# Patient Record
Sex: MALE | Race: WHITE | NOT HISPANIC OR LATINO | Employment: FULL TIME | URBAN - METROPOLITAN AREA
[De-identification: names, ages, dates, MRNs, and addresses within clinical notes are randomized per-mention and may not be internally consistent; named-entity substitution may affect disease eponyms.]

---

## 2018-11-09 ENCOUNTER — OFFICE VISIT (OUTPATIENT)
Dept: ORTHOPEDIC SURGERY | Facility: CLINIC | Age: 53
End: 2018-11-09

## 2018-11-09 VITALS — HEIGHT: 72 IN | BODY MASS INDEX: 39.82 KG/M2 | TEMPERATURE: 98.4 F | WEIGHT: 294 LBS

## 2018-11-09 DIAGNOSIS — S89.91XA INJURY OF RIGHT KNEE, INITIAL ENCOUNTER: Primary | ICD-10-CM

## 2018-11-09 PROCEDURE — 99203 OFFICE O/P NEW LOW 30 MIN: CPT | Performed by: ORTHOPAEDIC SURGERY

## 2018-11-09 PROCEDURE — 73562 X-RAY EXAM OF KNEE 3: CPT | Performed by: ORTHOPAEDIC SURGERY

## 2018-11-09 PROCEDURE — 20610 DRAIN/INJ JOINT/BURSA W/O US: CPT | Performed by: ORTHOPAEDIC SURGERY

## 2018-11-09 RX ORDER — IBUPROFEN 800 MG/1
800 TABLET ORAL EVERY 6 HOURS PRN
COMMUNITY
Start: 2018-10-25 | End: 2019-01-14

## 2018-11-09 RX ORDER — OMEPRAZOLE 40 MG/1
40 CAPSULE, DELAYED RELEASE ORAL 2 TIMES DAILY
COMMUNITY
Start: 2018-06-11 | End: 2023-01-12 | Stop reason: SDUPTHER

## 2018-11-09 RX ORDER — LORATADINE 10 MG/1
10 TABLET ORAL DAILY PRN
COMMUNITY

## 2018-11-09 RX ORDER — LISINOPRIL AND HYDROCHLOROTHIAZIDE 12.5; 1 MG/1; MG/1
1 TABLET ORAL EVERY MORNING
COMMUNITY
Start: 2018-06-11 | End: 2021-04-26

## 2018-11-09 RX ADMIN — LIDOCAINE HYDROCHLORIDE 2 ML: 20 INJECTION, SOLUTION EPIDURAL; INFILTRATION; INTRACAUDAL; PERINEURAL at 16:35

## 2018-11-09 RX ADMIN — METHYLPREDNISOLONE ACETATE 80 MG: 80 INJECTION, SUSPENSION INTRA-ARTICULAR; INTRALESIONAL; INTRAMUSCULAR; SOFT TISSUE at 16:35

## 2018-11-09 NOTE — PROGRESS NOTES
"Patient: Maurizio Mcnair    YOB: 1965    Medical Record Number: [id number]    Chief Complaints:  Right knee pain    History of Present Illness:     53 y.o. male patient who presents with a recent history of pain and dysfunction affecting the right knee.  Reports right knee injury occurred at work approximately 2 weeks ago.  He is a  for [employer].  At the time of the injury, he was using a \"palet juan c \" to unload a truck trailer.  Reports there was some wood debris in the trailer, which required him to reposition the palet juan c.  As he stepped to the right, he felt a \"pop\".  Initially the pain was moderate and he was unable to complete his shift due to increased pain. The next day he was evaluated at [facility].  He describes the pain as moderate, aching, and intermittent.  He has difficulty navigating stairs and climbing into a truck trailer.  He has tried ibuprofen 800 mg, ice, brace, and elevation all of which provided mild temporary relief.  He has associated swelling and popping.  He denies catching, locking, or instability.  He has a history of a symptomatic left knee meniscal tear, which was treated conservatively.        Allergies   Allergen Reactions   • Kiwi Extract Itching and Swelling   • Latex Itching     Itch       Medications:   Home Medications:    Current Outpatient Prescriptions:   •   MG tablet, , Disp: , Rfl:   •  lisinopril-hydrochlorothiazide (PRINZIDE,ZESTORETIC) 10-12.5 MG per tablet, Take 1 tablet by mouth., Disp: , Rfl:   •  omeprazole (priLOSEC) 40 MG capsule, Take 40 mg by mouth., Disp: , Rfl:   •  loratadine (CLARITIN) 10 MG tablet, Take 10 mg by mouth., Disp: , Rfl:     Past Medical History:   Diagnosis Date   • GERD (gastroesophageal reflux disease)    • Hypertension        Past Surgical History:   Procedure Laterality Date   • CATARACT EXTRACTION  04/2017   • HERNIA REPAIR  06/1988   • NASAL POLYP EXCISION  10/2016       Social History     Occupational " "History   • Not on file.     Social History Main Topics   • Smoking status: Never Smoker   • Smokeless tobacco: Never Used   • Alcohol use No   • Drug use: No   • Sexual activity: Not on file      Social History     Social History Narrative   • No narrative on file       Family History   Problem Relation Age of Onset   • Emphysema Father        Review of Systems:      Constitutional: Denies fever, shaking or chills   Eyes: Denies change in visual acuity   HEENT: Denies nasal congestion or sore throat   Respiratory: Denies cough or shortness of breath   Cardiovascular: Denies chest pain or edema  Endocrine: Denies tremors, palpitations, intolerance of heat or cold, polyuria, polydipsia.  GI: Denies abdominal pain, nausea, vomiting, bloody stools or diarrhea  : Denies frequency, urgency, incontinence, retention, or nocturia.  Musculoskeletal: Denies numbness tingling or loss of motor function except as above  Integument: Denies rash, lesion or ulceration   Neurologic: Denies headache or focal weakness, deficits  Heme: Denies epistaxis, spontaneous or excessive bleeding, epistaxis, hematuria, melena, fatigue, enlarged or tender lymph nodes.      All other pertinent positives and negatives as noted above in HPI.    Physical Exam: 53 y.o. male  Vitals:    11/09/18 1516   Temp: 98.4 °F (36.9 °C)   TempSrc: Temporal Artery    Weight: 133 kg (294 lb)   Height: 182.9 cm (72\")       General:  Patient is awake and alert.  Appears in no acute distress or discomfort.    Psych:  Affect and demeanor are appropriate.    Eyes:  Conjunctiva and sclera appear grossly normal.  Eyes track well and EOM seem to be intact.    Ears:  No gross abnormalities.  Hearing adequate for the exam.    Cardiovascular:  Regular rate and rhythm.    Lungs:  Good chest expansion.  Breathing unlabored.    Spine:  Back appears grossly normal.  No palpable masses or adenopathy.  Good motion.  Straight leg raise and crossed straight leg raise manuever are " both negative for lower leg and/or knee pain.    Right Lower Extremity:  Skin is benign with the exception of a subtle rash which he says is from the neoprene sleeve that he has been wearing for comfort.  No obvious gross abnormalities about the knee.  No palpable masses or adenopathy.  Moderate to severe tenderness noted over medial joint line.  Motion is to 120° of flexion, full extension.  Markedly positive medial Julianna's for pain.  No instability.  Strength is well preserved including hip flexion, knee extension, ankle and toe plantarflexion, ankle inversion and eversion.  Good sensory function throughout the leg and foot.  Palpable pulses.  Gait is mildly antalgic.         Radiology: Bilateral standing AP views, bilateral merchants views, and a lateral view of the right knee are ordered by myself and reviewed to evaluate the patient's complaint.  No comparison films are immediately available.  The x-rays show no obvious acute abnormalities, lesions, masses, significant degenerative changes, or other concerning findings.    Assessment/Plan:  Right knee pain, possible meniscal tear    We discussed treatment options in detail including the risks, benefits, and alternatives of conservative treatment versus further workup and potential surgical options.  Regarding conservative treatment, we discussed appropriate activity modifications, anti-inflammatories, injections, and physical therapy.  The patient has stated that he would like to start with a conservative approach.  I have recommended a corticosteroid injection. The risks, benefits, and alternatives to an injection were thoroughly discussed and the patient consented to proceed.  I am going to refer him to physical therapy.  I will see him back in 2 weeks to reevaluate.  I will keep him on work restrictions for now.    Rodney Vega MD    11/09/2018    Large Joint Arthrocentesis  Date/Time: 11/9/2018 4:35 PM  Consent given by: patient  Site marked: site  marked  Timeout: Immediately prior to procedure a time out was called to verify the correct patient, procedure, equipment, support staff and site/side marked as required   Procedure Details  Location: knee - R knee  Preparation: Patient was prepped and draped in the usual sterile fashion  Needle gauge: 21 G.  Approach: anterolateral  Medications administered: 2 mL lidocaine PF 2% 2 %; 80 mg methylPREDNISolone acetate 80 MG/ML  Patient tolerance: patient tolerated the procedure well with no immediate complications        CC to Stevie Herndon MD

## 2018-11-11 RX ORDER — METHYLPREDNISOLONE ACETATE 80 MG/ML
80 INJECTION, SUSPENSION INTRA-ARTICULAR; INTRALESIONAL; INTRAMUSCULAR; SOFT TISSUE
Status: COMPLETED | OUTPATIENT
Start: 2018-11-09 | End: 2018-11-09

## 2018-11-11 RX ORDER — LIDOCAINE HYDROCHLORIDE 20 MG/ML
2 INJECTION, SOLUTION EPIDURAL; INFILTRATION; INTRACAUDAL; PERINEURAL
Status: COMPLETED | OUTPATIENT
Start: 2018-11-09 | End: 2018-11-09

## 2018-11-26 ENCOUNTER — OFFICE VISIT (OUTPATIENT)
Dept: ORTHOPEDIC SURGERY | Facility: CLINIC | Age: 53
End: 2018-11-26

## 2018-11-26 VITALS — HEIGHT: 72 IN | WEIGHT: 294 LBS | BODY MASS INDEX: 39.82 KG/M2

## 2018-11-26 DIAGNOSIS — M25.561 RIGHT KNEE PAIN, UNSPECIFIED CHRONICITY: Primary | ICD-10-CM

## 2018-11-26 PROCEDURE — 99213 OFFICE O/P EST LOW 20 MIN: CPT | Performed by: ORTHOPAEDIC SURGERY

## 2018-11-26 NOTE — PROGRESS NOTES
Patient:Maurizio Mcnair    YOB: 1965    Medical Record Number:[id number]    Chief Complaints:  Right knee pain    History of Present Illness:     53 y.o. male patient who presents for follow-up of his right knee.  The last injection helped for 2 days by his estimate.  He continues to have moderate intermittent sharp pains.  The symptoms are worse with bending and squatting.  He tried some limited physical therapy which did not help.  Pain is anteromedial and posterior.  Denies any catching or locking.  He does get some clicking and popping.    Allergies:  Allergies   Allergen Reactions   • Kiwi Extract Itching and Swelling   • Latex Itching     Itch       Home Medications:    Current Outpatient Medications:   •   MG tablet, , Disp: , Rfl:   •  lisinopril-hydrochlorothiazide (PRINZIDE,ZESTORETIC) 10-12.5 MG per tablet, Take 1 tablet by mouth., Disp: , Rfl:   •  loratadine (CLARITIN) 10 MG tablet, Take 10 mg by mouth., Disp: , Rfl:   •  omeprazole (priLOSEC) 40 MG capsule, Take 40 mg by mouth., Disp: , Rfl:     Past Medical History:   Diagnosis Date   • GERD (gastroesophageal reflux disease)    • Hypertension        Past Surgical History:   Procedure Laterality Date   • CATARACT EXTRACTION  04/2017   • HERNIA REPAIR  06/1988   • NASAL POLYP EXCISION  10/2016       Social History     Occupational History   • Not on file   Tobacco Use   • Smoking status: Never Smoker   • Smokeless tobacco: Never Used   Substance and Sexual Activity   • Alcohol use: No   • Drug use: No   • Sexual activity: Not on file      Social History     Social History Narrative   • Not on file       Family History   Problem Relation Age of Onset   • Emphysema Father        Review of Systems:      Constitutional: Denies fever, shaking or chills   Eyes: Denies change in visual acuity   HEENT: Denies nasal congestion or sore throat   Respiratory: Denies cough or shortness of breath   Cardiovascular: Denies chest pain or  "edema  Endocrine: Denies tremors, palpitations, intolerance of heat or cold, polyuria, polydipsia.  GI: Denies abdominal pain, nausea, vomiting, bloody stools or diarrhea  : Denies frequency, urgency, incontinence, retention, or nocturia.  Musculoskeletal: Denies numbness, tingling or loss of motor function except as above  Integument: Denies rash, lesion or ulceration   Neurologic: Denies headache or focal weakness, deficits  Heme: Denies spontaneous or excessive bleeding, epistaxis, hematuria, melena, fatigue, enlarged or tender lymph nodes.      All other pertinent positives and negatives as noted above in HPI.    Physical Exam:53 y.o. male  Vitals:    11/26/18 0758   Weight: 133 kg (294 lb)   Height: 182.9 cm (72\")       General:  Patient is awake and alert.  Appears in no acute distress or discomfort.    Psych:  Affect and demeanor are appropriate.    Extremities:  The right knee is examined.  Skin is benign.  Trace effusion.  Moderate medial joint line tenderness.  Hyperflexion is uncomfortable.  The knee is stable.  Medial and lateral Julianna's are both negative.  Good motor and sensory function throughout the leg.  Brisk capillary refill.    Imaging:  None taken    Assessment/Plan:  Right knee pain possible meniscal tear    At this point, I'm going to refer him for an MRI.  I told him I will call him with the results.  We will come up with a plan pending review of that study.    Rodney Vega MD    11/26/2018    "

## 2018-11-27 ENCOUNTER — TELEPHONE (OUTPATIENT)
Dept: ORTHOPEDIC SURGERY | Facility: CLINIC | Age: 53
End: 2018-11-27

## 2018-11-28 ENCOUNTER — TELEPHONE (OUTPATIENT)
Dept: ORTHOPEDIC SURGERY | Facility: CLINIC | Age: 53
End: 2018-11-28

## 2018-12-10 ENCOUNTER — TELEPHONE (OUTPATIENT)
Dept: ORTHOPEDIC SURGERY | Facility: CLINIC | Age: 53
End: 2018-12-10

## 2018-12-11 DIAGNOSIS — M25.561 RIGHT KNEE PAIN, UNSPECIFIED CHRONICITY: ICD-10-CM

## 2018-12-14 ENCOUNTER — OFFICE VISIT (OUTPATIENT)
Dept: ORTHOPEDIC SURGERY | Facility: CLINIC | Age: 53
End: 2018-12-14

## 2018-12-14 VITALS — WEIGHT: 294 LBS | TEMPERATURE: 98.1 F | HEIGHT: 72 IN | BODY MASS INDEX: 39.82 KG/M2

## 2018-12-14 DIAGNOSIS — S83.209D: Primary | ICD-10-CM

## 2018-12-14 PROCEDURE — 99214 OFFICE O/P EST MOD 30 MIN: CPT | Performed by: ORTHOPAEDIC SURGERY

## 2018-12-16 RX ORDER — CEFAZOLIN SODIUM 2 G/100ML
2 INJECTION, SOLUTION INTRAVENOUS ONCE
Status: CANCELLED | OUTPATIENT
Start: 2019-01-15 | End: 2018-12-16

## 2018-12-16 NOTE — PROGRESS NOTES
Patient: Maurizio Mcnair    YOB: 1965    Medical Record Number: [id number]    Chief Complaints:  Right knee pain    History of Present Illness:     53 y.o. male patient who presents for follow-up of his right knee.  He has moderate intermittent sharp pain along with clicking and popping.  He did get his MRI.  He presents to review that study.  He reports no significant improvement in his symptoms since I last saw him.  He had one injection which helped transiently.  He tells me he has been wearing a brace and that seems to help somewhat.    Allergies:   Allergies   Allergen Reactions   • Kiwi Extract Itching and Swelling   • Latex Itching     Itch       Home Medications:    Current Outpatient Medications:   •   MG tablet, , Disp: , Rfl:   •  lisinopril-hydrochlorothiazide (PRINZIDE,ZESTORETIC) 10-12.5 MG per tablet, Take 1 tablet by mouth., Disp: , Rfl:   •  loratadine (CLARITIN) 10 MG tablet, Take 10 mg by mouth., Disp: , Rfl:   •  omeprazole (priLOSEC) 40 MG capsule, Take 40 mg by mouth., Disp: , Rfl:     Past Medical History:   Diagnosis Date   • GERD (gastroesophageal reflux disease)    • Hypertension        Past Surgical History:   Procedure Laterality Date   • CATARACT EXTRACTION  04/2017   • HERNIA REPAIR  06/1988   • NASAL POLYP EXCISION  10/2016       Social History     Occupational History   • Not on file   Tobacco Use   • Smoking status: Never Smoker   • Smokeless tobacco: Never Used   Substance and Sexual Activity   • Alcohol use: No   • Drug use: No   • Sexual activity: Not on file      Social History     Social History Narrative   • Not on file       Family History   Problem Relation Age of Onset   • Emphysema Father        Review of Systems:      Constitutional: Denies fever, shaking or chills   Eyes: Denies change in visual acuity   HEENT: Denies nasal congestion or sore throat   Respiratory: Denies cough or shortness of breath   Cardiovascular: Denies chest pain or  "edema  Endocrine: Denies tremors, palpitations, intolerance of heat or cold, polyuria, polydipsia.  GI: Denies abdominal pain, nausea, vomiting, bloody stools or diarrhea  : Denies frequency, urgency, incontinence, retention, or nocturia.  Musculoskeletal: Denies numbness, tingling or loss of motor function except as above  Integument: Denies rash, lesion or ulceration   Neurologic: Denies headache or focal weakness, deficits  Heme: Denies spontaneous or excessive bleeding, epistaxis, hematuria, melena, fatigue, enlarged or tender lymph nodes.      All other pertinent positives and negatives as noted above in HPI.    Physical Exam: 53 y.o. male  Vitals:    12/14/18 0853   Temp: 98.1 °F (36.7 °C)   Weight: 133 kg (294 lb)   Height: 182.9 cm (72\")       General:  Patient is awake and alert.  Appears in no acute distress or discomfort.    Psych:  Affect and demeanor are appropriate.    Eyes:  Conjunctiva and sclera appear grossly normal.  Eyes track well and EOM seem to be intact.    Ears:  No gross abnormalities.  Hearing adequate for the exam.    Cardiovascular:  Regular rate and rhythm.    Lungs:  Good chest expansion.  Breathing unlabored.    Extremities: Right knee is examined.  Skin is benign.  Moderate medial tenderness.  Full motion.  No instability.  Neither medial nor lateral Julianna's really seems to reproduce much discomfort for him.  His good strength throughout the leg.  Normal sensation.  Brisk capillary refill.    Imaging:  MRI of the right knee is reviewed along with the associated report.  There is a complex medial meniscal tear involving the posterior horn and mid body.  There is partial meniscal extrusion and a small parameniscal cyst.  No other significant findings noted.    Assessment/Plan:  Right knee medial meniscal tear    We will plan on proceeding with a right knee arthroscopy and partial meniscectomy including possible chondroplasty and addressing any unforseen pathology as indicated.  I " reviewed details of procedure with patient today and discussed all the risks, benefits, alternatives, and limitations of the procedure in laymen's terms with the risks including but not limited to:  neurovascular damage, bleeding, infection, hematoma, chronic pain, worsening of pain, chondrolysis, swelling, loss of motion, weakness, stiffness, instability, DVT, pulmonary embolus, death, stroke, complex regional pain syndrome, and need for additional procedures.  The patient verbalized understanding, and was given the opportunity to ask and have all questions answered today.  No guarantees were given regarding results of surgery.        Rodney Vega MD    12/14/2018

## 2019-01-08 PROBLEM — S83.209D: Status: ACTIVE | Noted: 2019-01-08

## 2019-01-14 ENCOUNTER — APPOINTMENT (OUTPATIENT)
Dept: PREADMISSION TESTING | Facility: HOSPITAL | Age: 54
End: 2019-01-14

## 2019-01-14 VITALS
BODY MASS INDEX: 42.12 KG/M2 | HEIGHT: 72 IN | TEMPERATURE: 97.8 F | RESPIRATION RATE: 20 BRPM | OXYGEN SATURATION: 95 % | HEART RATE: 74 BPM | SYSTOLIC BLOOD PRESSURE: 115 MMHG | WEIGHT: 311 LBS | DIASTOLIC BLOOD PRESSURE: 84 MMHG

## 2019-01-14 DIAGNOSIS — S83.209D: ICD-10-CM

## 2019-01-14 LAB
ANION GAP SERPL CALCULATED.3IONS-SCNC: 10.6 MMOL/L
BILIRUB UR QL STRIP: NEGATIVE
BUN BLD-MCNC: 21 MG/DL (ref 6–20)
BUN/CREAT SERPL: 17.9 (ref 7–25)
CALCIUM SPEC-SCNC: 9.8 MG/DL (ref 8.6–10.5)
CHLORIDE SERPL-SCNC: 102 MMOL/L (ref 98–107)
CLARITY UR: CLEAR
CO2 SERPL-SCNC: 26.4 MMOL/L (ref 22–29)
COLOR UR: YELLOW
CREAT BLD-MCNC: 1.17 MG/DL (ref 0.76–1.27)
DEPRECATED RDW RBC AUTO: 44.7 FL (ref 37–54)
ERYTHROCYTE [DISTWIDTH] IN BLOOD BY AUTOMATED COUNT: 13.5 % (ref 11.5–14.5)
GFR SERPL CREATININE-BSD FRML MDRD: 66 ML/MIN/1.73
GLUCOSE BLD-MCNC: 100 MG/DL (ref 65–99)
GLUCOSE UR STRIP-MCNC: NEGATIVE MG/DL
HCT VFR BLD AUTO: 47.7 % (ref 40.4–52.2)
HGB BLD-MCNC: 15.4 G/DL (ref 13.7–17.6)
HGB UR QL STRIP.AUTO: NEGATIVE
KETONES UR QL STRIP: NEGATIVE
LEUKOCYTE ESTERASE UR QL STRIP.AUTO: NEGATIVE
MCH RBC QN AUTO: 29.3 PG (ref 27–32.7)
MCHC RBC AUTO-ENTMCNC: 32.3 G/DL (ref 32.6–36.4)
MCV RBC AUTO: 90.7 FL (ref 79.8–96.2)
NITRITE UR QL STRIP: NEGATIVE
PH UR STRIP.AUTO: 5.5 [PH] (ref 5–8)
PLATELET # BLD AUTO: 226 10*3/MM3 (ref 140–500)
PMV BLD AUTO: 9.7 FL (ref 6–12)
POTASSIUM BLD-SCNC: 4.5 MMOL/L (ref 3.5–5.2)
PROT UR QL STRIP: NEGATIVE
RBC # BLD AUTO: 5.26 10*6/MM3 (ref 4.6–6)
SODIUM BLD-SCNC: 139 MMOL/L (ref 136–145)
SP GR UR STRIP: 1.02 (ref 1–1.03)
UROBILINOGEN UR QL STRIP: NORMAL
WBC NRBC COR # BLD: 9.26 10*3/MM3 (ref 4.5–10.7)

## 2019-01-14 PROCEDURE — 85027 COMPLETE CBC AUTOMATED: CPT | Performed by: ORTHOPAEDIC SURGERY

## 2019-01-14 PROCEDURE — 93010 ELECTROCARDIOGRAM REPORT: CPT | Performed by: INTERNAL MEDICINE

## 2019-01-14 PROCEDURE — 80048 BASIC METABOLIC PNL TOTAL CA: CPT | Performed by: ORTHOPAEDIC SURGERY

## 2019-01-14 PROCEDURE — 93005 ELECTROCARDIOGRAM TRACING: CPT

## 2019-01-14 PROCEDURE — 36415 COLL VENOUS BLD VENIPUNCTURE: CPT

## 2019-01-14 PROCEDURE — 81003 URINALYSIS AUTO W/O SCOPE: CPT | Performed by: ORTHOPAEDIC SURGERY

## 2019-01-14 RX ORDER — ACETAMINOPHEN 500 MG
500 TABLET ORAL EVERY 6 HOURS PRN
COMMUNITY
End: 2019-01-15 | Stop reason: HOSPADM

## 2019-01-14 RX ORDER — AMOXICILLIN AND CLAVULANATE POTASSIUM 875; 125 MG/1; MG/1
1 TABLET, FILM COATED ORAL 2 TIMES DAILY
COMMUNITY
Start: 2019-01-04 | End: 2019-01-15 | Stop reason: HOSPADM

## 2019-01-14 NOTE — DISCHARGE INSTRUCTIONS
Take the following medications the morning of surgery with a small sip of water:    TAKE OMEPRAZOLE,    BRING IN LISINOPRIL BUT DO NOT TAKE    General Instructions:  • Do not eat solid food after midnight the night before surgery.  • You may drink clear liquids day of surgery but must stop at least one hour before your hospital arrival time.  • It is beneficial for you to have a clear drink that contains carbohydrates the day of surgery.  We suggest a 12 to 20 ounce bottle of Gatorade or Powerade for non-diabetic patients     Clear liquids are liquids you can see through.  Nothing red in color.     Plain water                               Sports drinks  Sodas                                   Gelatin (Jell-O)  Fruit juices without pulp such as white grape juice and apple juice  Popsicles that contain no fruit or yogurt  Tea or coffee (no cream or milk added)  Gatorade / Powerade  G2 / Powerade Zero    • If applicable bring your C-PAP/ BI-PAP machine.  • Bring any papers given to you in the doctor’s office.  • Wear clean comfortable clothes and socks.  • Do not wear contact lenses or make-up.  Bring a case for your glasses.   • Bring crutches or walker if applicable.  • Remove all piercings.  Leave jewelry and any other valuables at home.  • The Pre-Admission Testing nurse will instruct you to bring medications if unable to obtain an accurate list in Pre-Admission Testing.  • REPORT TO OUTPATIENT SURGERY ON 1-  1130 AM PER MD            Preventing a Surgical Site Infection:  • For 2 to 3 days before surgery, avoid shaving with a razor because the razor can irritate skin and make it easier to develop an infection.    • Any areas of open skin can increase the risk of a post-operative wound infection by allowing bacteria to enter and travel throughout the body.  Notify your surgeon if you have any skin wounds / rashes even if it is not near the expected surgical site.  The area will need assessed to determine if  surgery should be delayed until it is healed.  • The night prior to surgery sleep in a clean bed with clean clothing.  Do not allow pets to sleep with you.  • Shower on the morning of surgery using a fresh bar of anti-bacterial soap (such as Dial) and clean washcloth.  Dry with a clean towel and dress in clean clothing.  • Ask your surgeon if you will be receiving antibiotics prior to surgery.  • Make sure you, your family, and all healthcare providers clean their hands with soap and water or an alcohol based hand  before caring for you or your wound.    Day of surgery:  Upon arrival, a Pre-op nurse and Anesthesiologist will review your health history, obtain vital signs, and answer questions you may have.  The only belongings needed at this time will be your home medications and if applicable your C-PAP/BI-PAP machine.  If you are staying overnight your family can leave the rest of your belongings in the car and bring them to your room later.  A Pre-op nurse will start an IV and you may receive medication in preparation for surgery, including something to help you relax.  Your family will be able to see you in the Pre-op area.  While you are in surgery your family should notify the waiting room  if they leave the waiting room area and provide a contact phone number.    Please be aware that surgery does come with discomfort.  We want to make every effort to control your discomfort so please discuss any uncontrolled symptoms with your nurse.   Your doctor will most likely have prescribed pain medications.      If you are going home after surgery you will receive individualized written care instructions before being discharged.  A responsible adult must drive you to and from the hospital on the day of your surgery and stay with you for 24 hours.        You have received a list of surgical assistants for your reference.  If you have any questions please call Pre-Admission Testing at  [phone].  Deductibles and co-payments are collected on the day of service. Please be prepared to pay the required co-pay, deductible or deposit on the day of service as defined by your plan.

## 2019-01-15 ENCOUNTER — HOSPITAL ENCOUNTER (OUTPATIENT)
Facility: HOSPITAL | Age: 54
Setting detail: HOSPITAL OUTPATIENT SURGERY
Discharge: HOME | End: 2019-01-15
Attending: ORTHOPAEDIC SURGERY | Admitting: ORTHOPAEDIC SURGERY

## 2019-01-15 ENCOUNTER — ANESTHESIA EVENT (OUTPATIENT)
Dept: PERIOP | Facility: HOSPITAL | Age: 54
End: 2019-01-15

## 2019-01-15 ENCOUNTER — ANESTHESIA (OUTPATIENT)
Dept: PERIOP | Facility: HOSPITAL | Age: 54
End: 2019-01-15

## 2019-01-15 VITALS
OXYGEN SATURATION: 98 % | RESPIRATION RATE: 18 BRPM | WEIGHT: 315 LBS | TEMPERATURE: 97.4 F | HEART RATE: 66 BPM | BODY MASS INDEX: 44.25 KG/M2 | DIASTOLIC BLOOD PRESSURE: 75 MMHG | SYSTOLIC BLOOD PRESSURE: 130 MMHG

## 2019-01-15 DIAGNOSIS — S83.209D: ICD-10-CM

## 2019-01-15 PROCEDURE — 25010000002 PROMETHAZINE PER 50 MG: Performed by: NURSE ANESTHETIST, CERTIFIED REGISTERED

## 2019-01-15 PROCEDURE — 25010000002 MIDAZOLAM PER 1 MG: Performed by: ANESTHESIOLOGY

## 2019-01-15 PROCEDURE — 25010000003 CEFAZOLIN IN DEXTROSE 2-4 GM/100ML-% SOLUTION: Performed by: ORTHOPAEDIC SURGERY

## 2019-01-15 PROCEDURE — 25010000002 FENTANYL CITRATE (PF) 100 MCG/2ML SOLUTION: Performed by: ANESTHESIOLOGY

## 2019-01-15 PROCEDURE — 25010000002 DEXAMETHASONE PER 1 MG: Performed by: NURSE ANESTHETIST, CERTIFIED REGISTERED

## 2019-01-15 PROCEDURE — 25010000002 FENTANYL CITRATE (PF) 100 MCG/2ML SOLUTION: Performed by: NURSE ANESTHETIST, CERTIFIED REGISTERED

## 2019-01-15 PROCEDURE — 25010000002 HYDROMORPHONE PER 4 MG: Performed by: NURSE ANESTHETIST, CERTIFIED REGISTERED

## 2019-01-15 PROCEDURE — 25010000002 EPINEPHRINE PER 0.1 MG: Performed by: ORTHOPAEDIC SURGERY

## 2019-01-15 PROCEDURE — 29880 ARTHRS KNE SRG MNISECTMY M&L: CPT | Performed by: ORTHOPAEDIC SURGERY

## 2019-01-15 PROCEDURE — 25010000002 SUCCINYLCHOLINE PER 20 MG: Performed by: NURSE ANESTHETIST, CERTIFIED REGISTERED

## 2019-01-15 PROCEDURE — 25010000002 PROPOFOL 10 MG/ML EMULSION: Performed by: NURSE ANESTHETIST, CERTIFIED REGISTERED

## 2019-01-15 RX ORDER — FLUMAZENIL 0.1 MG/ML
0.2 INJECTION INTRAVENOUS AS NEEDED
Status: DISCONTINUED | OUTPATIENT
Start: 2019-01-15 | End: 2019-01-15 | Stop reason: HOSPADM

## 2019-01-15 RX ORDER — PROMETHAZINE HYDROCHLORIDE 25 MG/1
25 SUPPOSITORY RECTAL ONCE AS NEEDED
Status: COMPLETED | OUTPATIENT
Start: 2019-01-15 | End: 2019-01-15

## 2019-01-15 RX ORDER — ONDANSETRON 4 MG/1
4 TABLET, FILM COATED ORAL EVERY 8 HOURS PRN
Qty: 30 TABLET | Refills: 0 | Status: SHIPPED | OUTPATIENT
Start: 2019-01-15 | End: 2019-11-22

## 2019-01-15 RX ORDER — FENTANYL CITRATE 50 UG/ML
50 INJECTION, SOLUTION INTRAMUSCULAR; INTRAVENOUS
Status: DISCONTINUED | OUTPATIENT
Start: 2019-01-15 | End: 2019-01-15 | Stop reason: HOSPADM

## 2019-01-15 RX ORDER — LIDOCAINE HYDROCHLORIDE 10 MG/ML
0.5 INJECTION, SOLUTION EPIDURAL; INFILTRATION; INTRACAUDAL; PERINEURAL ONCE AS NEEDED
Status: DISCONTINUED | OUTPATIENT
Start: 2019-01-15 | End: 2019-01-15 | Stop reason: HOSPADM

## 2019-01-15 RX ORDER — DIPHENHYDRAMINE HCL 25 MG
25 CAPSULE ORAL
Status: DISCONTINUED | OUTPATIENT
Start: 2019-01-15 | End: 2019-01-15 | Stop reason: HOSPADM

## 2019-01-15 RX ORDER — MIDAZOLAM HYDROCHLORIDE 1 MG/ML
1 INJECTION INTRAMUSCULAR; INTRAVENOUS
Status: DISCONTINUED | OUTPATIENT
Start: 2019-01-15 | End: 2019-01-15 | Stop reason: HOSPADM

## 2019-01-15 RX ORDER — BUPIVACAINE HYDROCHLORIDE AND EPINEPHRINE 5; 5 MG/ML; UG/ML
INJECTION, SOLUTION PERINEURAL AS NEEDED
Status: DISCONTINUED | OUTPATIENT
Start: 2019-01-15 | End: 2019-01-15 | Stop reason: HOSPADM

## 2019-01-15 RX ORDER — MIDAZOLAM HYDROCHLORIDE 1 MG/ML
2 INJECTION INTRAMUSCULAR; INTRAVENOUS
Status: DISCONTINUED | OUTPATIENT
Start: 2019-01-15 | End: 2019-01-15 | Stop reason: HOSPADM

## 2019-01-15 RX ORDER — ROCURONIUM BROMIDE 10 MG/ML
INJECTION, SOLUTION INTRAVENOUS AS NEEDED
Status: DISCONTINUED | OUTPATIENT
Start: 2019-01-15 | End: 2019-01-15 | Stop reason: SURG

## 2019-01-15 RX ORDER — PROMETHAZINE HYDROCHLORIDE 25 MG/ML
12.5 INJECTION, SOLUTION INTRAMUSCULAR; INTRAVENOUS ONCE AS NEEDED
Status: COMPLETED | OUTPATIENT
Start: 2019-01-15 | End: 2019-01-15

## 2019-01-15 RX ORDER — EPHEDRINE SULFATE 50 MG/ML
5 INJECTION, SOLUTION INTRAVENOUS ONCE AS NEEDED
Status: DISCONTINUED | OUTPATIENT
Start: 2019-01-15 | End: 2019-01-15 | Stop reason: HOSPADM

## 2019-01-15 RX ORDER — PROPOFOL 10 MG/ML
VIAL (ML) INTRAVENOUS AS NEEDED
Status: DISCONTINUED | OUTPATIENT
Start: 2019-01-15 | End: 2019-01-15 | Stop reason: SURG

## 2019-01-15 RX ORDER — ACETAMINOPHEN 650 MG/1
650 SUPPOSITORY RECTAL ONCE AS NEEDED
Status: DISCONTINUED | OUTPATIENT
Start: 2019-01-15 | End: 2019-01-15 | Stop reason: HOSPADM

## 2019-01-15 RX ORDER — DOCUSATE SODIUM 100 MG/1
100 CAPSULE, LIQUID FILLED ORAL 2 TIMES DAILY
Qty: 60 CAPSULE | Refills: 0 | Status: SHIPPED | OUTPATIENT
Start: 2019-01-15 | End: 2019-11-22

## 2019-01-15 RX ORDER — LIDOCAINE HYDROCHLORIDE 20 MG/ML
INJECTION, SOLUTION INFILTRATION; PERINEURAL AS NEEDED
Status: DISCONTINUED | OUTPATIENT
Start: 2019-01-15 | End: 2019-01-15 | Stop reason: SURG

## 2019-01-15 RX ORDER — DEXAMETHASONE SODIUM PHOSPHATE 10 MG/ML
INJECTION INTRAMUSCULAR; INTRAVENOUS AS NEEDED
Status: DISCONTINUED | OUTPATIENT
Start: 2019-01-15 | End: 2019-01-15 | Stop reason: SURG

## 2019-01-15 RX ORDER — ONDANSETRON 2 MG/ML
4 INJECTION INTRAMUSCULAR; INTRAVENOUS ONCE AS NEEDED
Status: DISCONTINUED | OUTPATIENT
Start: 2019-01-15 | End: 2019-01-15 | Stop reason: HOSPADM

## 2019-01-15 RX ORDER — PROMETHAZINE HYDROCHLORIDE 25 MG/1
25 TABLET ORAL ONCE AS NEEDED
Status: COMPLETED | OUTPATIENT
Start: 2019-01-15 | End: 2019-01-15

## 2019-01-15 RX ORDER — NALOXONE HCL 0.4 MG/ML
0.2 VIAL (ML) INJECTION AS NEEDED
Status: DISCONTINUED | OUTPATIENT
Start: 2019-01-15 | End: 2019-01-15 | Stop reason: HOSPADM

## 2019-01-15 RX ORDER — FAMOTIDINE 10 MG/ML
20 INJECTION, SOLUTION INTRAVENOUS ONCE
Status: COMPLETED | OUTPATIENT
Start: 2019-01-15 | End: 2019-01-15

## 2019-01-15 RX ORDER — HYDROMORPHONE HYDROCHLORIDE 1 MG/ML
0.5 INJECTION, SOLUTION INTRAMUSCULAR; INTRAVENOUS; SUBCUTANEOUS
Status: DISCONTINUED | OUTPATIENT
Start: 2019-01-15 | End: 2019-01-15 | Stop reason: HOSPADM

## 2019-01-15 RX ORDER — ACETAMINOPHEN 325 MG/1
650 TABLET ORAL ONCE AS NEEDED
Status: DISCONTINUED | OUTPATIENT
Start: 2019-01-15 | End: 2019-01-15 | Stop reason: HOSPADM

## 2019-01-15 RX ORDER — LABETALOL HYDROCHLORIDE 5 MG/ML
5 INJECTION, SOLUTION INTRAVENOUS
Status: DISCONTINUED | OUTPATIENT
Start: 2019-01-15 | End: 2019-01-15 | Stop reason: HOSPADM

## 2019-01-15 RX ORDER — DIPHENHYDRAMINE HYDROCHLORIDE 50 MG/ML
12.5 INJECTION INTRAMUSCULAR; INTRAVENOUS
Status: DISCONTINUED | OUTPATIENT
Start: 2019-01-15 | End: 2019-01-15 | Stop reason: HOSPADM

## 2019-01-15 RX ORDER — OXYCODONE AND ACETAMINOPHEN 7.5; 325 MG/1; MG/1
1 TABLET ORAL EVERY 4 HOURS PRN
Qty: 42 TABLET | Refills: 0 | Status: SHIPPED | OUTPATIENT
Start: 2019-01-15 | End: 2019-01-23

## 2019-01-15 RX ORDER — SODIUM CHLORIDE 0.9 % (FLUSH) 0.9 %
1-10 SYRINGE (ML) INJECTION AS NEEDED
Status: DISCONTINUED | OUTPATIENT
Start: 2019-01-15 | End: 2019-01-15 | Stop reason: HOSPADM

## 2019-01-15 RX ORDER — OXYCODONE AND ACETAMINOPHEN 7.5; 325 MG/1; MG/1
1 TABLET ORAL ONCE AS NEEDED
Status: DISCONTINUED | OUTPATIENT
Start: 2019-01-15 | End: 2019-01-15 | Stop reason: HOSPADM

## 2019-01-15 RX ORDER — SODIUM CHLORIDE, SODIUM LACTATE, POTASSIUM CHLORIDE, CALCIUM CHLORIDE 600; 310; 30; 20 MG/100ML; MG/100ML; MG/100ML; MG/100ML
9 INJECTION, SOLUTION INTRAVENOUS CONTINUOUS
Status: DISCONTINUED | OUTPATIENT
Start: 2019-01-15 | End: 2019-01-15 | Stop reason: HOSPADM

## 2019-01-15 RX ORDER — SUCCINYLCHOLINE CHLORIDE 20 MG/ML
INJECTION INTRAMUSCULAR; INTRAVENOUS AS NEEDED
Status: DISCONTINUED | OUTPATIENT
Start: 2019-01-15 | End: 2019-01-15 | Stop reason: SURG

## 2019-01-15 RX ORDER — HYDRALAZINE HYDROCHLORIDE 20 MG/ML
5 INJECTION INTRAMUSCULAR; INTRAVENOUS
Status: DISCONTINUED | OUTPATIENT
Start: 2019-01-15 | End: 2019-01-15 | Stop reason: HOSPADM

## 2019-01-15 RX ORDER — CEFAZOLIN SODIUM 2 G/100ML
2 INJECTION, SOLUTION INTRAVENOUS ONCE
Status: COMPLETED | OUTPATIENT
Start: 2019-01-15 | End: 2019-01-15

## 2019-01-15 RX ORDER — HYDROCODONE BITARTRATE AND ACETAMINOPHEN 7.5; 325 MG/1; MG/1
1 TABLET ORAL ONCE AS NEEDED
Status: COMPLETED | OUTPATIENT
Start: 2019-01-15 | End: 2019-01-15

## 2019-01-15 RX ADMIN — HYDROMORPHONE HYDROCHLORIDE 0.5 MG: 1 INJECTION, SOLUTION INTRAMUSCULAR; INTRAVENOUS; SUBCUTANEOUS at 14:37

## 2019-01-15 RX ADMIN — SODIUM CHLORIDE, POTASSIUM CHLORIDE, SODIUM LACTATE AND CALCIUM CHLORIDE: 600; 310; 30; 20 INJECTION, SOLUTION INTRAVENOUS at 13:55

## 2019-01-15 RX ADMIN — LIDOCAINE HYDROCHLORIDE 100 MG: 20 INJECTION, SOLUTION INFILTRATION; PERINEURAL at 13:03

## 2019-01-15 RX ADMIN — MIDAZOLAM HYDROCHLORIDE 1 MG: 2 INJECTION, SOLUTION INTRAMUSCULAR; INTRAVENOUS at 12:46

## 2019-01-15 RX ADMIN — CEFAZOLIN SODIUM 2 G: 2 INJECTION, SOLUTION INTRAVENOUS at 13:09

## 2019-01-15 RX ADMIN — HYDROCODONE BITARTRATE AND ACETAMINOPHEN 1 TABLET: 7.5; 325 TABLET ORAL at 14:12

## 2019-01-15 RX ADMIN — SUCCINYLCHOLINE CHLORIDE 200 MG: 20 INJECTION, SOLUTION INTRAMUSCULAR; INTRAVENOUS at 13:03

## 2019-01-15 RX ADMIN — FENTANYL CITRATE 100 MCG: 50 INJECTION INTRAMUSCULAR; INTRAVENOUS at 13:03

## 2019-01-15 RX ADMIN — MIDAZOLAM HYDROCHLORIDE 1 MG: 2 INJECTION, SOLUTION INTRAMUSCULAR; INTRAVENOUS at 12:57

## 2019-01-15 RX ADMIN — DEXAMETHASONE SODIUM PHOSPHATE 8 MG: 10 INJECTION INTRAMUSCULAR; INTRAVENOUS at 13:13

## 2019-01-15 RX ADMIN — PROPOFOL 300 MG: 10 INJECTION, EMULSION INTRAVENOUS at 13:03

## 2019-01-15 RX ADMIN — ROCURONIUM BROMIDE 10 MG: 10 INJECTION, SOLUTION INTRAVENOUS at 13:03

## 2019-01-15 RX ADMIN — HYDROMORPHONE HYDROCHLORIDE 0.5 MG: 1 INJECTION, SOLUTION INTRAMUSCULAR; INTRAVENOUS; SUBCUTANEOUS at 14:07

## 2019-01-15 RX ADMIN — FENTANYL CITRATE 50 MCG: 50 INJECTION, SOLUTION INTRAMUSCULAR; INTRAVENOUS at 14:15

## 2019-01-15 RX ADMIN — FENTANYL CITRATE 50 MCG: 50 INJECTION, SOLUTION INTRAMUSCULAR; INTRAVENOUS at 14:10

## 2019-01-15 RX ADMIN — FAMOTIDINE 20 MG: 10 INJECTION, SOLUTION INTRAVENOUS at 12:43

## 2019-01-15 RX ADMIN — SODIUM CHLORIDE, POTASSIUM CHLORIDE, SODIUM LACTATE AND CALCIUM CHLORIDE 9 ML/HR: 600; 310; 30; 20 INJECTION, SOLUTION INTRAVENOUS at 12:43

## 2019-01-15 RX ADMIN — PROMETHAZINE HYDROCHLORIDE 12.5 MG: 25 INJECTION INTRAMUSCULAR; INTRAVENOUS at 15:30

## 2019-01-15 NOTE — H&P
Patient: Maurizio Mcnair     YOB: 1965     Medical Record Number: [id number]     Chief Complaints:  Right knee pain     History of Present Illness:      53 y.o. male patient who presents for follow-up of his right knee.  He has moderate intermittent sharp pain along with clicking and popping.  He did get his MRI.  He presents to review that study.  He reports no significant improvement in his symptoms since I last saw him.  He had one injection which helped transiently.  He tells me he has been wearing a brace and that seems to help somewhat.     Allergies:         Allergies   Allergen Reactions   • Kiwi Extract Itching and Swelling   • Latex Itching       Itch         Home Medications:     Current Outpatient Medications:   •   MG tablet, , Disp: , Rfl:   •  lisinopril-hydrochlorothiazide (PRINZIDE,ZESTORETIC) 10-12.5 MG per tablet, Take 1 tablet by mouth., Disp: , Rfl:   •  loratadine (CLARITIN) 10 MG tablet, Take 10 mg by mouth., Disp: , Rfl:   •  omeprazole (priLOSEC) 40 MG capsule, Take 40 mg by mouth., Disp: , Rfl:      Medical History        Past Medical History:   Diagnosis Date   • GERD (gastroesophageal reflux disease)     • Hypertension              Surgical History         Past Surgical History:   Procedure Laterality Date   • CATARACT EXTRACTION   04/2017   • HERNIA REPAIR   06/1988   • NASAL POLYP EXCISION   10/2016            Social History           Occupational History   • Not on file   Tobacco Use   • Smoking status: Never Smoker   • Smokeless tobacco: Never Used   Substance and Sexual Activity   • Alcohol use: No   • Drug use: No   • Sexual activity: Not on file      Social History          Social History Narrative   • Not on file               Family History   Problem Relation Age of Onset   • Emphysema Father           Review of Systems:      Constitutional: Denies fever, shaking or chills   Eyes: Denies change in visual acuity   HEENT: Denies nasal congestion or sore throat  "  Respiratory: Denies cough or shortness of breath   Cardiovascular: Denies chest pain or edema  Endocrine: Denies tremors, palpitations, intolerance of heat or cold, polyuria, polydipsia.  GI: Denies abdominal pain, nausea, vomiting, bloody stools or diarrhea  : Denies frequency, urgency, incontinence, retention, or nocturia.  Musculoskeletal: Denies numbness, tingling or loss of motor function except as above  Integument: Denies rash, lesion or ulceration   Neurologic: Denies headache or focal weakness, deficits  Heme: Denies spontaneous or excessive bleeding, epistaxis, hematuria, melena, fatigue, enlarged or tender lymph nodes.      All other pertinent positives and negatives as noted above in HPI.     Physical Exam: 53 y.o. male  Vitals       Vitals:     12/14/18 0853   Temp: 98.1 °F (36.7 °C)   Weight: 133 kg (294 lb)   Height: 182.9 cm (72\")            General:  Patient is awake and alert.  Appears in no acute distress or discomfort.     Psych:  Affect and demeanor are appropriate.     Eyes:  Conjunctiva and sclera appear grossly normal.  Eyes track well and EOM seem to be intact.     Ears:  No gross abnormalities.  Hearing adequate for the exam.     Cardiovascular:  Regular rate and rhythm.     Lungs:  Good chest expansion.  Breathing unlabored.     Extremities: Right knee is examined.  Skin is benign.  Moderate medial tenderness.  Full motion.  No instability.  Neither medial nor lateral Julianna's really seems to reproduce much discomfort for him.  His good strength throughout the leg.  Normal sensation.  Brisk capillary refill.     Imaging:  MRI of the right knee is reviewed along with the associated report.  There is a complex medial meniscal tear involving the posterior horn and mid body.  There is partial meniscal extrusion and a small parameniscal cyst.  No other significant findings noted.     Assessment/Plan:  Right knee medial meniscal tear     We will plan on proceeding with a right knee " arthroscopy and partial meniscectomy including possible chondroplasty and addressing any unforseen pathology as indicated.  I reviewed details of procedure with patient today and discussed all the risks, benefits, alternatives, and limitations of the procedure in laymen's terms with the risks including but not limited to:  neurovascular damage, bleeding, infection, hematoma, chronic pain, worsening of pain, chondrolysis, swelling, loss of motion, weakness, stiffness, instability, DVT, pulmonary embolus, death, stroke, complex regional pain syndrome, and need for additional procedures.  The patient verbalized understanding, and was given the opportunity to ask and have all questions answered today.  No guarantees were given regarding results of surgery.          Rodney Vega MD

## 2019-01-15 NOTE — BRIEF OP NOTE
KNEE ARTHROSCOPY  Progress Note    Maurizio Mcnair  1/15/2019    Pre-op Diagnosis:   Acute meniscal tear of knee, subsequent encounter [S83.209D]       Post-Op Diagnosis Codes:     * Acute meniscal tear of knee, subsequent encounter [S83.209D]    Procedure/CPT® Codes:      Procedure(s):  Knee Arthroscopy with partial medial lateral Menisectomy    Surgeon(s):  Rodney Vega MD    Anesthesia: General    Staff:   Circulator: Annmarie Richard RN; Kady Rosales RN  Scrub Person: Josee Llanes  Assistant: Bhumi Thorpe APRN    Estimated Blood Loss: minimal    Urine Voided: * No values recorded between 1/15/2019 12:59 PM and 1/15/2019  1:58 PM *    Specimens:                None      Drains:      Findings: see dictation    Complications: none      Rodney Vega MD     Date: 1/15/2019  Time: 2:02 PM

## 2019-01-15 NOTE — ANESTHESIA PROCEDURE NOTES
ANESTHESIA INTUBATION  Urgency: elective    Airway not difficult    General Information and Staff    Patient location during procedure: OR  CRNA: Kota Zavala CRNA    Indications and Patient Condition  Indications for airway management: airway protection    Preoxygenated: yes  MILS maintained throughout  Mask difficulty assessment: 1 - vent by mask    Final Airway Details  Final airway type: endotracheal airway      Successful airway: ETT    Successful intubation technique: direct laryngoscopy  Blade: Rigoberto  Blade size: 3  ETT size (mm): 7.0  Cormack-Lehane Classification: grade I - full view of glottis  Placement verified by: chest auscultation   Measured from: teeth  ETT to teeth (cm): 22  Number of attempts at approach: 1

## 2019-01-15 NOTE — ANESTHESIA POSTPROCEDURE EVALUATION
Patient: Maurizio Mcnair    Procedure Summary     Date:  01/15/19 Room / [facility]:   [facility]    Anesthesia Start:  1301 Anesthesia Stop:  1405    Procedure:  Knee Arthroscopy with partial medial lateral Menisectomy (Right Knee) Diagnosis:       Acute meniscal tear of knee, subsequent encounter      (Acute meniscal tear of knee, subsequent encounter [S81.159A])    Surgeon:  Rodney Vega MD Provider:  Bin Henderson MD    Anesthesia Type:  general ASA Status:  3          Anesthesia Type: general  Last vitals  BP   126/80 (01/15/19 1515)   Temp   36.3 °C (97.4 °F) (01/15/19 1515)   Pulse   61 (01/15/19 1515)   Resp   16 (01/15/19 1515)     SpO2   97 % (01/15/19 1515)     Post Anesthesia Care and Evaluation    Patient location during evaluation: bedside  Patient participation: complete - patient participated  Level of consciousness: awake  Pain score: 2  Pain management: adequate  Airway patency: patent  Anesthetic complications: No anesthetic complications  PONV Status: none  Cardiovascular status: acceptable  Respiratory status: acceptable  Hydration status: acceptable    Comments: /80 (BP Location: Right arm, Patient Position: Lying)   Pulse 61   Temp 36.3 °C (97.4 °F)   Resp 16   Wt (!) 148 kg (326 lb 4.5 oz)   SpO2 97%   BMI 44.25 kg/m²

## 2019-01-15 NOTE — ANESTHESIA PREPROCEDURE EVALUATION
Anesthesia Evaluation     Patient summary reviewed and Nursing notes reviewed   NPO Solid Status: > 8 hours  NPO Liquid Status: > 2 hours           Airway   Mallampati: III  no difficulty expected and Possible difficult intubation  Dental - normal exam     Pulmonary     breath sounds clear to auscultation  (+) asthma, sleep apnea,   Cardiovascular     ECG reviewed  Rhythm: regular  Rate: normal    (+) hypertension,       Neuro/Psych  GI/Hepatic/Renal/Endo    (+) morbid obesity, GERD,      Musculoskeletal     Abdominal    Substance History      OB/GYN          Other                        Anesthesia Plan    ASA 3     general     intravenous induction   Anesthetic plan, all risks, benefits, and alternatives have been provided, discussed and informed consent has been obtained with: patient.

## 2019-01-15 NOTE — OP NOTE
Orthopaedic Operative Note    Facility: [facility]    Patient: Maurizio Mcnair    Medical Record Number: [id number]    YOB: 1965    Dictating Surgeon: Rodney Vega M.D.*    Primary Care Physician: Stevie Herndon MD    Date of Operation: 1/15/2019    Pre-Operative Diagnosis:  Right knee medial meniscal tear    Post-Operative Diagnosis:  Right knee medial and lateral meniscal tears     Procedure Performed:  Right knee arthroscopy with partial medial and lateral meniscectomies    Surgeon: Rodney Vega MD     Assistant: RAY Sahu    Anesthesia: Gen. followed by local anesthesia    Complications: None.     Estimated Blood Loss: Less than 50 mL.     Specimens: * No orders in the log *    Implants: None    Brief Operative Indication:  The patient had a history of right knee pain and intermittent mechanical symptoms consistent with a meniscal tear.  The pre-operative MRI also showed a meniscal tear consistent with the clinical history.  The risks, benefits, and alternatives to a partial meniscectomy were discussed in detail.  He acknowledged understanding of the information and consented to proceed.    Description of the procedure in detail:  The patient and operative site were identified in the preoperative holding area.  The surgical site was marked.  The patient was then taken to the operating room and placed in the supine position.  Adequate general anesthesia was administered.  The patient was repositioned on the operating table.    A timeout was taken and preoperative antibiotics administered.  All bony prominences were carefully padded and protected.  The right lower extremity was prepped and draped in the standard sterile fashion.  I cleaned the extremity with an alcohol solution.  A Hibiclens scrub was performed and then the extremity was prepped with 2 ChloraPrep preps.  I allowed those to dry for 3 minutes before the draping procedure was carried out.    The  extremity was exsanguinated with an Esmarch bandage and the tourniquet insufflated to 250 mmHg.  I began the procedure itself by fashioning a standard anterolateral portal.  The scope was inserted into the joint.  At this point, a diagnostic arthroscopy was performed.    The patellofemoral compartment demonstrated low-grade chondromalacia but no full-thickness chondral defects or high-grade chondromalacia.  There were no loose bodies or other pathology noted in the suprapatellar pouch.  The medial and lateral gutters were inspected and confirmed to be free of loose bodies and/or displaced meniscal fragments.    I then entered the medial compartment.  A standard anteromedial portal was established using needle localization technique.  A probe was inserted.  He did have mild to moderate arthritis in this compartment including grade 3 chondromalacia of the medial femoral condyle involving about 20-25% of the overall articular surface as well as grade 2/3 chondromalacia of the medial tibial plateau involving about 10% of the overall articular surface.    There was a complex degenerative tear of the medial meniscus involving the posterior horn.  There was a flap tear along with a horizontal cleavage tear involving essentially the entirety of the posterior horn.  A combination of a shaver and upbiter were inserted and used to remove the unstable portions of meniscus and contour that back to a stable rim.  Once I completed the partial meniscectomy, the remainder of the meniscus was confirmed to be intact and stable.    I then directed my attention to the notch.  The ACL and PCL were intact.  The ACL was stable when probed.    The leg was placed in a figure 4 position and the lateral compartment entered.  The articular cartilage of the compartment was fairly well-preserved.  There was grade 2/3 chondral malacia of the lateral tibial plateau involving about 10-15% of the overall articular surface.  The articular cartilage of  the remainder of the compartment looked quite good.  There was a partial flap tear of the posterior horn of the lateral meniscus adjacent to the root.  Again, a combination of a shaver and upbiter were used to remove the unstable portions of that meniscus and contour that back to a stable rim.  The remainder of the meniscus was intact and stable when probed.    At this point, final images were taken and saved.  Then directed my attention to closure.  I made one final pass through all 3 compartments as well as the medial and lateral gutters to make sure that there weren't any more loose meniscal fragments.  None were identified.  The instruments were withdrawn.  The wounds were copiously irrigated out with sterile saline and then closed in a layered fashion.  Both wounds were infiltrated with local anesthetic.  Sterile dressings were applied.  The tourniquet was deflated.  The patient was awakened and taken to the recovery room in good condition.    Rodney Vega MD  01/15/19

## 2019-01-16 ENCOUNTER — TELEPHONE (OUTPATIENT)
Dept: ORTHOPEDIC SURGERY | Facility: CLINIC | Age: 54
End: 2019-01-16

## 2019-01-16 NOTE — TELEPHONE ENCOUNTER
Called patient post op and he is doing fine. He is scheduled for a f/u appt on 1/23 and advised to call with any questions or problems.

## 2019-01-16 NOTE — TELEPHONE ENCOUNTER
F/U post-op:  I spoke to Mr. Mcnair.  He is doing well.  I answered his clinical questions to his satisfaction.  I encouraged him to contact us with any questions or concerns prior to his follow up appointment. He acknowledged understanding and appreciated the call.

## 2019-01-23 ENCOUNTER — OFFICE VISIT (OUTPATIENT)
Dept: ORTHOPEDIC SURGERY | Facility: CLINIC | Age: 54
End: 2019-01-23

## 2019-01-23 VITALS — TEMPERATURE: 98.4 F | BODY MASS INDEX: 42.53 KG/M2 | WEIGHT: 314 LBS | HEIGHT: 72 IN

## 2019-01-23 DIAGNOSIS — Z98.890 S/P ARTHROSCOPIC SURGERY OF RIGHT KNEE: Primary | ICD-10-CM

## 2019-01-23 PROCEDURE — 99024 POSTOP FOLLOW-UP VISIT: CPT | Performed by: ORTHOPAEDIC SURGERY

## 2019-01-23 RX ORDER — TRAMADOL HYDROCHLORIDE 50 MG/1
50 TABLET ORAL EVERY 4 HOURS PRN
Qty: 18 TABLET | Refills: 0 | Status: SHIPPED | OUTPATIENT
Start: 2019-01-23 | End: 2019-11-22

## 2019-01-23 NOTE — PROGRESS NOTES
Maurizio Mcnair : 1965 MRN: [id number] DATE: 2019    CC: 1 week s/p right  knee scope with partial medical and lateral meniscectomies    Vitals:    19 1453   Temp: 98.4 °F (36.9 °C)     HPI:  Pt. returns to clinic today for follow up.  Denies any wound problems including redness, drainage.  No fevers, chills, sweats.  Mobilizing well.  No complaints.    Exam:  Wounds appear well-approximated without any erythema or drainage.  Calf soft.  Negative Janet's sign.  Good motor and sensory function in foot.  Good pedal pulses.  Gait mildly antalgic but otherwise reciprocal heel-toe.    Imaging   none    Impression:  1 week s/p right knee scope with partial medial and lateral meniscectomies    Plan:    1.  Sutures removed and replaced with steri-strips.  2.  Continue to progress activity gradually as tolerated.  3.  Continue to ice knee as needed, especially after activity.  4.  Follow up in 5 weeks.  5.  Ordered entered for PT.  6.  Tramadol ordered.  The risks, benefits, and alternatives to this medication were discussed.     Bhumi Thorpe, APRN     2019

## 2019-02-27 ENCOUNTER — OFFICE VISIT (OUTPATIENT)
Dept: ORTHOPEDIC SURGERY | Facility: CLINIC | Age: 54
End: 2019-02-27

## 2019-02-27 VITALS — TEMPERATURE: 99.2 F | HEIGHT: 72 IN | BODY MASS INDEX: 42.5 KG/M2 | WEIGHT: 313.8 LBS

## 2019-02-27 DIAGNOSIS — Z09 SURGERY FOLLOW-UP: Primary | ICD-10-CM

## 2019-02-27 PROCEDURE — 99024 POSTOP FOLLOW-UP VISIT: CPT | Performed by: ORTHOPAEDIC SURGERY

## 2019-02-27 NOTE — PROGRESS NOTES
Maurizio Mcnair : 1965 MRN: [id number] DATE: 2019    CC: 6 weeks s/p right  knee scope with partial medial and lateral meniscectomies    Vitals:    19 1512   Temp: 99.2 °F (37.3 °C)       HPI: Pt. returns to clinic today for follow up.  Reports some soreness but knee is progressing well overall.  Denies any concerns or problems.    Exam:  Wounds appear well healed.  Calf soft.  Negative Janet's sign.  Full knee motion.  Good motor and sensory function in foot.  Good pedal pulses.  Gait appears normal.    Imaging   none    Impression:  6 weeks s/p right  knee scope with partial medial and lateral meniscectomies    Plan:    1.  Doing well overall.  He does not quite full relative to go back to work but says that he thinks she will be ready in another couple of weeks.  2.  Have encouraged patient that residual soreness, swelling may persist for several months.  3.  Recommended icing, anti-inflammatories as needed for occasional soreness.  4.  Will release to follow up as needed going forward--encourage the patient to call in lingering soreness persists or develops any new symptoms.  5.  I will release him to go back to work as of  without restriction.  I consider him to be at [facility] as of that date.    Rodney Vega MD    2019

## 2019-08-23 ENCOUNTER — OFFICE VISIT (OUTPATIENT)
Dept: ORTHOPEDIC SURGERY | Facility: CLINIC | Age: 54
End: 2019-08-23

## 2019-08-23 VITALS — BODY MASS INDEX: 42.39 KG/M2 | HEIGHT: 72 IN | WEIGHT: 313 LBS | TEMPERATURE: 98.4 F

## 2019-08-23 DIAGNOSIS — M22.41 PATELLA, CHONDROMALACIA, RIGHT: Primary | ICD-10-CM

## 2019-08-23 DIAGNOSIS — M25.561 ACUTE PAIN OF RIGHT KNEE: ICD-10-CM

## 2019-08-23 PROCEDURE — 20610 DRAIN/INJ JOINT/BURSA W/O US: CPT | Performed by: NURSE PRACTITIONER

## 2019-08-23 PROCEDURE — 99213 OFFICE O/P EST LOW 20 MIN: CPT | Performed by: NURSE PRACTITIONER

## 2019-08-23 PROCEDURE — 73562 X-RAY EXAM OF KNEE 3: CPT | Performed by: NURSE PRACTITIONER

## 2019-08-23 RX ADMIN — METHYLPREDNISOLONE ACETATE 80 MG: 80 INJECTION, SUSPENSION INTRA-ARTICULAR; INTRALESIONAL; INTRAMUSCULAR; SOFT TISSUE at 15:40

## 2019-08-23 NOTE — PROGRESS NOTES
Chief Complaint:  Right knee pain    HPI:  Mr. Mcnair comes to clinic today with complaint of right knee pain.  In January, he had arthroscopic partial medial and lateral meniscectomies by Dr. Vega. Reports he was doing well until 1 month ago.  Denies recent injuries or precipitating event.  Describes his pain as moderate to severe, intermittent, and aching with stabbing at times.  Reports he is experiencing pain in two areas.  Sometimes he has an aching pain which begins in his right buttock and radiates down his posterior thigh around to the lateral region of his knee.  He cannot recall any precipitating factor that generates this type of pain.  At other times, he has a stabbing sensation in the anterior region of his right knee.  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  He tells me after taking 4-5 steps the pain seems to resolve.  He has tried Aleve and Tylenol without relief.  Denies numbness, tingling, or instability.      Exam:   Right knee:  Skin is benign.   No obvious gross abnormalities on inspection including any swelling, masses, atrophy or obvious adenopathy.  No palpable masses or adenopathy.  Moderate tenderness noted over the patellofemoral joint line and lateral aspect of the knee.  Motion is full and symmetric.  Some anterior crepitus noted with range of motion.  Negative medial and lateral Julianna's.  Positive patellar grind test.  No instability or patellar apprehension.  Negative straight leg raise.  Strength is well preserved including hip flexion, knee extension, ankle and toe plantarflexion, ankle inversion and eversion.  Good sensory function throughout the leg and foot.  Palpable pulses.  Gait is non-antalgic.      Imaging:  Bilateral standing AP views, bilateral merchants views and a lateral view of the right knee are ordered by myself and reviewed to evaluate the patient's complaint.  These were compared to previous x-rays.  The x-rays show no  obvious acute abnormalities, lesions, masses, significant degenerative changes, or other concerning findings.    Assessment:  1.  Right knee pain  2.  Right patellar chondromalacia     Plan:  It is difficult to pinpoint the source of his buttock and upper leg pain.  It is possible his pain may be radicular in nature, however, based on his exam this does not appear to be the case.  For the knee pain, I feel confident he is experiencing pain due to patellar chondromalacia.  Recommend we begin with conservative treatment including rest, ice, anti-inflammatories, injections, physical therapy or home exercises previously learned in physical therapy.  The patient stated their understanding and wanted to try a corticosteroid injection as well as home exercises.    The risks, benefits, and alternatives to an injection were thoroughly discussed and the patient consented to proceed.  Going forward, I will release the patient to follow-up as needed.  I informed the patient that if the pain persist and/or recurs,  I would be happy to see him back.      RAY Ross     08/23/2019    Large Joint Arthrocentesis: R knee  Date/Time: 8/23/2019 3:40 PM  Consent given by: patient  Site marked: site marked  Timeout: Immediately prior to procedure a time out was called to verify the correct patient, procedure, equipment, support staff and site/side marked as required   Supporting Documentation  Indications: pain and joint swelling   Procedure Details  Location: knee - R knee  Preparation: Patient was prepped and draped in the usual sterile fashion  Needle gauge: 21 G.  Approach: anterolateral  Medications administered: 2 mL lidocaine (cardiac); 80 mg methylPREDNISolone acetate 80 MG/ML  Patient tolerance: patient tolerated the procedure well with no immediate complications

## 2019-08-26 RX ORDER — METHYLPREDNISOLONE ACETATE 80 MG/ML
80 INJECTION, SUSPENSION INTRA-ARTICULAR; INTRALESIONAL; INTRAMUSCULAR; SOFT TISSUE
Status: COMPLETED | OUTPATIENT
Start: 2019-08-23 | End: 2019-08-23

## 2019-09-17 ENCOUNTER — TELEPHONE (OUTPATIENT)
Dept: ORTHOPEDIC SURGERY | Facility: CLINIC | Age: 54
End: 2019-09-17

## 2019-09-17 NOTE — TELEPHONE ENCOUNTER
Patient called stating the injection to his right knee on 8/23 did not help. He has scheduled a f/u appt with [facility] on 9/23 (wants to see [facility]). Said [facility] mentioned getting an MRI if injection did not help. Please advise.

## 2019-09-23 ENCOUNTER — OFFICE VISIT (OUTPATIENT)
Dept: ORTHOPEDIC SURGERY | Facility: CLINIC | Age: 54
End: 2019-09-23

## 2019-09-23 VITALS — WEIGHT: 296.6 LBS | HEIGHT: 72 IN | BODY MASS INDEX: 40.17 KG/M2 | TEMPERATURE: 97.9 F

## 2019-09-23 DIAGNOSIS — M25.561 CHRONIC PAIN OF RIGHT KNEE: ICD-10-CM

## 2019-09-23 DIAGNOSIS — Z98.890 S/P ARTHROSCOPIC SURGERY OF RIGHT KNEE: Primary | ICD-10-CM

## 2019-09-23 DIAGNOSIS — G89.29 CHRONIC PAIN OF RIGHT KNEE: ICD-10-CM

## 2019-09-23 PROCEDURE — 99214 OFFICE O/P EST MOD 30 MIN: CPT | Performed by: ORTHOPAEDIC SURGERY

## 2019-09-23 NOTE — PROGRESS NOTES
"Patient:Maurizio Mcnair    YOB: 1965    Medical Record Number:[id number]    Chief Complaints: New complaint of right knee pain    History of Present Illness:     54 y.o. male patient who presents for his right knee he tells me he feels like he went back to work too soon after surgery.  He pulled a heavy load and he felt \"something weird\" in his right knee.  He thinks that may have caused his current symptoms but he is not sure.  This pain is new over the past few months.  He did see Bhumi for this issue about a month ago and had an injection.  It helped for a period of about 3 days.  He reports moderate intermittent stabbing pains associated with a constant dull ache.  He tells me it feels exactly like it did when his meniscus was torn prior to surgery.  He reports popping, clicking and snapping in the knee associated with the pain.    Allergies:  Allergies   Allergen Reactions   • Kiwi Extract Itching and Swelling   • Latex Itching     RASH       Home Medications:    Current Outpatient Medications:   •  docusate sodium (COLACE) 100 MG capsule, Take 1 capsule by mouth 2 (Two) Times a Day., Disp: 60 capsule, Rfl: 0  •  lisinopril-hydrochlorothiazide (PRINZIDE,ZESTORETIC) 10-12.5 MG per tablet, Take 1 tablet by mouth Every Morning., Disp: , Rfl:   •  loratadine (CLARITIN) 10 MG tablet, Take 10 mg by mouth As Needed., Disp: , Rfl:   •  omeprazole (priLOSEC) 40 MG capsule, Take 40 mg by mouth Every Morning., Disp: , Rfl:   •  ondansetron (ZOFRAN) 4 MG tablet, Take 1 tablet by mouth Every 8 (Eight) Hours As Needed for Nausea or Vomiting., Disp: 30 tablet, Rfl: 0  •  traMADol (ULTRAM) 50 MG tablet, Take 1 tablet by mouth Every 4 (Four) Hours As Needed for Moderate Pain ., Disp: 18 tablet, Rfl: 0    Past Medical History:   Diagnosis Date   • Acute torn meniscus     RT KNEE   • Asthma    • GERD (gastroesophageal reflux disease)    • Hypertension    • Seasonal allergies    • Sleep apnea     C-PAP       Past " "Surgical History:   Procedure Laterality Date   • CATARACT EXTRACTION Bilateral 04/2017   • HERNIA REPAIR  06/1988   • KNEE ARTHROSCOPY Right 1/15/2019    Procedure: Knee Arthroscopy with partial medial lateral Menisectomy;  Surgeon: Rodney Vega MD;  Location: [facility] [place];  Service: Orthopedics   • NASAL POLYP EXCISION  10/2016       Social History     Occupational History   • Not on file   Tobacco Use   • Smoking status: Never Smoker   • Smokeless tobacco: Never Used   Substance and Sexual Activity   • Alcohol use: No   • Drug use: No   • Sexual activity: Defer      Social History     Social History Narrative   • Not on file       Family History   Problem Relation Age of Onset   • Emphysema Father    • Malig Hyperthermia Neg Hx        Review of Systems:      Constitutional: Denies fever, shaking or chills   Eyes: Denies change in visual acuity   HEENT: Denies nasal congestion or sore throat   Respiratory: Denies cough or shortness of breath   Cardiovascular: Denies chest pain or edema  Endocrine: Denies tremors, palpitations, intolerance of heat or cold, polyuria, polydipsia.  GI: Denies abdominal pain, nausea, vomiting, bloody stools or diarrhea  : Denies frequency, urgency, incontinence, retention, or nocturia.  Musculoskeletal: Denies numbness, tingling or loss of motor function except as above  Integument: Denies rash, lesion or ulceration   Neurologic: Denies headache or focal weakness, deficits  Heme: Denies spontaneous or excessive bleeding, epistaxis, hematuria, melena, fatigue, enlarged or tender lymph nodes.      All other pertinent positives and negatives as noted above in HPI.    Physical Exam:54 y.o. male  Vitals:    09/23/19 0840   Temp: 97.9 °F (36.6 °C)   TempSrc: Temporal   Weight: 135 kg (296 lb 9.6 oz)   Height: 182.9 cm (72\")       General:  Patient is awake and alert.  Appears in no acute distress or discomfort.    Psych:  Affect and demeanor are appropriate.    Extremities: Right " knee is examined.  Skin is benign.  Previous portals are healed.  No significant effusion.  Mild to moderate medial tenderness.  Positive medial Julianna's for pain but no mechanical phenomenon.  He has excellent motion.  Mild discomfort with hyperflexion.  No instability.  Good strength with hip flexion, knee extension, ankle and toe plantar flexion dorsal flexion.  Sensation is intact.  Brisk capillary refill.    Imaging: Previous x-rays right knee are reviewed.  He has medial compartment joint space narrowing consistent with early arthritis.  No other significant findings noted.    Assessment/Plan: Right knee pain status post partial meniscectomy, suspected arthritis, possible recurrent tear    Given the persistent nature of his complaint in combination with his mechanical symptoms, I think it prudent to refer him for an MRI at this point.  I am going to set that up for him.  I will see him back to discuss the results.  He understands and agrees.   I am going to let him go back to work on light duty restrictions.    Rodney Vega MD    09/23/2019

## 2019-10-01 ENCOUNTER — TELEPHONE (OUTPATIENT)
Dept: ORTHOPEDIC SURGERY | Facility: CLINIC | Age: 54
End: 2019-10-01

## 2019-10-01 DIAGNOSIS — G89.29 CHRONIC PAIN OF RIGHT KNEE: Primary | ICD-10-CM

## 2019-10-01 DIAGNOSIS — M25.561 CHRONIC PAIN OF RIGHT KNEE: Primary | ICD-10-CM

## 2019-10-14 ENCOUNTER — TELEPHONE (OUTPATIENT)
Dept: ORTHOPEDIC SURGERY | Facility: CLINIC | Age: 54
End: 2019-10-14

## 2019-10-14 NOTE — TELEPHONE ENCOUNTER
I spoke with him.  The MRI report was available but I do not have the images.  The MRI report describes a new tear that looks almost identical to his old tear.  I went back and looked at his arthroscopy photos.  He had an extensive tear that we cleaned out rather aggressively.  I am concerned that there may have been some kind of mixup with the radiologist reading the new MRI and instead reviewed the old MRI.  I am not sure how that could have happened but I want to look at the new MRI myself before making any decisions about whether or not to pursue surgery.  He is fully in agreement with that.  I am also going to set him up an appointment to come see me again.

## 2019-10-16 ENCOUNTER — OFFICE VISIT (OUTPATIENT)
Dept: ORTHOPEDIC SURGERY | Facility: CLINIC | Age: 54
End: 2019-10-16

## 2019-10-16 VITALS — TEMPERATURE: 97.8 F | WEIGHT: 301 LBS | BODY MASS INDEX: 40.77 KG/M2 | HEIGHT: 72 IN

## 2019-10-16 DIAGNOSIS — G89.29 CHRONIC PAIN OF RIGHT KNEE: Primary | ICD-10-CM

## 2019-10-16 DIAGNOSIS — M25.561 CHRONIC PAIN OF RIGHT KNEE: Primary | ICD-10-CM

## 2019-10-16 PROCEDURE — 99213 OFFICE O/P EST LOW 20 MIN: CPT | Performed by: ORTHOPAEDIC SURGERY

## 2019-10-17 RX ORDER — CEFAZOLIN SODIUM 2 G/100ML
2 INJECTION, SOLUTION INTRAVENOUS ONCE
Status: CANCELLED | OUTPATIENT
Start: 2019-12-03 | End: 2019-10-17

## 2019-10-17 NOTE — PROGRESS NOTES
"  Patient: Maurizio Mcnair    YOB: 1965    Medical Record Number: [id number]    Chief Complaints: Follow-up regarding right knee pain    History of Present Illness:     54 y.o. male patient who presents for aloe up of his right knee.  He continues to have pain and intermittent catching.  He did get his MRI and he presents today to review that.  He had one injection that did help transiently.  I went back and reviewed his history.  He tells me he was doing great after the knee scope.  He did fine up until he went back to work.  He does not recall a specific injury but he does recall an incident where he was pulling a heavy load and he felt something \"squish like a ketchup packet\".    Allergies:   Allergies   Allergen Reactions   • Kiwi Extract Itching and Swelling   • Latex Itching     RASH       Home Medications:    Current Outpatient Medications:   •  lisinopril-hydrochlorothiazide (PRINZIDE,ZESTORETIC) 10-12.5 MG per tablet, Take 1 tablet by mouth Every Morning., Disp: , Rfl:   •  loratadine (CLARITIN) 10 MG tablet, Take 10 mg by mouth As Needed., Disp: , Rfl:   •  omeprazole (priLOSEC) 40 MG capsule, Take 40 mg by mouth Every Morning., Disp: , Rfl:   •  docusate sodium (COLACE) 100 MG capsule, Take 1 capsule by mouth 2 (Two) Times a Day., Disp: 60 capsule, Rfl: 0  •  ondansetron (ZOFRAN) 4 MG tablet, Take 1 tablet by mouth Every 8 (Eight) Hours As Needed for Nausea or Vomiting., Disp: 30 tablet, Rfl: 0  •  traMADol (ULTRAM) 50 MG tablet, Take 1 tablet by mouth Every 4 (Four) Hours As Needed for Moderate Pain ., Disp: 18 tablet, Rfl: 0    Past Medical History:   Diagnosis Date   • Acute torn meniscus     RT KNEE   • Asthma    • GERD (gastroesophageal reflux disease)    • Hypertension    • Seasonal allergies    • Sleep apnea     C-PAP       Past Surgical History:   Procedure Laterality Date   • CATARACT EXTRACTION Bilateral 04/2017   • HERNIA REPAIR  06/1988   • KNEE ARTHROSCOPY Right 1/15/2019    " "Procedure: Knee Arthroscopy with partial medial lateral Menisectomy;  Surgeon: Rodney Vega MD;  Location: [facility] OR [facility] [place];  Service: Orthopedics   • NASAL POLYP EXCISION  10/2016       Social History     Occupational History   • Not on file   Tobacco Use   • Smoking status: Never Smoker   • Smokeless tobacco: Never Used   Substance and Sexual Activity   • Alcohol use: No   • Drug use: No   • Sexual activity: Defer      Social History     Social History Narrative   • Not on file       Family History   Problem Relation Age of Onset   • Emphysema Father    • Malig Hyperthermia Neg Hx        Review of Systems:      Constitutional: Denies fever, shaking or chills   Eyes: Denies change in visual acuity   HEENT: Denies nasal congestion or sore throat   Respiratory: Denies cough or shortness of breath   Cardiovascular: Denies chest pain or edema  Endocrine: Denies tremors, palpitations, intolerance of heat or cold, polyuria, polydipsia.  GI: Denies abdominal pain, nausea, vomiting, bloody stools or diarrhea  : Denies frequency, urgency, incontinence, retention, or nocturia.  Musculoskeletal: Denies numbness, tingling or loss of motor function except as above  Integument: Denies rash, lesion or ulceration   Neurologic: Denies headache or focal weakness, deficits  Heme: Denies spontaneous or excessive bleeding, epistaxis, hematuria, melena, fatigue, enlarged or tender lymph nodes.      All other pertinent positives and negatives as noted above in HPI.    Physical Exam: 54 y.o. male  Vitals:    10/16/19 1437   Temp: 97.8 °F (36.6 °C)   Weight: (!) 137 kg (301 lb)   Height: 182.9 cm (72\")       General:  Patient is awake and alert.  Appears in no acute distress or discomfort.    Psych:  Affect and demeanor are appropriate.    Eyes:  Conjunctiva and sclera appear grossly normal.  Eyes track well and EOM seem to be intact.    Ears:  No gross abnormalities.  Hearing adequate for the exam.    Cardiovascular:  Regular rate and " rhythm.    Lungs:  Good chest expansion.  Breathing unlabored.    Extremities: Right knee is examined.  Skin is benign.  No significant effusion.  Mild medial joint line tenderness.  Good motion.  He has discomfort with hyperflexion.  Positive medial Julianna's.  Good motor and sensory function throughout the leg and foot.  Brisk capillary refill.    Imaging: MRI right knee is reviewed along with the associated report.  He has what appears to be a complex recurrent medial meniscal tear.      Assessment/Plan: Recurrent right knee meniscal tear    I went back and reviewed his previous arthroscopy photos with him.  He had a complex tear that was debrided rather extensively.  I am surprised that he has such an extensive recurrent tear.  Unfortunately, addressing this arthroscopically is going to require the removal of most if not all of the posterior horn and a section of the mid body as well.  He is not getting better.  The injection only provided transient relief.  He wants to pursue surgical options at this point.    We will plan on proceeding with a right knee arthroscopy and partial meniscectomy including possible chondroplasty and addressing any unforseen pathology as indicated.  I reviewed details of procedure with patient today and discussed all the risks, benefits, alternatives, and limitations of the procedure in laymen's terms with the risks including but not limited to:  neurovascular damage, bleeding, infection, hematoma, chronic pain, worsening of pain, chondrolysis, swelling, loss of motion, weakness, stiffness, instability, DVT, pulmonary embolus, death, stroke, complex regional pain syndrome, and need for additional procedures.  The patient verbalized understanding, and was given the opportunity to ask and have all questions answered today.  No guarantees were given regarding results of surgery.      Rodney Vega MD    10/16/2019

## 2019-10-25 ENCOUNTER — TELEPHONE (OUTPATIENT)
Dept: ORTHOPEDIC SURGERY | Facility: CLINIC | Age: 54
End: 2019-10-25

## 2019-10-28 ENCOUNTER — TELEPHONE (OUTPATIENT)
Dept: ORTHOPEDIC SURGERY | Facility: CLINIC | Age: 54
End: 2019-10-28

## 2019-11-07 PROBLEM — M25.561 CHRONIC PAIN OF RIGHT KNEE: Status: ACTIVE | Noted: 2019-11-07

## 2019-11-07 PROBLEM — G89.29 CHRONIC PAIN OF RIGHT KNEE: Status: ACTIVE | Noted: 2019-11-07

## 2019-11-22 ENCOUNTER — APPOINTMENT (OUTPATIENT)
Dept: PREADMISSION TESTING | Facility: HOSPITAL | Age: 54
End: 2019-11-22

## 2019-11-22 VITALS
WEIGHT: 307.8 LBS | RESPIRATION RATE: 18 BRPM | HEIGHT: 72 IN | HEART RATE: 81 BPM | TEMPERATURE: 97.8 F | BODY MASS INDEX: 41.69 KG/M2 | SYSTOLIC BLOOD PRESSURE: 133 MMHG | OXYGEN SATURATION: 95 % | DIASTOLIC BLOOD PRESSURE: 81 MMHG

## 2019-11-22 DIAGNOSIS — M25.561 CHRONIC PAIN OF RIGHT KNEE: ICD-10-CM

## 2019-11-22 DIAGNOSIS — G89.29 CHRONIC PAIN OF RIGHT KNEE: ICD-10-CM

## 2019-11-22 LAB
ANION GAP SERPL CALCULATED.3IONS-SCNC: 11.5 MMOL/L (ref 5–15)
BILIRUB UR QL STRIP: NEGATIVE
BUN BLD-MCNC: 21 MG/DL (ref 6–20)
BUN/CREAT SERPL: 19.6 (ref 7–25)
CALCIUM SPEC-SCNC: 9.6 MG/DL (ref 8.6–10.5)
CHLORIDE SERPL-SCNC: 102 MMOL/L (ref 98–107)
CLARITY UR: CLEAR
CO2 SERPL-SCNC: 22.5 MMOL/L (ref 22–29)
COLOR UR: YELLOW
CREAT BLD-MCNC: 1.07 MG/DL (ref 0.76–1.27)
DEPRECATED RDW RBC AUTO: 39.7 FL (ref 37–54)
ERYTHROCYTE [DISTWIDTH] IN BLOOD BY AUTOMATED COUNT: 12.8 % (ref 12.3–15.4)
GFR SERPL CREATININE-BSD FRML MDRD: 73 ML/MIN/1.73
GLUCOSE BLD-MCNC: 107 MG/DL (ref 65–99)
GLUCOSE UR STRIP-MCNC: NEGATIVE MG/DL
HCT VFR BLD AUTO: 45.1 % (ref 37.5–51)
HGB BLD-MCNC: 15.5 G/DL (ref 13–17.7)
HGB UR QL STRIP.AUTO: NEGATIVE
KETONES UR QL STRIP: NEGATIVE
LEUKOCYTE ESTERASE UR QL STRIP.AUTO: NEGATIVE
MCH RBC QN AUTO: 29.5 PG (ref 26.6–33)
MCHC RBC AUTO-ENTMCNC: 34.4 G/DL (ref 31.5–35.7)
MCV RBC AUTO: 85.9 FL (ref 79–97)
NITRITE UR QL STRIP: NEGATIVE
PH UR STRIP.AUTO: 5.5 [PH] (ref 5–8)
PLATELET # BLD AUTO: 227 10*3/MM3 (ref 140–450)
PMV BLD AUTO: 10 FL (ref 6–12)
POTASSIUM BLD-SCNC: 4.1 MMOL/L (ref 3.5–5.2)
PROT UR QL STRIP: NEGATIVE
RBC # BLD AUTO: 5.25 10*6/MM3 (ref 4.14–5.8)
SODIUM BLD-SCNC: 136 MMOL/L (ref 136–145)
SP GR UR STRIP: 1.02 (ref 1–1.03)
UROBILINOGEN UR QL STRIP: NORMAL
WBC NRBC COR # BLD: 7.44 10*3/MM3 (ref 3.4–10.8)

## 2019-11-22 PROCEDURE — 81003 URINALYSIS AUTO W/O SCOPE: CPT | Performed by: ORTHOPAEDIC SURGERY

## 2019-11-22 PROCEDURE — 36415 COLL VENOUS BLD VENIPUNCTURE: CPT

## 2019-11-22 PROCEDURE — 80048 BASIC METABOLIC PNL TOTAL CA: CPT | Performed by: ORTHOPAEDIC SURGERY

## 2019-11-22 PROCEDURE — 93010 ELECTROCARDIOGRAM REPORT: CPT | Performed by: INTERNAL MEDICINE

## 2019-11-22 PROCEDURE — 85027 COMPLETE CBC AUTOMATED: CPT | Performed by: ORTHOPAEDIC SURGERY

## 2019-11-22 PROCEDURE — 93005 ELECTROCARDIOGRAM TRACING: CPT

## 2019-11-22 NOTE — DISCHARGE INSTRUCTIONS
Take the following medications the morning of surgery with a small sip of water:  OMEPRAZOLE  BRING LISINOPRIL      General Instructions:  • Do not eat solid food after midnight the night before surgery.  • You may drink clear liquids day of surgery but must stop at least one hour before your hospital arrival time.  • It is beneficial for you to have a clear drink that contains carbohydrates the day of surgery.  We suggest a 12 to 20 ounce bottle of Gatorade or Powerade for non-diabetic patients or a 12 to 20 ounce bottle of G2 or Powerade Zero for diabetic patients. (Pediatric patients, are not advised to drink a 12 to 20 ounce carbohydrate drink)    Clear liquids are liquids you can see through.  Nothing red in color.     Plain water                               Sports drinks  Sodas                                   Gelatin (Jell-O)  Fruit juices without pulp such as white grape juice and apple juice  Popsicles that contain no fruit or yogurt  Tea or coffee (no cream or milk added)  Gatorade / Powerade  G2 / Powerade Zero    • Infants may have breast milk up to four hours before surgery.  • Infants drinking formula may drink formula up to six hours before surgery.   • Patients who avoid smoking, chewing tobacco and alcohol for 4 weeks prior to surgery have a reduced risk of post-operative complications.  Quit smoking as many days before surgery as you can.  • Do not smoke, use chewing tobacco or drink alcohol the day of surgery.   • If applicable bring your C-PAP/ BI-PAP machine.  • Bring any papers given to you in the doctor’s office.  • Wear clean comfortable clothes.  • Do not wear contact lenses, false eyelashes or make-up.  Bring a case for your glasses.   • Bring crutches or walker if applicable.  • Remove all piercings.  Leave jewelry and any other valuables at home.  • Hair extensions with metal clips must be removed prior to surgery.  • The Pre-Admission Testing nurse will instruct you to bring  medications if unable to obtain an accurate list in Pre-Admission Testing.        If you were given a blood bank ID arm band remember to bring it with you the day of surgery.    Preventing a Surgical Site Infection:  • For 2 to 3 days before surgery, avoid shaving with a razor because the razor can irritate skin and make it easier to develop an infection.    • Any areas of open skin can increase the risk of a post-operative wound infection by allowing bacteria to enter and travel throughout the body.  Notify your surgeon if you have any skin wounds / rashes even if it is not near the expected surgical site.  The area will need assessed to determine if surgery should be delayed until it is healed.  • The night prior to surgery sleep in a clean bed with clean clothing.  Do not allow pets to sleep with you.  • Shower on the morning of surgery using a fresh bar of anti-bacterial soap (such as Dial) and clean washcloth.  Dry with a clean towel and dress in clean clothing.  • Ask your surgeon if you will be receiving antibiotics prior to surgery.  • Make sure you, your family, and all healthcare providers clean their hands with soap and water or an alcohol based hand  before caring for you or your wound.    Day of surgery: 12/3/2019 PT TO BE NOTIFIED OF ARRIVAL TIME  Your arrival time is approximately two hours before your scheduled surgery time.  Upon arrival, a Pre-op nurse and Anesthesiologist will review your health history, obtain vital signs, and answer questions you may have.  The only belongings needed at this time will be a list of your home medications and if applicable your C-PAP/BI-PAP machine.  If you are staying overnight your family can leave the rest of your belongings in the car and bring them to your room later.  A Pre-op nurse will start an IV and you may receive medication in preparation for surgery, including something to help you relax.  Your family will be able to see you in the Pre-op  area.  Two visitors at a time will be allowed in the Pre-op room.  While you are in surgery your family should notify the waiting room  if they leave the waiting room area and provide a contact phone number.    Please be aware that surgery does come with discomfort.  We want to make every effort to control your discomfort so please discuss any uncontrolled symptoms with your nurse.   Your doctor will most likely have prescribed pain medications.      If you are going home after surgery you will receive individualized written care instructions before being discharged.  A responsible adult must drive you to and from the hospital on the day of your surgery and stay with you for 24 hours.    If you are staying overnight following surgery, you will be transported to your hospital room following the recovery period.  [facility] has all private rooms.    You have received a list of surgical assistants for your reference.  If you have any questions please call Pre-Admission Testing at [phone].  Deductibles and co-payments are collected on the day of service. Please be prepared to pay the required co-pay, deductible or deposit on the day of service as defined by your plan.

## 2019-12-02 ENCOUNTER — TELEPHONE (OUTPATIENT)
Dept: ORTHOPEDIC SURGERY | Facility: CLINIC | Age: 54
End: 2019-12-02

## 2019-12-02 NOTE — TELEPHONE ENCOUNTER
Patient having surgery tomorrow and says he has not gotten an ice machine yet. Thought he got one BEFORE surgery last time. Please advise.

## 2019-12-03 ENCOUNTER — ANESTHESIA (OUTPATIENT)
Dept: PERIOP | Facility: HOSPITAL | Age: 54
End: 2019-12-03

## 2019-12-03 ENCOUNTER — ANESTHESIA EVENT (OUTPATIENT)
Dept: PERIOP | Facility: HOSPITAL | Age: 54
End: 2019-12-03

## 2019-12-03 ENCOUNTER — HOSPITAL ENCOUNTER (OUTPATIENT)
Facility: HOSPITAL | Age: 54
Setting detail: HOSPITAL OUTPATIENT SURGERY
Discharge: HOME | End: 2019-12-03
Attending: ORTHOPAEDIC SURGERY | Admitting: ORTHOPAEDIC SURGERY

## 2019-12-03 VITALS
TEMPERATURE: 97.9 F | RESPIRATION RATE: 16 BRPM | DIASTOLIC BLOOD PRESSURE: 82 MMHG | SYSTOLIC BLOOD PRESSURE: 140 MMHG | HEART RATE: 65 BPM | OXYGEN SATURATION: 93 %

## 2019-12-03 DIAGNOSIS — M25.561 CHRONIC PAIN OF RIGHT KNEE: ICD-10-CM

## 2019-12-03 DIAGNOSIS — G89.29 CHRONIC PAIN OF RIGHT KNEE: ICD-10-CM

## 2019-12-03 DIAGNOSIS — S83.209D: Primary | ICD-10-CM

## 2019-12-03 PROCEDURE — 25010000002 PROPOFOL 10 MG/ML EMULSION: Performed by: NURSE ANESTHETIST, CERTIFIED REGISTERED

## 2019-12-03 PROCEDURE — 25010000002 FENTANYL CITRATE (PF) 100 MCG/2ML SOLUTION: Performed by: NURSE ANESTHETIST, CERTIFIED REGISTERED

## 2019-12-03 PROCEDURE — 25010000002 SUCCINYLCHOLINE PER 20 MG: Performed by: NURSE ANESTHETIST, CERTIFIED REGISTERED

## 2019-12-03 PROCEDURE — 25010000002 NEOSTIGMINE PER 0.5 MG: Performed by: NURSE ANESTHETIST, CERTIFIED REGISTERED

## 2019-12-03 PROCEDURE — 25010000002 HYDROMORPHONE PER 4 MG: Performed by: NURSE ANESTHETIST, CERTIFIED REGISTERED

## 2019-12-03 PROCEDURE — 25010000002 DEXAMETHASONE PER 1 MG: Performed by: NURSE ANESTHETIST, CERTIFIED REGISTERED

## 2019-12-03 PROCEDURE — 25010000003 CEFAZOLIN IN DEXTROSE 2-4 GM/100ML-% SOLUTION: Performed by: ORTHOPAEDIC SURGERY

## 2019-12-03 PROCEDURE — 25010000002 ONDANSETRON PER 1 MG: Performed by: NURSE ANESTHETIST, CERTIFIED REGISTERED

## 2019-12-03 PROCEDURE — 29880 ARTHRS KNE SRG MNISECTMY M&L: CPT | Performed by: ORTHOPAEDIC SURGERY

## 2019-12-03 PROCEDURE — 25010000002 MIDAZOLAM PER 1 MG: Performed by: ANESTHESIOLOGY

## 2019-12-03 RX ORDER — FENTANYL CITRATE 50 UG/ML
50 INJECTION, SOLUTION INTRAMUSCULAR; INTRAVENOUS
Status: DISCONTINUED | OUTPATIENT
Start: 2019-12-03 | End: 2019-12-03 | Stop reason: HOSPADM

## 2019-12-03 RX ORDER — DIPHENHYDRAMINE HCL 25 MG
25 CAPSULE ORAL
Status: DISCONTINUED | OUTPATIENT
Start: 2019-12-03 | End: 2019-12-03 | Stop reason: HOSPADM

## 2019-12-03 RX ORDER — ONDANSETRON 2 MG/ML
INJECTION INTRAMUSCULAR; INTRAVENOUS AS NEEDED
Status: DISCONTINUED | OUTPATIENT
Start: 2019-12-03 | End: 2019-12-03 | Stop reason: SURG

## 2019-12-03 RX ORDER — FENTANYL CITRATE 50 UG/ML
INJECTION, SOLUTION INTRAMUSCULAR; INTRAVENOUS AS NEEDED
Status: DISCONTINUED | OUTPATIENT
Start: 2019-12-03 | End: 2019-12-03 | Stop reason: SURG

## 2019-12-03 RX ORDER — SODIUM CHLORIDE, SODIUM LACTATE, POTASSIUM CHLORIDE, AND CALCIUM CHLORIDE .6; .31; .03; .02 G/100ML; G/100ML; G/100ML; G/100ML
IRRIGANT IRRIGATION AS NEEDED
Status: DISCONTINUED | OUTPATIENT
Start: 2019-12-03 | End: 2019-12-03 | Stop reason: HOSPADM

## 2019-12-03 RX ORDER — ROCURONIUM BROMIDE 10 MG/ML
INJECTION, SOLUTION INTRAVENOUS AS NEEDED
Status: DISCONTINUED | OUTPATIENT
Start: 2019-12-03 | End: 2019-12-03 | Stop reason: SURG

## 2019-12-03 RX ORDER — DIPHENHYDRAMINE HYDROCHLORIDE 50 MG/ML
12.5 INJECTION INTRAMUSCULAR; INTRAVENOUS
Status: DISCONTINUED | OUTPATIENT
Start: 2019-12-03 | End: 2019-12-03 | Stop reason: HOSPADM

## 2019-12-03 RX ORDER — BUPIVACAINE HYDROCHLORIDE AND EPINEPHRINE 5; 5 MG/ML; UG/ML
INJECTION, SOLUTION PERINEURAL AS NEEDED
Status: DISCONTINUED | OUTPATIENT
Start: 2019-12-03 | End: 2019-12-03 | Stop reason: HOSPADM

## 2019-12-03 RX ORDER — LABETALOL HYDROCHLORIDE 5 MG/ML
5 INJECTION, SOLUTION INTRAVENOUS
Status: DISCONTINUED | OUTPATIENT
Start: 2019-12-03 | End: 2019-12-03 | Stop reason: HOSPADM

## 2019-12-03 RX ORDER — EPHEDRINE SULFATE 50 MG/ML
5 INJECTION, SOLUTION INTRAVENOUS ONCE AS NEEDED
Status: DISCONTINUED | OUTPATIENT
Start: 2019-12-03 | End: 2019-12-03 | Stop reason: HOSPADM

## 2019-12-03 RX ORDER — FAMOTIDINE 10 MG/ML
20 INJECTION, SOLUTION INTRAVENOUS ONCE
Status: COMPLETED | OUTPATIENT
Start: 2019-12-03 | End: 2019-12-03

## 2019-12-03 RX ORDER — MIDAZOLAM HYDROCHLORIDE 1 MG/ML
1 INJECTION INTRAMUSCULAR; INTRAVENOUS
Status: DISCONTINUED | OUTPATIENT
Start: 2019-12-03 | End: 2019-12-03 | Stop reason: HOSPADM

## 2019-12-03 RX ORDER — FLUMAZENIL 0.1 MG/ML
0.2 INJECTION INTRAVENOUS AS NEEDED
Status: DISCONTINUED | OUTPATIENT
Start: 2019-12-03 | End: 2019-12-03 | Stop reason: HOSPADM

## 2019-12-03 RX ORDER — PROMETHAZINE HYDROCHLORIDE 25 MG/ML
6.25 INJECTION, SOLUTION INTRAMUSCULAR; INTRAVENOUS ONCE AS NEEDED
Status: DISCONTINUED | OUTPATIENT
Start: 2019-12-03 | End: 2019-12-03 | Stop reason: HOSPADM

## 2019-12-03 RX ORDER — ACETAMINOPHEN 650 MG/1
650 SUPPOSITORY RECTAL ONCE AS NEEDED
Status: DISCONTINUED | OUTPATIENT
Start: 2019-12-03 | End: 2019-12-03 | Stop reason: HOSPADM

## 2019-12-03 RX ORDER — ONDANSETRON 4 MG/1
4 TABLET, FILM COATED ORAL EVERY 8 HOURS PRN
Qty: 30 TABLET | Refills: 0 | Status: SHIPPED | OUTPATIENT
Start: 2019-12-03 | End: 2020-06-08

## 2019-12-03 RX ORDER — NALOXONE HCL 0.4 MG/ML
0.2 VIAL (ML) INJECTION AS NEEDED
Status: DISCONTINUED | OUTPATIENT
Start: 2019-12-03 | End: 2019-12-03 | Stop reason: HOSPADM

## 2019-12-03 RX ORDER — HYDROMORPHONE HYDROCHLORIDE 1 MG/ML
0.5 INJECTION, SOLUTION INTRAMUSCULAR; INTRAVENOUS; SUBCUTANEOUS
Status: DISCONTINUED | OUTPATIENT
Start: 2019-12-03 | End: 2019-12-03 | Stop reason: HOSPADM

## 2019-12-03 RX ORDER — OXYCODONE AND ACETAMINOPHEN 7.5; 325 MG/1; MG/1
1 TABLET ORAL EVERY 4 HOURS PRN
Status: DISCONTINUED | OUTPATIENT
Start: 2019-12-03 | End: 2019-12-03 | Stop reason: HOSPADM

## 2019-12-03 RX ORDER — DEXAMETHASONE SODIUM PHOSPHATE 10 MG/ML
INJECTION INTRAMUSCULAR; INTRAVENOUS AS NEEDED
Status: DISCONTINUED | OUTPATIENT
Start: 2019-12-03 | End: 2019-12-03 | Stop reason: SURG

## 2019-12-03 RX ORDER — SUCCINYLCHOLINE CHLORIDE 20 MG/ML
INJECTION INTRAMUSCULAR; INTRAVENOUS AS NEEDED
Status: DISCONTINUED | OUTPATIENT
Start: 2019-12-03 | End: 2019-12-03 | Stop reason: SURG

## 2019-12-03 RX ORDER — SODIUM CHLORIDE, SODIUM LACTATE, POTASSIUM CHLORIDE, CALCIUM CHLORIDE 600; 310; 30; 20 MG/100ML; MG/100ML; MG/100ML; MG/100ML
9 INJECTION, SOLUTION INTRAVENOUS CONTINUOUS
Status: DISCONTINUED | OUTPATIENT
Start: 2019-12-03 | End: 2019-12-03 | Stop reason: HOSPADM

## 2019-12-03 RX ORDER — OXYCODONE AND ACETAMINOPHEN 7.5; 325 MG/1; MG/1
1 TABLET ORAL ONCE AS NEEDED
Status: DISCONTINUED | OUTPATIENT
Start: 2019-12-03 | End: 2019-12-03 | Stop reason: HOSPADM

## 2019-12-03 RX ORDER — HYDROCODONE BITARTRATE AND ACETAMINOPHEN 7.5; 325 MG/1; MG/1
1 TABLET ORAL ONCE AS NEEDED
Status: DISCONTINUED | OUTPATIENT
Start: 2019-12-03 | End: 2019-12-03 | Stop reason: HOSPADM

## 2019-12-03 RX ORDER — MIDAZOLAM HYDROCHLORIDE 1 MG/ML
2 INJECTION INTRAMUSCULAR; INTRAVENOUS
Status: DISCONTINUED | OUTPATIENT
Start: 2019-12-03 | End: 2019-12-03 | Stop reason: HOSPADM

## 2019-12-03 RX ORDER — ONDANSETRON 2 MG/ML
4 INJECTION INTRAMUSCULAR; INTRAVENOUS ONCE AS NEEDED
Status: COMPLETED | OUTPATIENT
Start: 2019-12-03 | End: 2019-12-03

## 2019-12-03 RX ORDER — ACETAMINOPHEN 325 MG/1
650 TABLET ORAL ONCE AS NEEDED
Status: DISCONTINUED | OUTPATIENT
Start: 2019-12-03 | End: 2019-12-03 | Stop reason: HOSPADM

## 2019-12-03 RX ORDER — HYDRALAZINE HYDROCHLORIDE 20 MG/ML
5 INJECTION INTRAMUSCULAR; INTRAVENOUS
Status: DISCONTINUED | OUTPATIENT
Start: 2019-12-03 | End: 2019-12-03 | Stop reason: HOSPADM

## 2019-12-03 RX ORDER — FENTANYL CITRATE 50 UG/ML
100 INJECTION, SOLUTION INTRAMUSCULAR; INTRAVENOUS
Status: DISCONTINUED | OUTPATIENT
Start: 2019-12-03 | End: 2019-12-03 | Stop reason: HOSPADM

## 2019-12-03 RX ORDER — DOCUSATE SODIUM 100 MG/1
100 CAPSULE, LIQUID FILLED ORAL 2 TIMES DAILY
Qty: 60 CAPSULE | Refills: 0 | Status: SHIPPED | OUTPATIENT
Start: 2019-12-03 | End: 2020-06-08

## 2019-12-03 RX ORDER — HYDROCODONE BITARTRATE AND ACETAMINOPHEN 7.5; 325 MG/1; MG/1
1 TABLET ORAL ONCE AS NEEDED
Status: COMPLETED | OUTPATIENT
Start: 2019-12-03 | End: 2019-12-03

## 2019-12-03 RX ORDER — PROPOFOL 10 MG/ML
VIAL (ML) INTRAVENOUS AS NEEDED
Status: DISCONTINUED | OUTPATIENT
Start: 2019-12-03 | End: 2019-12-03 | Stop reason: SURG

## 2019-12-03 RX ORDER — HYDROMORPHONE HCL 110MG/55ML
PATIENT CONTROLLED ANALGESIA SYRINGE INTRAVENOUS AS NEEDED
Status: DISCONTINUED | OUTPATIENT
Start: 2019-12-03 | End: 2019-12-03 | Stop reason: SURG

## 2019-12-03 RX ORDER — HYDROCODONE BITARTRATE AND ACETAMINOPHEN 7.5; 325 MG/1; MG/1
1-2 TABLET ORAL EVERY 4 HOURS PRN
Qty: 42 TABLET | Refills: 0 | Status: SHIPPED | OUTPATIENT
Start: 2019-12-03 | End: 2020-06-08

## 2019-12-03 RX ORDER — PROMETHAZINE HYDROCHLORIDE 25 MG/1
25 TABLET ORAL ONCE AS NEEDED
Status: DISCONTINUED | OUTPATIENT
Start: 2019-12-03 | End: 2019-12-03 | Stop reason: HOSPADM

## 2019-12-03 RX ORDER — PROMETHAZINE HYDROCHLORIDE 25 MG/1
25 SUPPOSITORY RECTAL ONCE AS NEEDED
Status: DISCONTINUED | OUTPATIENT
Start: 2019-12-03 | End: 2019-12-03 | Stop reason: HOSPADM

## 2019-12-03 RX ORDER — GLYCOPYRROLATE 0.2 MG/ML
INJECTION INTRAMUSCULAR; INTRAVENOUS AS NEEDED
Status: DISCONTINUED | OUTPATIENT
Start: 2019-12-03 | End: 2019-12-03 | Stop reason: SURG

## 2019-12-03 RX ORDER — PROMETHAZINE HYDROCHLORIDE 25 MG/ML
12.5 INJECTION, SOLUTION INTRAMUSCULAR; INTRAVENOUS ONCE AS NEEDED
Status: DISCONTINUED | OUTPATIENT
Start: 2019-12-03 | End: 2019-12-03 | Stop reason: HOSPADM

## 2019-12-03 RX ORDER — SODIUM CHLORIDE 0.9 % (FLUSH) 0.9 %
3-10 SYRINGE (ML) INJECTION AS NEEDED
Status: DISCONTINUED | OUTPATIENT
Start: 2019-12-03 | End: 2019-12-03 | Stop reason: HOSPADM

## 2019-12-03 RX ORDER — CEFAZOLIN SODIUM 2 G/100ML
2 INJECTION, SOLUTION INTRAVENOUS ONCE
Status: COMPLETED | OUTPATIENT
Start: 2019-12-03 | End: 2019-12-03

## 2019-12-03 RX ORDER — LIDOCAINE HYDROCHLORIDE 10 MG/ML
0.5 INJECTION, SOLUTION EPIDURAL; INFILTRATION; INTRACAUDAL; PERINEURAL ONCE AS NEEDED
Status: DISCONTINUED | OUTPATIENT
Start: 2019-12-03 | End: 2019-12-03 | Stop reason: HOSPADM

## 2019-12-03 RX ORDER — SODIUM CHLORIDE 0.9 % (FLUSH) 0.9 %
3 SYRINGE (ML) INJECTION EVERY 12 HOURS SCHEDULED
Status: DISCONTINUED | OUTPATIENT
Start: 2019-12-03 | End: 2019-12-03 | Stop reason: HOSPADM

## 2019-12-03 RX ORDER — LIDOCAINE HYDROCHLORIDE 20 MG/ML
INJECTION, SOLUTION INFILTRATION; PERINEURAL AS NEEDED
Status: DISCONTINUED | OUTPATIENT
Start: 2019-12-03 | End: 2019-12-03 | Stop reason: SURG

## 2019-12-03 RX ORDER — ONDANSETRON 2 MG/ML
4 INJECTION INTRAMUSCULAR; INTRAVENOUS ONCE AS NEEDED
Status: DISCONTINUED | OUTPATIENT
Start: 2019-12-03 | End: 2019-12-03 | Stop reason: HOSPADM

## 2019-12-03 RX ORDER — PROMETHAZINE HYDROCHLORIDE 25 MG/ML
6.25 INJECTION, SOLUTION INTRAMUSCULAR; INTRAVENOUS
Status: DISCONTINUED | OUTPATIENT
Start: 2019-12-03 | End: 2019-12-03 | Stop reason: HOSPADM

## 2019-12-03 RX ADMIN — GLYCOPYRROLATE 0.4 MG: 0.2 INJECTION INTRAMUSCULAR; INTRAVENOUS at 14:34

## 2019-12-03 RX ADMIN — SUCCINYLCHOLINE CHLORIDE 200 MG: 20 INJECTION, SOLUTION INTRAMUSCULAR; INTRAVENOUS at 13:45

## 2019-12-03 RX ADMIN — FENTANYL CITRATE 50 MCG: 50 INJECTION, SOLUTION INTRAMUSCULAR; INTRAVENOUS at 15:03

## 2019-12-03 RX ADMIN — FENTANYL CITRATE 50 MCG: 50 INJECTION INTRAMUSCULAR; INTRAVENOUS at 14:10

## 2019-12-03 RX ADMIN — HYDROMORPHONE HYDROCHLORIDE 0.5 MG: 1 INJECTION, SOLUTION INTRAMUSCULAR; INTRAVENOUS; SUBCUTANEOUS at 15:06

## 2019-12-03 RX ADMIN — SODIUM CHLORIDE, POTASSIUM CHLORIDE, SODIUM LACTATE AND CALCIUM CHLORIDE 9 ML/HR: 600; 310; 30; 20 INJECTION, SOLUTION INTRAVENOUS at 12:33

## 2019-12-03 RX ADMIN — ROCURONIUM BROMIDE 20 MG: 10 INJECTION, SOLUTION INTRAVENOUS at 14:10

## 2019-12-03 RX ADMIN — FENTANYL CITRATE 50 MCG: 50 INJECTION INTRAMUSCULAR; INTRAVENOUS at 14:14

## 2019-12-03 RX ADMIN — PROPOFOL 200 MG: 10 INJECTION, EMULSION INTRAVENOUS at 13:45

## 2019-12-03 RX ADMIN — ONDANSETRON 4 MG: 2 INJECTION INTRAMUSCULAR; INTRAVENOUS at 14:55

## 2019-12-03 RX ADMIN — ONDANSETRON HYDROCHLORIDE 4 MG: 2 SOLUTION INTRAMUSCULAR; INTRAVENOUS at 13:49

## 2019-12-03 RX ADMIN — Medication 3 MG: at 14:34

## 2019-12-03 RX ADMIN — CEFAZOLIN SODIUM 2 G: 2 INJECTION, SOLUTION INTRAVENOUS at 13:49

## 2019-12-03 RX ADMIN — FENTANYL CITRATE 50 MCG: 50 INJECTION, SOLUTION INTRAMUSCULAR; INTRAVENOUS at 14:56

## 2019-12-03 RX ADMIN — HYDROMORPHONE HYDROCHLORIDE 1 MG: 2 INJECTION, SOLUTION INTRAMUSCULAR; INTRAVENOUS; SUBCUTANEOUS at 14:47

## 2019-12-03 RX ADMIN — LIDOCAINE HYDROCHLORIDE 100 MG: 20 INJECTION, SOLUTION INFILTRATION; PERINEURAL at 13:45

## 2019-12-03 RX ADMIN — FAMOTIDINE 20 MG: 10 INJECTION INTRAVENOUS at 12:33

## 2019-12-03 RX ADMIN — FENTANYL CITRATE 100 MCG: 50 INJECTION INTRAMUSCULAR; INTRAVENOUS at 13:45

## 2019-12-03 RX ADMIN — HYDROCODONE BITARTRATE AND ACETAMINOPHEN 1 TABLET: 7.5; 325 TABLET ORAL at 14:56

## 2019-12-03 RX ADMIN — DEXAMETHASONE SODIUM PHOSPHATE 4 MG: 10 INJECTION INTRAMUSCULAR; INTRAVENOUS at 13:49

## 2019-12-03 RX ADMIN — MIDAZOLAM 2 MG: 1 INJECTION INTRAMUSCULAR; INTRAVENOUS at 12:33

## 2019-12-03 NOTE — BRIEF OP NOTE
KNEE ARTHROSCOPY  Progress Note    Maurizio Mcnair  12/3/2019    Pre-op Diagnosis:   Chronic pain of right knee [M25.561, G89.29]       Post-Op Diagnosis Codes:     * Chronic pain of right knee [M25.561, G89.29]    Procedure/CPT® Codes:      Procedure(s):  Knee Arthroscopy with Menisectomy    Surgeon(s):  Rodney Vega MD    Anesthesia: General    Staff:   Circulator: Annmarie Richard RN  Scrub Person: Meliza Segundo  Assistant: Bhumi Thorpe APRN    Estimated Blood Loss: minimal    Urine Voided: * No values recorded between 12/3/2019  1:37 PM and 12/3/2019  2:40 PM *    Specimens:                None          Drains:      Findings: See dictation    Complications: none      Rodney Vega MD     Date: 12/3/2019  Time: 2:40 PM

## 2019-12-03 NOTE — ANESTHESIA POSTPROCEDURE EVALUATION
Patient: Maurizio Mcnair    Procedure Summary     Date:  12/03/19 Room / Location:   [facility]    Anesthesia Start:  1338 Anesthesia Stop:  1449    Procedure:  Knee Arthroscopy with Menisectomy (Right Knee) Diagnosis:       Chronic pain of right knee      (Chronic pain of right knee [M25.561, G89.29])    Surgeon:  Rodney Vega MD Provider:  Lex Alcantar MD    Anesthesia Type:  general ASA Status:  3          Anesthesia Type: general  Last vitals  BP   140/82 (12/03/19 1500)   Temp   36.6 °C (97.9 °F) (12/03/19 1514)   Pulse   59 (12/03/19 1514)   Resp   15 (12/03/19 1500)     SpO2   97 % (12/03/19 1514)     Post Anesthesia Care and Evaluation    Patient location during evaluation: bedside  Patient participation: complete - patient participated  Level of consciousness: awake  Pain score: 1  Pain management: adequate  Airway patency: patent  Anesthetic complications: No anesthetic complications  PONV Status: controlled  Cardiovascular status: acceptable  Respiratory status: acceptable  Hydration status: acceptable    Comments: ---------------------------               12/03/19                      1514         ---------------------------   BP:                         Pulse:         59           Resp:                       Temp:   36.6 °C (97.9 °F)   SpO2:          97%         ---------------------------

## 2019-12-03 NOTE — ANESTHESIA PROCEDURE NOTES
Airway  Urgency: elective    Date/Time: 12/3/2019 1:46 PM  Airway not difficult    General Information and Staff    Patient location during procedure: OR  CRNA: Jailene Lantigua CRNA    Indications and Patient Condition  Indications for airway management: airway protection    Preoxygenated: yes  Mask difficulty assessment: 2 - vent by mask + OA or adjuvant +/- NMBA    Final Airway Details  Final airway type: endotracheal airway      Successful airway: ETT  Cuffed: yes   Successful intubation technique: direct laryngoscopy  Endotracheal tube insertion site: oral  Blade: Williamson  Blade size: 2  ETT size (mm): 7.5  Cormack-Lehane Classification: grade I - full view of glottis  Placement verified by: chest auscultation and capnometry   Cuff volume (mL): 10  Measured from: lips  ETT/EBT  to lips (cm): 21  Number of attempts at approach: 1    Additional Comments  Atraumatic placement of ETT, dentition unchanged from preop.

## 2019-12-03 NOTE — ANESTHESIA PREPROCEDURE EVALUATION
Anesthesia Evaluation     Patient summary reviewed and Nursing notes reviewed   NPO Solid Status: > 8 hours             Airway   Mallampati: I  TM distance: >3 FB  Neck ROM: full  no difficulty expected  Dental - normal exam     Pulmonary - normal exam   (+) asthma,sleep apnea,   Cardiovascular - normal exam    (+) hypertension,       Neuro/Psych- negative ROS  GI/Hepatic/Renal/Endo    (+) morbid obesity,      Musculoskeletal (-) negative ROS    Abdominal  - normal exam   Substance History - negative use     OB/GYN negative ob/gyn ROS         Other                        Anesthesia Plan    ASA 3     general     intravenous induction     Anesthetic plan, all risks, benefits, and alternatives have been provided, discussed and informed consent has been obtained with: patient.    Plan discussed with CRNA.

## 2019-12-03 NOTE — OP NOTE
Orthopaedic Operative Note    Facility: [facility]    Patient: Maurizio Mcnair    Medical Record Number: [id number]    YOB: 1965    Dictating Surgeon: Rodney Vega M.D.*    Primary Care Physician: Stevie Herndon MD    Date of Operation: 12/3/2019    Pre-Operative Diagnosis:  Right knee recurrent medial meniscal tear    Post-Operative Diagnosis:  Right knee recurrent medial meniscal tear, posterior horn lateral meniscal tear    Procedure Performed:  Right knee arthroscopy with partial medial and lateral meniscectomies    Surgeon: Rodney Vega MD     Assistant: Bhumi Thorpe    Anesthesia: Gen. followed by local anesthesia    Complications: None.     Estimated Blood Loss: Less than 50 mL.     Specimens: * No orders in the log *    Implants: None    Brief Operative Indication:  The patient had a history of right knee pain and intermittent mechanical symptoms consistent with a recurrent meniscal tear.  The pre-operative MRI also showed a meniscal tear consistent with the clinical history.  The risks, benefits, and alternatives to a partial meniscectomy were discussed in detail.  He acknowledged understanding of the information and consented to proceed.    Description of the procedure in detail:  The patient and operative site were identified in the preoperative holding area.  The surgical site was marked.  The patient was then taken to the operating room and placed in the supine position.  Adequate general anesthesia was administered.  The patient was repositioned on the operating table.    A timeout was taken and preoperative antibiotics administered.  All bony prominences were carefully padded and protected.  The right lower extremity was prepped and draped in the standard sterile fashion.  I cleaned the extremity with an alcohol solution.  A Hibiclens scrub was performed and then the extremity was prepped with 2 ChloraPrep preps.  I allowed those to dry for 3 minutes before  the draping procedure was carried out.    The extremity was exsanguinated with an Esmarch bandage and the tourniquet insufflated to 250 mmHg.  I began the procedure itself by fashioning a standard anterolateral portal.  The scope was inserted into the joint.  At this point, a diagnostic arthroscopy was performed.    The patellofemoral compartment demonstrated some arthritis.  No worse than his previous arthroscopy.  His chondral wear predominantly involve the trochlea.  The medial and lateral gutters were free of loose bodies or displaced meniscal fragments.    I then entered the medial compartment.  A standard anteromedial portal was established using needle localization technique.  A probe was inserted.  He did have a tear of the posterior horn of the medial meniscus.  This was debrided with a combination of a shaver and upbiter.  His articular cartilage in this compartment demonstrated some mild chondromalacia but no full-thickness chondral loss.  Once I completed the partial meniscectomy of the posterior horn and posterior mid body, the remainder of  the meniscus was probed and confirmed to be intact and stable.    I then directed my attention to the notch.  The ACL and PCL were intact.  The ACL was stable when probed.    The leg was placed in a figure 4 position and the lateral compartment entered.  As predicted by his MRI, there was a partial tear of the posterior horn of the lateral meniscus adjacent to the root.  This was debrided with a shaver.  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  This involved about 10-15% of the overall articular surface.    At this point, final images were taken and saved.  Then directed my attention to closure.  I made one final pass through all 3 compartments as well as the medial and lateral gutters.  No further pathology was identified.  The instruments were withdrawn.  The wounds were copiously irrigated out with  sterile saline and then closed in a layered fashion.  Both wounds were infiltrated with local anesthetic.  Sterile dressings were applied.  The tourniquet was deflated.  The patient was awakened and taken to the recovery room in good condition.    Rodney Vega MD  12/03/19

## 2019-12-03 NOTE — H&P
"  Patient: Maurizio Mcnair    YOB: 1965    Medical Record Number: [id number]    Chief Complaints: Follow-up regarding right knee pain    History of Present Illness:     54 y.o. male patient who presents for aloe up of his right knee.  He continues to have pain and intermittent catching.  He did get his MRI and he presents today to review that.  He had one injection that did help transiently.  I went back and reviewed his history.  He tells me he was doing great after the knee scope.  He did fine up until he went back to work.  He does not recall a specific injury but he does recall an incident where he was pulling a heavy load and he felt something \"squish like a ketchup packet\".    Allergies:   Allergies   Allergen Reactions   • Kiwi Extract Itching and Swelling   • Latex Itching     RASH       Home Medications:  No current facility-administered medications for this encounter.     Past Medical History:   Diagnosis Date   • Acute torn meniscus     RT KNEE   • Asthma    • Dysphagia    • GERD (gastroesophageal reflux disease)    • Hypertension    • Seasonal allergies    • Sleep apnea     C-PAP       Past Surgical History:   Procedure Laterality Date   • CATARACT EXTRACTION Bilateral 04/2017   • ESOPHAGEAL DILATATION     • HERNIA REPAIR  06/1988   • KNEE ARTHROSCOPY Right 1/15/2019    Procedure: Knee Arthroscopy with partial medial lateral Menisectomy;  Surgeon: Rodney Vega MD;  Location: [facility] OR [facility] [place];  Service: Orthopedics   • NASAL POLYP EXCISION  10/2016    SEPTOPLASTY       Social History     Occupational History   • Not on file   Tobacco Use   • Smoking status: Never Smoker   • Smokeless tobacco: Never Used   Substance and Sexual Activity   • Alcohol use: No   • Drug use: No   • Sexual activity: Not on file      Social History     Social History Narrative   • Not on file       Family History   Problem Relation Age of Onset   • Emphysema Father    • Malig Hyperthermia Neg Hx        Review of " Systems:      Constitutional: Denies fever, shaking or chills   Eyes: Denies change in visual acuity   HEENT: Denies nasal congestion or sore throat   Respiratory: Denies cough or shortness of breath   Cardiovascular: Denies chest pain or edema  Endocrine: Denies tremors, palpitations, intolerance of heat or cold, polyuria, polydipsia.  GI: Denies abdominal pain, nausea, vomiting, bloody stools or diarrhea  : Denies frequency, urgency, incontinence, retention, or nocturia.  Musculoskeletal: Denies numbness, tingling or loss of motor function except as above  Integument: Denies rash, lesion or ulceration   Neurologic: Denies headache or focal weakness, deficits  Heme: Denies spontaneous or excessive bleeding, epistaxis, hematuria, melena, fatigue, enlarged or tender lymph nodes.      All other pertinent positives and negatives as noted above in HPI.    Physical Exam: 54 y.o. male  There were no vitals filed for this visit.    General:  Patient is awake and alert.  Appears in no acute distress or discomfort.    Psych:  Affect and demeanor are appropriate.    Eyes:  Conjunctiva and sclera appear grossly normal.  Eyes track well and EOM seem to be intact.    Ears:  No gross abnormalities.  Hearing adequate for the exam.    Cardiovascular:  Regular rate and rhythm.    Lungs:  Good chest expansion.  Breathing unlabored.    Extremities: Right knee is examined.  Skin is benign.  No significant effusion.  Mild medial joint line tenderness.  Good motion.  He has discomfort with hyperflexion.  Positive medial Julianna's.  Good motor and sensory function throughout the leg and foot.  Brisk capillary refill.    Imaging: MRI right knee is reviewed along with the associated report.  He has what appears to be a complex recurrent medial meniscal tear.      Assessment/Plan: Recurrent right knee meniscal tear    I went back and reviewed his previous arthroscopy photos with him.  He had a complex tear that was debrided rather  extensively.  I am surprised that he has such an extensive recurrent tear.  Unfortunately, addressing this arthroscopically is going to require the removal of most if not all of the posterior horn and a section of the mid body as well.  He is not getting better.  The injection only provided transient relief.  He wants to pursue surgical options at this point.    We will plan on proceeding with a right knee arthroscopy and partial meniscectomy including possible chondroplasty and addressing any unforseen pathology as indicated.  I reviewed details of procedure with patient today and discussed all the risks, benefits, alternatives, and limitations of the procedure in laymen's terms with the risks including but not limited to:  neurovascular damage, bleeding, infection, hematoma, chronic pain, worsening of pain, chondrolysis, swelling, loss of motion, weakness, stiffness, instability, DVT, pulmonary embolus, death, stroke, complex regional pain syndrome, and need for additional procedures.  The patient verbalized understanding, and was given the opportunity to ask and have all questions answered today.  No guarantees were given regarding results of surgery.      Rodney Vega MD

## 2019-12-04 ENCOUNTER — TELEPHONE (OUTPATIENT)
Dept: ORTHOPEDIC SURGERY | Facility: CLINIC | Age: 54
End: 2019-12-04

## 2019-12-04 NOTE — TELEPHONE ENCOUNTER
Follow up postop call.  I spoke to Mr. Mcnair.  Reports pain is controlled, but is experiencing more pain compared to his last surgery.  I reminded him these first few days post op are usually the worse and his pain should gradually improve.  We discussed other postop care instructions.  I encouraged him to call us with any questions or concerns prior to his follow-up appointment next week.  He acknowledged understanding and appreciated the call.

## 2019-12-11 ENCOUNTER — OFFICE VISIT (OUTPATIENT)
Dept: ORTHOPEDIC SURGERY | Facility: CLINIC | Age: 54
End: 2019-12-11

## 2019-12-11 ENCOUNTER — TELEPHONE (OUTPATIENT)
Dept: ORTHOPEDIC SURGERY | Facility: CLINIC | Age: 54
End: 2019-12-11

## 2019-12-11 VITALS — BODY MASS INDEX: 41.72 KG/M2 | TEMPERATURE: 98.3 F | HEIGHT: 72 IN | WEIGHT: 308 LBS

## 2019-12-11 DIAGNOSIS — Z98.890 S/P ARTHROSCOPIC SURGERY OF RIGHT KNEE: Primary | ICD-10-CM

## 2019-12-11 PROCEDURE — 99024 POSTOP FOLLOW-UP VISIT: CPT | Performed by: ORTHOPAEDIC SURGERY

## 2019-12-11 RX ORDER — IBUPROFEN 800 MG/1
800 TABLET ORAL 3 TIMES DAILY
Qty: 90 TABLET | Refills: 0 | Status: SHIPPED | OUTPATIENT
Start: 2019-12-11 | End: 2021-04-26

## 2019-12-11 RX ORDER — HYDROCODONE BITARTRATE AND ACETAMINOPHEN 7.5; 325 MG/1; MG/1
1 TABLET ORAL EVERY 4 HOURS PRN
Qty: 50 TABLET | Refills: 0 | Status: SHIPPED | OUTPATIENT
Start: 2019-12-11 | End: 2020-06-08

## 2019-12-11 NOTE — PROGRESS NOTES
Maurizio Mcnair : 1965 MRN: [id number] DATE: 2019    CC: 1 week s/p right  knee scope     Vitals:    19 1459   Temp: 98.3 °F (36.8 °C)       HPI:  Pt. returns to clinic today for follow up.  Denies any wound problems including redness, drainage.  No fevers, chills, sweats.  This surgery was a little more painful than the first one.    Exam:  Wounds appear well-approximated without any erythema or drainage.  Calf soft.  Negative Janet's sign.  Good motor and sensory function in foot.  Good pedal pulses.  Gait mildly antalgic but otherwise reciprocal heel-toe.    Imaging   none    Impression:  1 week s/p right knee scope with revision meniscectomy    Plan:    1.  Sutures removed and replaced with steri-strips.  2.  Continue to progress activity gradually as tolerated.  3.  Continue to ice knee as needed, especially after activity.  4.  Follow up in 1 month.  5.  I refilled his Norco and also gave him a Rx for Ibuprofen.  Risks were discussed.    Rodney Vega MD  2019

## 2020-01-08 ENCOUNTER — OFFICE VISIT (OUTPATIENT)
Dept: ORTHOPEDIC SURGERY | Facility: CLINIC | Age: 55
End: 2020-01-08

## 2020-01-08 VITALS — BODY MASS INDEX: 41.83 KG/M2 | TEMPERATURE: 98.6 F | HEIGHT: 72 IN | WEIGHT: 308.8 LBS

## 2020-01-08 DIAGNOSIS — Z09 SURGERY FOLLOW-UP: Primary | ICD-10-CM

## 2020-01-08 PROCEDURE — 99024 POSTOP FOLLOW-UP VISIT: CPT | Performed by: ORTHOPAEDIC SURGERY

## 2020-01-08 NOTE — PROGRESS NOTES
Maurizio Mcnair : 1965 MRN: [id number] DATE: 2020    CC: 6 weeks s/p right knee scope with medial and lateral meniscectomies    Vitals:    20 1600   Temp: 98.6 °F (37 °C)     HPI: Pt. returns to clinic today for follow up.  Reports some soreness but knee is progressing well overall.  He still feels weak.  Denies any concerns or problems.    Exam:  Wounds appear well healed.  Calf soft.  Negative Janet's sign.  Full knee motion.  Good motor and sensory function in foot.  Good pedal pulses.  Gait appears normal.    Imaging   none    Impression:  6 weeks s/p right knee scope with medial and lateral meniscectomies    Plan:    He still feels weak and his job requires a lot of standing and walking.  He has difficulty bending, doing stairs, kneeling and squatting.  His legs are weak and he is not ready to go back to work full duty.  I am going to have him do a bit more PT and start work conditioning which I think could be very helpful for him. We will give it a month and then I'll see him back.  He will likely be at  [facility] at that point.

## 2020-01-27 ENCOUNTER — TELEPHONE (OUTPATIENT)
Dept: ORTHOPEDIC SURGERY | Facility: CLINIC | Age: 55
End: 2020-01-27

## 2020-02-06 ENCOUNTER — TELEPHONE (OUTPATIENT)
Dept: ORTHOPEDIC SURGERY | Facility: CLINIC | Age: 55
End: 2020-02-06

## 2020-02-06 NOTE — TELEPHONE ENCOUNTER
PAT IS SCHED TO SEE YOU 2-12 FOR HIS 2ND PO APT. SX WAS IN December. HAS NOT STARTED PT YET. WANTS TO MOVE HIP APT TO [place], IS THIS OK OR SHOULD HE KEEP THE APT NEXT WK FOR 2 MO PO CHECK

## 2020-02-13 ENCOUNTER — TELEPHONE (OUTPATIENT)
Dept: ORTHOPEDIC SURGERY | Facility: CLINIC | Age: 55
End: 2020-02-13

## 2020-02-13 DIAGNOSIS — Z98.890 S/P ARTHROSCOPIC SURGERY OF RIGHT KNEE: Primary | ICD-10-CM

## 2020-03-04 ENCOUNTER — OFFICE VISIT (OUTPATIENT)
Dept: ORTHOPEDIC SURGERY | Facility: CLINIC | Age: 55
End: 2020-03-04

## 2020-03-04 VITALS — HEIGHT: 72 IN | TEMPERATURE: 99 F | WEIGHT: 315 LBS | BODY MASS INDEX: 42.66 KG/M2

## 2020-03-04 DIAGNOSIS — Z09 SURGERY FOLLOW-UP: Primary | ICD-10-CM

## 2020-03-04 PROCEDURE — 99212 OFFICE O/P EST SF 10 MIN: CPT | Performed by: ORTHOPAEDIC SURGERY

## 2020-03-04 NOTE — PROGRESS NOTES
Chief complaint: Follow-up status post right knee partial medial and lateral meniscectomies    Mr. Mcnair follows up today for his right knee.  He has been going to therapy and it seems to have helped.  He feels like he is better but not 100%.  He still has discomfort when squatting.  He wants to try going back to work.  He is going to be working in a different section and he feels like he could probably tolerate this.    His incisions are healed.  Motion is full.  Just some mild medial tenderness.  Negative medial and lateral Julianna's test.  Gait is normal.    Assessment: 3-month status post right knee partial medial and lateral meniscectomies    Plan: He does have some known arthritis.  Down the road, he is likely to require some intervention for that.  For now, I will release him to go back to work without restriction.  We will consider him to be at [facility] as of today.

## 2020-03-10 ENCOUNTER — TELEPHONE (OUTPATIENT)
Dept: ORTHOPEDIC SURGERY | Facility: CLINIC | Age: 55
End: 2020-03-10

## 2020-06-08 ENCOUNTER — OFFICE VISIT (OUTPATIENT)
Dept: ORTHOPEDIC SURGERY | Facility: CLINIC | Age: 55
End: 2020-06-08

## 2020-06-08 VITALS — HEIGHT: 72 IN | WEIGHT: 294 LBS | BODY MASS INDEX: 39.82 KG/M2 | TEMPERATURE: 98.2 F

## 2020-06-08 DIAGNOSIS — M17.10 ARTHRITIS OF KNEE: Primary | ICD-10-CM

## 2020-06-08 PROCEDURE — 99212 OFFICE O/P EST SF 10 MIN: CPT | Performed by: ORTHOPAEDIC SURGERY

## 2020-06-08 PROCEDURE — 20610 DRAIN/INJ JOINT/BURSA W/O US: CPT | Performed by: ORTHOPAEDIC SURGERY

## 2020-06-08 RX ORDER — METHYLPREDNISOLONE ACETATE 80 MG/ML
160 INJECTION, SUSPENSION INTRA-ARTICULAR; INTRALESIONAL; INTRAMUSCULAR; SOFT TISSUE
Status: COMPLETED | OUTPATIENT
Start: 2020-06-08 | End: 2020-06-08

## 2020-06-08 RX ORDER — IBUPROFEN 800 MG/1
800 TABLET ORAL 3 TIMES DAILY
Qty: 90 TABLET | Refills: 1 | Status: SHIPPED | OUTPATIENT
Start: 2020-06-08 | End: 2021-04-26

## 2020-06-08 RX ADMIN — METHYLPREDNISOLONE ACETATE 160 MG: 80 INJECTION, SUSPENSION INTRA-ARTICULAR; INTRALESIONAL; INTRAMUSCULAR; SOFT TISSUE at 15:19

## 2020-06-08 NOTE — PROGRESS NOTES
Chief Complaint: Right knee pain    HPI:  Mr. Mcnair follows up for his right knee.  He still has good days and bad days.  He reports moderate constant aching discomfort.  He comes in to discuss further options.    Exam: Right knee is examined.  Portals are healed.  No effusion.  Mild to moderate medial tenderness.  Good motion.  Gait is normal.    Imaging:  none    Assessment:  Right knee osteoarthritis s/p arthroscopic meniscectomy    Plan: We discussed his options.  I think the best option at this point is to try another cortisone injection.  The risk, benefits and alternatives were discussed but he consented and the injection was performed as described below.  He will follow-up as needed.    Large Joint Arthrocentesis: R knee  Date/Time: 6/8/2020 3:19 PM  Consent given by: patient  Site marked: site marked  Timeout: Immediately prior to procedure a time out was called to verify the correct patient, procedure, equipment, support staff and site/side marked as required   Supporting Documentation  Indications: pain and joint swelling   Procedure Details  Location: knee - R knee  Preparation: Patient was prepped and draped in the usual sterile fashion  Needle gauge: 21 G.  Approach: anterolateral  Medications administered: 160 mg methylPREDNISolone acetate 80 MG/ML; 2 mL lidocaine (cardiac)  Patient tolerance: patient tolerated the procedure well with no immediate complications        Rodney Vega MD  06/08/2020

## 2020-11-16 ENCOUNTER — OFFICE VISIT (OUTPATIENT)
Dept: ORTHOPEDIC SURGERY | Facility: CLINIC | Age: 55
End: 2020-11-16

## 2020-11-16 VITALS — BODY MASS INDEX: 42.98 KG/M2 | WEIGHT: 307 LBS | TEMPERATURE: 98.3 F | HEIGHT: 71 IN

## 2020-11-16 DIAGNOSIS — S89.91XA RIGHT KNEE INJURY, INITIAL ENCOUNTER: ICD-10-CM

## 2020-11-16 DIAGNOSIS — R52 PAIN: Primary | ICD-10-CM

## 2020-11-16 PROCEDURE — 20610 DRAIN/INJ JOINT/BURSA W/O US: CPT | Performed by: ORTHOPAEDIC SURGERY

## 2020-11-16 PROCEDURE — 99213 OFFICE O/P EST LOW 20 MIN: CPT | Performed by: ORTHOPAEDIC SURGERY

## 2020-11-16 PROCEDURE — 73562 X-RAY EXAM OF KNEE 3: CPT | Performed by: ORTHOPAEDIC SURGERY

## 2020-11-16 RX ORDER — METHYLPREDNISOLONE ACETATE 80 MG/ML
80 INJECTION, SUSPENSION INTRA-ARTICULAR; INTRALESIONAL; INTRAMUSCULAR; SOFT TISSUE
Status: COMPLETED | OUTPATIENT
Start: 2020-11-16 | End: 2020-11-16

## 2020-11-16 RX ADMIN — METHYLPREDNISOLONE ACETATE 80 MG: 80 INJECTION, SUSPENSION INTRA-ARTICULAR; INTRALESIONAL; INTRAMUSCULAR; SOFT TISSUE at 12:51

## 2020-11-16 NOTE — PROGRESS NOTES
"Patient:Maurizio Mcnair    YOB: 1965    Medical Record Number:[id number]    Chief Complaints: New right knee injury    History of Present Illness:     55 y.o. male patient who presents for a new injury to his right knee.  He was at work when he stepped on a \"dock plate\".  He felt a sharp, searing pain in his knee.  Symptoms were identical to those that he has experienced in the past with his meniscal tears.  He tells me the injection that we did in June helped tremendously and he was doing great until this work incident which occurred on October 26.  Pain varies from mild to severe.  Typical pain is intermittent and stabbing.  Denies any mechanical catching or locking.    Allergies:  Allergies   Allergen Reactions   • Kiwi Extract Itching and Swelling   • Latex Itching     RASH       Home Medications:    Current Outpatient Medications:   •  ibuprofen (ADVIL,MOTRIN) 800 MG tablet, Take 1 tablet by mouth 3 (Three) Times a Day., Disp: 90 tablet, Rfl: 0  •  ibuprofen (ADVIL,MOTRIN) 800 MG tablet, Take 1 tablet by mouth 3 (Three) Times a Day., Disp: 90 tablet, Rfl: 1  •  lisinopril-hydrochlorothiazide (PRINZIDE,ZESTORETIC) 10-12.5 MG per tablet, Take 1 tablet by mouth Every Morning., Disp: , Rfl:   •  loratadine (CLARITIN) 10 MG tablet, Take 10 mg by mouth As Needed., Disp: , Rfl:   •  omeprazole (priLOSEC) 40 MG capsule, Take 40 mg by mouth Every Morning., Disp: , Rfl:     Past Medical History:   Diagnosis Date   • Acute torn meniscus     RT KNEE   • Asthma    • Dysphagia    • GERD (gastroesophageal reflux disease)    • Hypertension    • Seasonal allergies    • Sleep apnea     C-PAP       Past Surgical History:   Procedure Laterality Date   • CATARACT EXTRACTION Bilateral 04/2017   • ESOPHAGEAL DILATATION     • HERNIA REPAIR  06/1988   • KNEE ARTHROSCOPY Right 1/15/2019    Procedure: Knee Arthroscopy with partial medial lateral Menisectomy;  Surgeon: Rodney Vega MD;  [place] [facility] [place] [facility] [place]  " "Service: Orthopedics   • KNEE ARTHROSCOPY Right 12/3/2019    Procedure: Knee Arthroscopy with Menisectomy;  Surgeon: Rodney Vega MD;  [place] [facility] [place] [facility] [place]  [place]: [place]ics   • NASAL POLYP EXCISION  10/2016    SEPTOPLASTY       Social History     Occupational History   • Not on file   Tobacco Use   • Smoking status: Never Smoker   • Smokeless tobacco: Never Used   Substance and Sexual Activity   • Alcohol use: No   • Drug use: No   • Sexual activity: Defer      Social History     Social History Narrative   • Not on file       Family History   Problem Relation Age of Onset   • Emphysema Father    • Malig Hyperthermia Neg Hx        Review of Systems:      Constitutional: Denies fever, shaking or chills   Eyes: Denies change in visual acuity   HEENT: Denies nasal congestion or sore throat   Respiratory: Denies cough or shortness of breath   Cardiovascular: Denies chest pain or edema  Endocrine: Denies tremors, palpitations, intolerance of heat or cold, polyuria, polydipsia.  GI: Denies abdominal pain, nausea, vomiting, bloody stools or diarrhea  : Denies frequency, urgency, incontinence, retention, or nocturia.  Musculoskeletal: Denies numbness, tingling or loss of motor function except as above  Integument: Denies rash, lesion or ulceration   Neurologic: Denies headache or focal weakness, deficits  Heme: Denies spontaneous or excessive bleeding, epistaxis, hematuria, melena, fatigue, enlarged or tender lymph nodes.      All other pertinent positives and negatives as noted above in HPI.    Physical Exam:55 y.o. male  Vitals:    11/16/20 1051   Temp: 98.3 °F (36.8 °C)   Weight: (!) 139 kg (307 lb)   Height: 180.3 cm (71\")       General:  Patient is awake and alert.  Appears in no acute distress or discomfort.    Psych:  Affect and demeanor are appropriate.    Extremities: Right knee is examined.  He has a trace effusion.  Moderate medial joint line tenderness.  Good motion.  No instability.  Medial " Julianna's is painful.  Normal motor and sensory function throughout the leg and foot.  Brisk capillary refill.    Imaging: AP, merchant's and lateral views of the right knee are ordered, reviewed, and compared to previous x-rays.  He has medial compartment joint space narrowing.  This appears relatively stable compared to previous films.    Assessment/Plan: Right knee osteoarthritis, possible recurrent meniscal tear    We discussed his options.  I recommend that we try an injection but we may have to consider further work-up with an MRI if his symptoms persist.  The risk, benefits and alternatives to the injection were discussed but he consented and the injection was performed as described below.  I will see him back in 1 month to check his progress.  I am going to let him go back to work with restrictions.    Rodney Vega MD    11/16/2020    Large Joint Arthrocentesis: R knee  Date/Time: 11/16/2020 12:51 PM  Consent given by: patient  Site marked: site marked  Timeout: Immediately prior to procedure a time out was called to verify the correct patient, procedure, equipment, support staff and site/side marked as required   Supporting Documentation  Indications: pain and joint swelling   Procedure Details  Location: knee - R knee  Preparation: Patient was prepped and draped in the usual sterile fashion  Needle gauge: 21 G.  Approach: anterolateral  Medications administered: 80 mg methylPREDNISolone acetate 80 MG/ML; 2 mL lidocaine (cardiac)  Patient tolerance: patient tolerated the procedure well with no immediate complications

## 2020-12-07 ENCOUNTER — OFFICE VISIT (OUTPATIENT)
Dept: ORTHOPEDIC SURGERY | Facility: CLINIC | Age: 55
End: 2020-12-07

## 2020-12-07 VITALS — TEMPERATURE: 98 F | WEIGHT: 307 LBS | HEIGHT: 72 IN | BODY MASS INDEX: 41.58 KG/M2

## 2020-12-07 DIAGNOSIS — M17.10 ARTHRITIS OF KNEE: Primary | ICD-10-CM

## 2020-12-07 DIAGNOSIS — G89.29 CHRONIC PAIN OF RIGHT KNEE: ICD-10-CM

## 2020-12-07 DIAGNOSIS — M25.561 CHRONIC PAIN OF RIGHT KNEE: ICD-10-CM

## 2020-12-07 PROCEDURE — 99213 OFFICE O/P EST LOW 20 MIN: CPT | Performed by: ORTHOPAEDIC SURGERY

## 2020-12-07 RX ORDER — LISINOPRIL 10 MG/1
10 TABLET ORAL DAILY
COMMUNITY
Start: 2020-11-21

## 2020-12-07 RX ORDER — HYDROCHLOROTHIAZIDE 12.5 MG/1
12.5 TABLET ORAL DAILY
COMMUNITY
Start: 2020-11-21

## 2020-12-07 NOTE — PROGRESS NOTES
Patient: Maurizio Mcnair    YOB: 1965    Medical Record Number: [id number]    Chief Complaints: Follow-up regarding right knee pain    History of Present Illness:     55 y.o. male patient who presents for follow-up of the right knee.  Patient reports persistent pain and intermittent catching.  The injection did not help.  He says that his knee feels identical to when he has been diagnosed with tears in the past.  He is frustrated and wants to pursue further options.    Allergies:   Allergies   Allergen Reactions   • Kiwi Extract Itching and Swelling   • Latex Itching     RASH       Home Medications:    Current Outpatient Medications:   •  hydroCHLOROthiazide (HYDRODIURIL) 12.5 MG tablet, Take 12.5 mg by mouth Daily., Disp: , Rfl:   •  ibuprofen (ADVIL,MOTRIN) 800 MG tablet, Take 1 tablet by mouth 3 (Three) Times a Day., Disp: 90 tablet, Rfl: 1  •  lisinopril (PRINIVIL,ZESTRIL) 10 MG tablet, Take 10 mg by mouth Daily., Disp: , Rfl:   •  omeprazole (priLOSEC) 40 MG capsule, Take 40 mg by mouth 2 (two) times a day., Disp: , Rfl:   •  ibuprofen (ADVIL,MOTRIN) 800 MG tablet, Take 1 tablet by mouth 3 (Three) Times a Day., Disp: 90 tablet, Rfl: 0  •  lisinopril-hydrochlorothiazide (PRINZIDE,ZESTORETIC) 10-12.5 MG per tablet, Take 1 tablet by mouth Every Morning., Disp: , Rfl:   •  loratadine (CLARITIN) 10 MG tablet, Take 10 mg by mouth As Needed., Disp: , Rfl:     Past Medical History:   Diagnosis Date   • Acute torn meniscus     RT KNEE   • Asthma    • Dysphagia    • GERD (gastroesophageal reflux disease)    • Hypertension    • Seasonal allergies    • Sleep apnea     C-PAP       Past Surgical History:   Procedure Laterality Date   • CATARACT EXTRACTION Bilateral 04/2017   • ESOPHAGEAL DILATATION     • HERNIA REPAIR  06/1988   • KNEE ARTHROSCOPY Right 1/15/2019    Procedure: Knee Arthroscopy with partial medial lateral Menisectomy;  Surgeon: Rodney Vega MD;  Location: [facility] OR [facility] [place];  Service:  "Orthopedics   • KNEE ARTHROSCOPY Right 12/3/2019    Procedure: Knee Arthroscopy with Menisectomy;  Surgeon: Rodney Vega MD;  Location: [facility] OR [facility] [place];  Service: Orthopedics   • NASAL POLYP EXCISION  10/2016    SEPTOPLASTY       Social History     Occupational History   • Not on file   Tobacco Use   • Smoking status: Never Smoker   • Smokeless tobacco: Never Used   Substance and Sexual Activity   • Alcohol use: No   • Drug use: No   • Sexual activity: Defer      Social History     Social History Narrative   • Not on file       Family History   Problem Relation Age of Onset   • Emphysema Father    • Malig Hyperthermia Neg Hx        Review of Systems:      Constitutional: Denies fever, shaking or chills   Eyes: Denies change in visual acuity   HEENT: Denies nasal congestion or sore throat   Respiratory: Denies cough or shortness of breath   Cardiovascular: Denies chest pain or edema  Endocrine: Denies tremors, palpitations, intolerance of heat or cold, polyuria, polydipsia.  GI: Denies abdominal pain, nausea, vomiting, bloody stools or diarrhea  : Denies frequency, urgency, incontinence, retention, or nocturia.  Musculoskeletal: Denies numbness, tingling or loss of motor function except as above  Integument: Denies rash, lesion or ulceration   Neurologic: Denies headache or focal weakness, deficits  Heme: Denies spontaneous or excessive bleeding, epistaxis, hematuria, melena, fatigue, enlarged or tender lymph nodes.      All other pertinent positives and negatives as noted above in HPI.    Physical Exam:   55 y.o. male  Vitals:    12/07/20 1532   Temp: 98 °F (36.7 °C)   TempSrc: Temporal   Weight: (!) 139 kg (307 lb)   Height: 182.9 cm (72\")     General:  Patient is awake and alert.  Appears in no acute distress or discomfort.    Psych:  Affect and demeanor are appropriate.    Eyes:  Conjunctiva and sclera appear grossly normal.  Eyes track well and EOM seem to be intact.    Ears:  No gross abnormalities.  " Hearing adequate for the exam.    Cardiovascular:  Regular rate and rhythm.    Lungs:  Good chest expansion.  Breathing unlabored.    Extremities:  Right knee is examined.  Skin is benign.  Moderate medial joint line tenderness.  Positive medial Julianna's for pain..  Normal motor and sensory function in the lower leg and foot.  Palpable pedal pulses.  Brisk capillary refill.    Imaging: None taken today.      Assessment/Plan:  Right knee osteoarthritis, possible new meniscal tear    His x-rays do show some medial compartment narrowing which is to be expected given his surgical history.  He definitely has some early arthritis but he may have a new meniscal tear.  We discussed his options at this point.  He wants to move forward with a new MRI.  I consider that is reasonable given his failure of conservative treatment thus far.  I told him that I will review the MRI and then call with the results.  We will come up with a plan pending review of that study.    Rodney Vega MD    12/07/2020

## 2020-12-10 ENCOUNTER — TELEPHONE (OUTPATIENT)
Dept: ORTHOPEDIC SURGERY | Facility: CLINIC | Age: 55
End: 2020-12-10

## 2020-12-10 NOTE — TELEPHONE ENCOUNTER
PATIENT RETURNED MY CALL I GAVE HIM THE INFO THAT HE WAS GOING TO [facility] TODAY FOR HIS MRI,LLR

## 2020-12-11 ENCOUNTER — TELEPHONE (OUTPATIENT)
Dept: ORTHOPEDIC SURGERY | Facility: CLINIC | Age: 55
End: 2020-12-11

## 2020-12-11 NOTE — TELEPHONE ENCOUNTER
I spoke to Mr. Mcnair.  I provided him with the right knee MRI results.  He has arthritis, but no meniscal tear.  He tells me he got no relief from the last injection.  I recommend he follow-up in the office with Dr. Vega for reevaluation and discuss other treatment options.  He agreed.  I will have our office call him to schedule this appointment.

## 2020-12-16 ENCOUNTER — OFFICE VISIT (OUTPATIENT)
Dept: ORTHOPEDIC SURGERY | Facility: CLINIC | Age: 55
End: 2020-12-16

## 2020-12-16 VITALS — WEIGHT: 307 LBS | TEMPERATURE: 97 F | BODY MASS INDEX: 42.98 KG/M2 | HEIGHT: 71 IN

## 2020-12-16 DIAGNOSIS — G89.29 CHRONIC PAIN OF RIGHT KNEE: Primary | ICD-10-CM

## 2020-12-16 DIAGNOSIS — M25.561 CHRONIC PAIN OF RIGHT KNEE: Primary | ICD-10-CM

## 2020-12-16 PROCEDURE — 99213 OFFICE O/P EST LOW 20 MIN: CPT | Performed by: ORTHOPAEDIC SURGERY

## 2020-12-16 NOTE — PROGRESS NOTES
Patient: Maurizio Mcnair    YOB: 1965    Medical Record Number: [id number]    Chief Complaints: Follow-up regarding right knee pain    History of Present Illness:     55 y.o. male patient who presents for follow-up of the right knee.  Patient reports persistent pain and symptoms.  Conservative treatment has been ineffective thus far.  He recently got an MRI and presents today to review that study and discuss further options.    Allergies:   Allergies   Allergen Reactions   • Kiwi Extract Itching and Swelling   • Latex Itching     RASH       Home Medications:    Current Outpatient Medications:   •  hydroCHLOROthiazide (HYDRODIURIL) 12.5 MG tablet, Take 12.5 mg by mouth Daily., Disp: , Rfl:   •  ibuprofen (ADVIL,MOTRIN) 800 MG tablet, Take 1 tablet by mouth 3 (Three) Times a Day., Disp: 90 tablet, Rfl: 0  •  ibuprofen (ADVIL,MOTRIN) 800 MG tablet, Take 1 tablet by mouth 3 (Three) Times a Day., Disp: 90 tablet, Rfl: 1  •  lisinopril (PRINIVIL,ZESTRIL) 10 MG tablet, Take 10 mg by mouth Daily., Disp: , Rfl:   •  lisinopril-hydrochlorothiazide (PRINZIDE,ZESTORETIC) 10-12.5 MG per tablet, Take 1 tablet by mouth Every Morning., Disp: , Rfl:   •  loratadine (CLARITIN) 10 MG tablet, Take 10 mg by mouth As Needed., Disp: , Rfl:   •  omeprazole (priLOSEC) 40 MG capsule, Take 40 mg by mouth 2 (two) times a day., Disp: , Rfl:     Past Medical History:   Diagnosis Date   • Acute torn meniscus     RT KNEE   • Asthma    • Dysphagia    • GERD (gastroesophageal reflux disease)    • Hypertension    • Seasonal allergies    • Sleep apnea     C-PAP       Past Surgical History:   Procedure Laterality Date   • CATARACT EXTRACTION Bilateral 04/2017   • ESOPHAGEAL DILATATION     • HERNIA REPAIR  06/1988   • KNEE ARTHROSCOPY Right 1/15/2019    Procedure: Knee Arthroscopy with partial medial lateral Menisectomy;  Surgeon: Rodney Vega MD;  Location: [facility] OR [facility] [place];  Service: Orthopedics   • KNEE ARTHROSCOPY Right  "12/3/2019    Procedure: Knee Arthroscopy with Menisectomy;  Surgeon: Rodney Vega MD;  [place] [facility] [place] [facility] [place]: [place]ics   • NASAL POLYP EXCISION  10/2016    SEPTOPLASTY       Social History     Occupational History   • Not on file   Tobacco Use   • Smoking status: Never Smoker   • Smokeless tobacco: Never Used   Substance and Sexual Activity   • Alcohol use: No   • Drug use: No   • Sexual activity: Defer      Social History     Social History Narrative   • Not on file       Family History   Problem Relation Age of Onset   • Emphysema Father    • Malig Hyperthermia Neg Hx        Review of Systems:      Constitutional: Denies fever, shaking or chills   Eyes: Denies change in visual acuity   HEENT: Denies nasal congestion or sore throat   Respiratory: Denies cough or shortness of breath   Cardiovascular: Denies chest pain or edema  Endocrine: Denies tremors, palpitations, intolerance of heat or cold, polyuria, polydipsia.  GI: Denies abdominal pain, nausea, vomiting, bloody stools or diarrhea  : Denies frequency, urgency, incontinence, retention, or nocturia.  Musculoskeletal: Denies numbness, tingling or loss of motor function except as above  Integument: Denies rash, lesion or ulceration   Neurologic: Denies headache or focal weakness, deficits  Heme: Denies spontaneous or excessive bleeding, epistaxis, hematuria, melena, fatigue, enlarged or tender lymph nodes.      All other pertinent positives and negatives as noted above in HPI.    Physical Exam:  55 y.o. male  Vitals:    12/16/20 1113   Temp: 97 °F (36.1 °C)   TempSrc: Temporal   Weight: (!) 139 kg (307 lb)   Height: 180.3 cm (71\")     General:  Patient is awake and alert.  Appears in no acute distress or discomfort.    Psych:  Affect and demeanor are appropriate.    Eyes:  Conjunctiva and sclera appear grossly normal.  Eyes track well and EOM seem to be intact.    Ears:  No gross abnormalities.  Hearing adequate for the " exam.    Cardiovascular:  Regular rate and rhythm.    Lungs:  Good chest expansion.  Breathing unlabored.    Extremities:  Right knee is examined.  Skin is benign.  Moderate medial tenderness.  Positive medial Julianna's for pain.  Hyperflexion is uncomfortable.  Motion is good overall..  Normal motor and sensory function in the lower leg and foot.  Palpable pedal pulses.  Brisk capillary refill.    Imaging:  MRI of the right knee is reviewed along with the associated report.  I do not see a recurrent meniscal tear.  In the notch, there is a fragment anterior to the PCL.  In going back and looking at his previous MRI from 2019, on one of the sagittal cuts, I can identify the same fragment.  That is the only questionable meniscal tear that I see.  There is some signal in the anterior horns of both menisci but I do not think it is a clear an obvious tear.  What is clear is that he has significant arthritis.  There is edema in the bone and areas of full-thickness chondral loss which have progressed relative to his previous MRI from 2019.  The radiologist who read his recent MRI did not have the comparison MRI.  To me, it appears there has been significant progression of his arthritic change.  He also now has extrusion of the lateral meniscus and some subluxation of the tibia.    Assessment/Plan:  Right knee osteoarthritis with subchondral marrow edema and questionable recurrent meniscal tear    I do not see any obvious large or unstable meniscal fragment which would account for any mechanical symptoms that he is experiencing.  Overall what jumps out of me from his MRI is the progression of his arthritis and the edema in the subchondral bone.  Unfortunately, I think he is headed towards an arthroplasty.  I think it is unlikely that any arthroscopic intervention could predictably improve his situation at this point.  I recommend that we try continuing to manage him conservatively for now.  We discussed all available  conservative treatment options for him.  He just had a cortisone injection fairly recently.  He would like to try Visco.  I will get that ordered for him and see if we can get that approved.  I did agree to also refer him to therapy.  I will see him back for the Visco injection.      Rodney Vega MD    12/16/2020

## 2021-01-04 ENCOUNTER — TELEPHONE (OUTPATIENT)
Dept: ORTHOPEDICS | Facility: OTHER | Age: 56
End: 2021-01-04

## 2021-01-06 ENCOUNTER — CLINICAL SUPPORT (OUTPATIENT)
Dept: ORTHOPEDIC SURGERY | Facility: CLINIC | Age: 56
End: 2021-01-06

## 2021-01-06 VITALS — BODY MASS INDEX: 41.45 KG/M2 | HEIGHT: 72 IN | WEIGHT: 306 LBS | TEMPERATURE: 97.2 F

## 2021-01-06 DIAGNOSIS — G89.29 CHRONIC PAIN OF RIGHT KNEE: ICD-10-CM

## 2021-01-06 DIAGNOSIS — M17.10 ARTHRITIS OF KNEE: Primary | ICD-10-CM

## 2021-01-06 DIAGNOSIS — M25.561 CHRONIC PAIN OF RIGHT KNEE: ICD-10-CM

## 2021-01-06 PROCEDURE — 99214 OFFICE O/P EST MOD 30 MIN: CPT | Performed by: ORTHOPAEDIC SURGERY

## 2021-01-06 PROCEDURE — 20610 DRAIN/INJ JOINT/BURSA W/O US: CPT | Performed by: ORTHOPAEDIC SURGERY

## 2021-01-06 RX ORDER — MELOXICAM 15 MG/1
15 TABLET ORAL DAILY PRN
Qty: 30 TABLET | Refills: 2 | Status: SHIPPED | OUTPATIENT
Start: 2021-01-06 | End: 2021-07-28

## 2021-01-06 NOTE — PROGRESS NOTES
Patient: Maurizio Mcnair    YOB: 1965    Medical Record Number: [id number]    Chief Complaints: Follow-up regarding right knee pain    History of Present Illness:     55 y.o. male patient who presents for follow-up of the right knee.  Patient reports worsening pain and symptoms.  He recently got an MRI and presents today to review that study and discuss further options.    Allergies:   Allergies   Allergen Reactions   • Kiwi Extract Itching and Swelling   • Latex Itching     RASH       Home Medications:    Current Outpatient Medications:   •  hydroCHLOROthiazide (HYDRODIURIL) 12.5 MG tablet, Take 12.5 mg by mouth Daily., Disp: , Rfl:   •  ibuprofen (ADVIL,MOTRIN) 800 MG tablet, Take 1 tablet by mouth 3 (Three) Times a Day., Disp: 90 tablet, Rfl: 0  •  ibuprofen (ADVIL,MOTRIN) 800 MG tablet, Take 1 tablet by mouth 3 (Three) Times a Day., Disp: 90 tablet, Rfl: 1  •  lisinopril (PRINIVIL,ZESTRIL) 10 MG tablet, Take 10 mg by mouth Daily., Disp: , Rfl:   •  lisinopril-hydrochlorothiazide (PRINZIDE,ZESTORETIC) 10-12.5 MG per tablet, Take 1 tablet by mouth Every Morning., Disp: , Rfl:   •  loratadine (CLARITIN) 10 MG tablet, Take 10 mg by mouth As Needed., Disp: , Rfl:   •  omeprazole (priLOSEC) 40 MG capsule, Take 40 mg by mouth 2 (two) times a day., Disp: , Rfl:     Past Medical History:   Diagnosis Date   • Acute torn meniscus     RT KNEE   • Asthma    • Dysphagia    • GERD (gastroesophageal reflux disease)    • Hypertension    • Seasonal allergies    • Sleep apnea     C-PAP       Past Surgical History:   Procedure Laterality Date   • CATARACT EXTRACTION Bilateral 04/2017   • ESOPHAGEAL DILATATION     • HERNIA REPAIR  06/1988   • KNEE ARTHROSCOPY Right 1/15/2019    Procedure: Knee Arthroscopy with partial medial lateral Menisectomy;  Surgeon: Rodney Vega MD;  [place] [facility] [place] [facility] [place]  Service: [place]opedics   • KNEE ARTHROSCOPY Right 12/3/2019    Procedure: Knee Arthroscopy with Menisectomy;   "Surgeon: Rodney Vega MD;  [place] [facility] [place] [facility] [place]: [place]   • NASAL POLYP EXCISION  10/2016    SEPTOPLASTY       Social History     Occupational History   • Not on file   Tobacco Use   • Smoking status: Never Smoker   • Smokeless tobacco: Never Used   Substance and Sexual Activity   • Alcohol use: No   • Drug use: No   • Sexual activity: Defer      Social History     Social History Narrative   • Not on file       Family History   Problem Relation Age of Onset   • Emphysema Father    • Malig Hyperthermia Neg Hx        Review of Systems:      Constitutional: Denies fever, shaking or chills   Eyes: Denies change in visual acuity   HEENT: Denies nasal congestion or sore throat   Respiratory: Denies cough or shortness of breath   Cardiovascular: Denies chest pain or edema  Endocrine: Denies tremors, palpitations, intolerance of heat or cold, polyuria, polydipsia.  GI: Denies abdominal pain, nausea, vomiting, bloody stools or diarrhea  : Denies frequency, urgency, incontinence, retention, or nocturia.  Musculoskeletal: Denies numbness, tingling or loss of motor function except as above  Integument: Denies rash, lesion or ulceration   Neurologic: Denies headache or focal weakness, deficits  Heme: Denies spontaneous or excessive bleeding, epistaxis, hematuria, melena, fatigue, enlarged or tender lymph nodes.      All other pertinent positives and negatives as noted above in HPI.    Physical Exam:  55 y.o. male  Vitals:    01/06/21 0815   Temp: 97.2 °F (36.2 °C)   Weight: (!) 139 kg (306 lb)   Height: 182.9 cm (72\")     General:  Patient is awake and alert.  Appears in no acute distress or discomfort.    Psych:  Affect and demeanor are appropriate.    Eyes:  Conjunctiva and sclera appear grossly normal.  Eyes track well and EOM seem to be intact.    Ears:  No gross abnormalities.  Hearing adequate for the exam.    Cardiovascular:  Regular rate and rhythm.    Lungs:  Good chest expansion.  Breathing " unlabored.    Extremities:  Right knee is examined.  Skin is benign.  His previous portals are healed.  He has a trace effusion.  No erythema or increased warmth.  Moderate medial and mild lateral tenderness.  Motion is good.  No instability.  Medial and lateral Julianna's are both uncomfortable but neither is frankly positive today..  Normal motor and sensory function in the lower leg and foot.  Palpable pedal pulses.  Brisk capillary refill.    Imaging:  MRI of the right knee is reviewed along with the associated report.  He has progressive medial compartment osteoarthritis.  There is marrow edema consistent with subchondral stress fracture and stress reaction in the tibia and the medial femoral condyle.  There is no clear recurrent meniscal tear.  He does have extrusion of both the medial and lateral menisci.    Assessment/Plan:  Right knee osteoarthritis with worsening pain and dysfunction    We reviewed the MRI together.  I do not see anything on his MRI that I feel I could predictably improve with further arthroscopic intervention including a subchondroplasty.  There is no clear recurrent meniscal tear.  He does have progressive arthrosis and subluxation of the tibia.  He is headed towards a total knee.  This was discussed in detail.  We discussed all of his options, both surgical and nonsurgical.  He would prefer to continue nonsurgical treatment for now.  He says that ibuprofen helps but it does upset his stomach.  I agreed to switch him to meloxicam.  Risk of this medicine were discussed and I called in a prescription for him.  I recommended physical therapy and he was given a referral for this as well.  Last, we discussed further injections.  Cortisone has not been all that effective for him so he would like to try Visco.  The risk, benefits and alternatives were discussed.  He consented and the Visco injection was performed as described below.  He will follow-up with me in 2 months.    Rodney Vega,  MD    01/06/2021    Large Joint Arthrocentesis: R knee  Date/Time: 1/6/2021 8:04 AM  Consent given by: patient  Site marked: site marked  Timeout: Immediately prior to procedure a time out was called to verify the correct patient, procedure, equipment, support staff and site/side marked as required   Supporting Documentation  Indications: pain   Procedure Details  Location: knee - R knee  Preparation: Patient was prepped and draped in the usual sterile fashion  Needle gauge: 21G.  Approach: anterolateral  Medications administered: 88 mg Hyaluronan 88 MG/4ML; 4 mL lidocaine (cardiac)  Patient tolerance: patient tolerated the procedure well with no immediate complications

## 2021-01-21 ENCOUNTER — TELEPHONE (OUTPATIENT)
Dept: PHYSICAL THERAPY | Facility: CLINIC | Age: 56
End: 2021-01-21

## 2021-01-25 ENCOUNTER — OFFICE VISIT (OUTPATIENT)
Dept: ORTHOPEDIC SURGERY | Facility: CLINIC | Age: 56
End: 2021-01-25

## 2021-01-25 ENCOUNTER — TREATMENT (OUTPATIENT)
Dept: PHYSICAL THERAPY | Facility: CLINIC | Age: 56
End: 2021-01-25

## 2021-01-25 VITALS — HEIGHT: 72 IN | BODY MASS INDEX: 41.31 KG/M2 | TEMPERATURE: 97.7 F | WEIGHT: 305 LBS

## 2021-01-25 DIAGNOSIS — M75.31 CALCIFIC TENDINITIS OF RIGHT SHOULDER: ICD-10-CM

## 2021-01-25 DIAGNOSIS — S83.209D: ICD-10-CM

## 2021-01-25 DIAGNOSIS — G89.29 CHRONIC PAIN OF RIGHT KNEE: Primary | ICD-10-CM

## 2021-01-25 DIAGNOSIS — M25.561 CHRONIC PAIN OF RIGHT KNEE: Primary | ICD-10-CM

## 2021-01-25 DIAGNOSIS — M25.511 ACUTE PAIN OF RIGHT SHOULDER: Primary | ICD-10-CM

## 2021-01-25 PROCEDURE — 20610 DRAIN/INJ JOINT/BURSA W/O US: CPT | Performed by: NURSE PRACTITIONER

## 2021-01-25 PROCEDURE — 97530 THERAPEUTIC ACTIVITIES: CPT | Performed by: PHYSICAL THERAPIST

## 2021-01-25 PROCEDURE — 99213 OFFICE O/P EST LOW 20 MIN: CPT | Performed by: NURSE PRACTITIONER

## 2021-01-25 PROCEDURE — 97161 PT EVAL LOW COMPLEX 20 MIN: CPT | Performed by: PHYSICAL THERAPIST

## 2021-01-25 PROCEDURE — 73030 X-RAY EXAM OF SHOULDER: CPT | Performed by: NURSE PRACTITIONER

## 2021-01-25 RX ADMIN — METHYLPREDNISOLONE ACETATE 80 MG: 80 INJECTION, SUSPENSION INTRA-ARTICULAR; INTRALESIONAL; INTRAMUSCULAR; SOFT TISSUE at 15:59

## 2021-01-25 NOTE — PROGRESS NOTES
"  Patient: Maurizio Mcnair    YOB: 1965    Medical Record Number: [id number]    Chief Complaints:  Right shoulder pain    History of Present Illness:     55 y.o. male patient who presents with a complaint of right shoulder pain and weakness.  He reports that the symptoms first started 4 days ago.    Denies prior injury or history of right shoulder pain.  He says, \"I reached up to point to the ceiling and felt immediate pain\".  Reports he experienced brief numbness in his arm the day his symptoms began, but this fully resolved by the following day.  Reports 2 days later, he began to experience increased pain and weakness.  Pain is localized to the deltoid and anterior shoulder.  Pain is severe, constant, aching with stabbing and burning at times.  The pain is worse with certain reaching and lifting movements as well as at night.  He denies any alleviating factors.  Patient is right hand dominant.       Allergies:   Allergies   Allergen Reactions   • Kiwi Extract Itching and Swelling   • Latex Itching     RASH       Home Medications:    Current Outpatient Medications:   •  hydroCHLOROthiazide (HYDRODIURIL) 12.5 MG tablet, Take 12.5 mg by mouth Daily., Disp: , Rfl:   •  ibuprofen (ADVIL,MOTRIN) 800 MG tablet, Take 1 tablet by mouth 3 (Three) Times a Day., Disp: 90 tablet, Rfl: 0  •  ibuprofen (ADVIL,MOTRIN) 800 MG tablet, Take 1 tablet by mouth 3 (Three) Times a Day., Disp: 90 tablet, Rfl: 1  •  lisinopril (PRINIVIL,ZESTRIL) 10 MG tablet, Take 10 mg by mouth Daily., Disp: , Rfl:   •  lisinopril-hydrochlorothiazide (PRINZIDE,ZESTORETIC) 10-12.5 MG per tablet, Take 1 tablet by mouth Every Morning., Disp: , Rfl:   •  loratadine (CLARITIN) 10 MG tablet, Take 10 mg by mouth As Needed., Disp: , Rfl:   •  meloxicam (MOBIC) 15 MG tablet, Take 1 tablet by mouth Daily As Needed for Moderate Pain . Take as directed with food., Disp: 30 tablet, Rfl: 2  •  omeprazole (priLOSEC) 40 MG capsule, Take 40 mg by mouth 2 " (two) times a day., Disp: , Rfl:     Past Medical History:   Diagnosis Date   • Acute torn meniscus     RT KNEE   • Asthma    • Dysphagia    • GERD (gastroesophageal reflux disease)    • Hypertension    • Seasonal allergies    • Sleep apnea     C-PAP       Past Surgical History:   Procedure Laterality Date   • CATARACT EXTRACTION Bilateral 04/2017   • ESOPHAGEAL DILATATION     • HERNIA REPAIR  06/1988   • KNEE ARTHROSCOPY Right 1/15/2019    Procedure: Knee Arthroscopy with partial medial lateral Menisectomy;  Surgeon: Rodney Vega MD;  [place] [facility] [place] [facility] [place]ics   • KNEE ARTHROSCOPY Right 12/3/2019    Procedure: Knee Arthroscopy with Menisectomy;  Surgeon: Rodney Vega MD;  Location[place] [facility] [place] [facility] [place]  Service: [place]opedics   • NASAL POLYP EXCISION  10/2016    SEPTOPLASTY       Social History     Occupational History   • Not on file   Tobacco Use   • Smoking status: Never Smoker   • Smokeless tobacco: Never Used   Substance and Sexual Activity   • Alcohol use: No   • Drug use: No   • Sexual activity: Defer      Social History     Social History Narrative   • Not on file       Family History   Problem Relation Age of Onset   • Emphysema Father    • Malig Hyperthermia Neg Hx        Review of Systems:      Constitutional: Denies fever, shaking or chills   Eyes: Denies change in visual acuity   HEENT: Denies nasal congestion or sore throat   Respiratory: Denies cough or shortness of breath   Cardiovascular: Denies chest pain or edema  Endocrine: Denies tremors, palpitations, intolerance of heat or cold, polyuria, polydipsia.  GI: Denies abdominal pain, nausea, vomiting, bloody stools or diarrhea  : Denies frequency, urgency, incontinence, retention, or nocturia.  Musculoskeletal: Denies numbness, tingling or loss of motor function except as above  Integument: Denies rash, lesion or ulceration   Neurologic: Denies headache or focal weakness, deficits  Heme: Denies spontaneous or excessive  "bleeding, epistaxis, hematuria, melena, fatigue, enlarged or tender lymph nodes.      All other pertinent positives and negatives as noted above in HPI.    Physical Exam:   55 y.o. male    Vitals:    01/25/21 1526   Temp: 97.7 °F (36.5 °C)   Weight: (!) 138 kg (305 lb)   Height: 182.9 cm (72\")     General:  Patient is awake and alert.  Appears in no acute distress or discomfort.    Psych:  Affect and demeanor are appropriate.    Eyes:  Conjunctiva and sclera appear grossly normal.  Eyes track well and EOM seem to be intact.    Ears:  No gross abnormalities.  Hearing adequate for the exam.    Cardiovascular:  Regular rate and rhythm.    Lungs:  Good chest expansion.  Breathing unlabored.    Lymph:  No palpable adenopathy about neck or axilla.    Neck:  Supple.  Normal ROM.  Negative Spurling's for shoulder or arm pain.    Right upper extremity:  Skin is benign.  No gross abnormalities on inspection including any atrophy, swellings, or masses.  No palpable masses or adenopathy.  No focal areas of significant tenderness.  Shoulder motion is limited and uncomfortable --150° forward elevation, 55° external rotation, internal rotation to back pocket.  No evident instability or apprehension.  Positive Neer, Empty Can, Carmona.  Negative Speeds, Yergason's.  Weakness with forward elevation in the scapular plane.  Good strength with internal and external rotation.  Good strength in the deltoid, biceps, triceps, and .  Intact sensation throughout the arm.  Brisk cap refill.  Palpable radial pulse.  Good skin turgor.         Radiology:   Dr. Vega and I reviewed the x-rays together.  AP, scapular Y, and axillary views of the right shoulder are ordered by myself and reviewed to evaluate the patient's complaint.  No comparison films are immediately available.  The x-rays show a small calcification adjacent to the lesser tuberosity. Otherwise, no obvious acute abnormalities, lesions, masses, significant degenerative " changes, or other concerning findings.  The acromiohumeral interval is normal.  Glenoid version appears normal as well.    Assessment/Plan:   Acute right shoulder pain, calcific tendinitis     We thoroughly discussed options in detail including a trial of conservative treatment.  He has acknowledged understanding of this information.  The patient would prefer to pursue nonsurgical management at this time.  The risk, benefits and alternatives to an injection were thoroughly discussed.  He consented and the injection was performed as described below.  He may continue to take his meloxicam and use ice as needed.  I reminded him of the risks with this medication.  He will follow-up with me as needed going forward.    Bhumi Thorpe, RAY    01/25/2021    CC to Stevie Herndon MD       Large Joint Arthrocentesis: R subacromial bursa  Date/Time: 1/25/2021 3:59 PM  Consent given by: patient  Site marked: site marked  Timeout: Immediately prior to procedure a time out was called to verify the correct patient, procedure, equipment, support staff and site/side marked as required   Supporting Documentation  Indications: pain   Procedure Details  Location: shoulder - R subacromial bursa  Preparation: Patient was prepped and draped in the usual sterile fashion  Needle gauge: 21g.  Approach: posterior  Medications administered: 2 mL lidocaine (cardiac); 80 mg methylPREDNISolone acetate 80 MG/ML  Patient tolerance: patient tolerated the procedure well with no immediate complications

## 2021-01-25 NOTE — PROGRESS NOTES
Physical Therapy Initial Evaluation and Plan of Care        Patient: Maurizio Mcnair   : 1965  Diagnosis/ICD-10 Code:  Chronic pain of right knee [M25.561, G89.29]  Referring practitioner: Rodney Vega MD  Date of Initial Visit: 2021  Today's Date: 2021  Patient seen for 1 sessions           Subjective Questionnaire: LEFS: 40/80      Subjective Evaluation    History of Present Illness  Mechanism of injury: Maurizio is a 55 year old male who presents with R knee pain and a meniscus tear of the R knee. He's had the meniscus repaired twice, this past time he sprained it stepping onto a dock plate at work. He noticed some swelling and saw his ortho MD about it who diagnosed him with a bad sprain. He reports he feels like grinding and rubbing in his knee. He feels pain in his hip occasionally as well. He cannot ascend/descend stairs reciprocally due to the pain. He also cannot stand or walk longer than 10-15 minutes due to knee pain. He takes meloxicam that helps take the edge off.      Patient Occupation:  for [employer] and Work Restrictions: currently off work until he sees MD in ality of life: good    Pain  Current pain ratin  At best pain ratin  At worst pain ratin  Location: kneeling on it or stairs  Quality: grinding and dull ache  Relieving factors: rest and medications  Aggravating factors: stairs, standing, ambulation and squatting  Progression: no change    Diagnostic Tests  X-ray: abnormal  MRI studies: abnormal    Treatments  Current treatment: physical therapy  Patient Goals  Patient goals for therapy: increased strength, return to work and independence with ADLs/IADLs             Objective          Patellar Mobility     Right Knee Patellar tendons within functional limits include the medial and lateral. Hypomobile in the superior and inferior patellar tendon(s).     Strength/Myotome Testing     Left Hip   Planes of Motion   External  "rotation: 4+  Internal rotation: 4+    Right Hip   Planes of Motion   External rotation: 4  Internal rotation: 4+    Left Knee   Flexion: 5  Extension: 5    Right Knee   Flexion: 4  Extension: 4    Swelling     Left Knee Girth Measurement (cm)   Joint line: 44 cm    Right Knee Girth Measurement (cm)   Joint line: 45 cm    Functional Assessment     Forward Step Up 6\"     Right Leg  Pain, ipsilateral trunk side bending, increased forward trunk lean and increased contralateral push off.     Single Leg Stance   Left: 15 seconds  Right: 9 seconds          Assessment & Plan     Assessment  Impairments: abnormal gait, abnormal muscle firing, abnormal or restricted ROM, impaired balance, impaired physical strength, lacks appropriate home exercise program and pain with function  Assessment details: Pt exhibits the following limitations: R knee ROM deficits, decreased R LE strength, decreased R LE balance, pain with stairs and ambulation and inability to perform his work duties because of his pain. Pt's signs and symptoms are consistent with R knee intra-articular injury. Pt would benefit from additional skilled therapy in order to address the aforementioned problems and return to prior level of functioning with minimal functional limitations.  Prognosis: good  Functional Limitations: walking, sitting, standing and stooping  Goals  Plan Goals: Short Term Goals (achieve in 2 weeks):  1. Pt will report pain at worst decreased to 5/10.  2. Pt will increase AROM knee flexion to 120 in order to ascend/descend stairs with minimal compensations.  3. Pt will increase single leg balance on affected leg to greater than 10 seconds with no handheld support to improve gait efficiency and dynamic balance.  Long Term Goals (achieve in 4-6 weeks):  4. Pt will exhibit R knee extension and flexion MMT increase to 5/5 to exhibit functional gait and effective transfers.  5.Pt will ascend and descend a full flight of stairs with pain less than " 3/10.  6. Pt will report an LEFS score of 50 or greater to indicate decreased functional limitations.  7. Pt will return to work with no MD restrictions.    Plan  Therapy options: will be seen for skilled physical therapy services  Planned modality interventions: cryotherapy, TENS and dry needling  Planned therapy interventions: ADL retraining, balance/weight-bearing training, flexibility, functional ROM exercises, gait training, home exercise program, IADL retraining, manual therapy, neuromuscular re-education, soft tissue mobilization, strengthening, stretching and therapeutic activities  Frequency: 2x week  Duration in weeks: 4  Treatment plan discussed with: patient        Manual Therapy:         mins  71923;  Therapeutic Exercise:         mins  76716;     Neuromuscular Cassandra:        mins  91293;    Therapeutic Activity:     15     mins  68426;     Gait Training:           mins  38040;     Ultrasound:          mins  51248;    Electrical Stimulation:         mins  57685 ( );  Dry Needling          mins self-pay    Timed Treatment:   15   mins   Total Treatment:     55   mins    PT SIGNATURE: Abdi Smalls PT, DPT, OCS   DATE TREATMENT INITIATED: 1/25/2021    Initial Certification  Certification Period: 4/25/2021  I certify that the therapy services are furnished while this patient is under my care.  The services outlined above are required by this patient, and will be reviewed every 90 days.     PHYSICIAN: Rodney Vega MD      DATE:     Please sign and return via fax to [phone].. Thank you, [facility].

## 2021-01-25 NOTE — PATIENT INSTRUCTIONS
Access Code: [email or web address]   URL: [email or web address]   Date: 01/25/2021   Prepared by: Abdi Pearson - 10 reps - 2 sets - 1-2x daily - 7x weekly

## 2021-01-27 RX ORDER — METHYLPREDNISOLONE ACETATE 80 MG/ML
80 INJECTION, SUSPENSION INTRA-ARTICULAR; INTRALESIONAL; INTRAMUSCULAR; SOFT TISSUE
Status: COMPLETED | OUTPATIENT
Start: 2021-01-25 | End: 2021-01-25

## 2021-01-29 ENCOUNTER — TREATMENT (OUTPATIENT)
Dept: PHYSICAL THERAPY | Facility: CLINIC | Age: 56
End: 2021-01-29

## 2021-01-29 DIAGNOSIS — S83.209D: ICD-10-CM

## 2021-01-29 DIAGNOSIS — G89.29 CHRONIC PAIN OF RIGHT KNEE: Primary | ICD-10-CM

## 2021-01-29 DIAGNOSIS — M25.561 CHRONIC PAIN OF RIGHT KNEE: Primary | ICD-10-CM

## 2021-01-29 PROCEDURE — 97110 THERAPEUTIC EXERCISES: CPT | Performed by: PHYSICAL THERAPIST

## 2021-01-29 PROCEDURE — 97014 ELECTRIC STIMULATION THERAPY: CPT | Performed by: PHYSICAL THERAPIST

## 2021-01-29 NOTE — PROGRESS NOTES
Physical Therapy Daily Progress Note    Patient: Maurizio Mcnair   : 1965  Diagnosis/ICD-10 Code:  No primary diagnosis found.  Referring practitioner: Rodney Vega MD  Date of Initial Visit: No linked episodes  Today's Date: 2021  Patient seen for Visit count could not be calculated. Make sure you are using a visit which is associated with an episode. sessions           Subjective Doing OK, it's painful on stairs. 3/10 today.     Objective   See Exercise, Manual, and Modality Logs for complete treatment.   Initiated mainly open-chain strengthening today.   At length discussion and education regarding goals of therapy, plan of care, need for strengthening to un-weight knee joint line.     Assessment/Plan pt with mild pain with TKE, may avoid in future if it continues. Ended with Hi-volt and ice to manage any edema/pain. Pt will benefit from strengthening program with transition to weight-bearing when muscle strength allows without joint irritation.          Timed:    Manual Therapy:         mins  51843;  Therapeutic Exercise:    25     mins  89228;     Neuromuscular Cassandra:        mins  42602;    Therapeutic Activity:          mins  04145;     Gait Training:           mins  40791;     Ultrasound:          mins  33745;    Electrical Stimulation:        mins  10052 ( );  Iontophoresis         mins 73806;  Aquatic Therapy         mins 70928;  Dry Needling                   mins    Untimed:  Electrical Stimulation:    15     mins  08921 ( );  Mechanical Traction:         mins  92855;     Timed Treatment:   25   mins   Total Treatment:     45   mins  Anamaria Mclaughlin, PT  Physical Therapist

## 2021-02-02 ENCOUNTER — TREATMENT (OUTPATIENT)
Dept: PHYSICAL THERAPY | Facility: CLINIC | Age: 56
End: 2021-02-02

## 2021-02-02 DIAGNOSIS — G89.29 CHRONIC PAIN OF RIGHT KNEE: Primary | ICD-10-CM

## 2021-02-02 DIAGNOSIS — S83.209D: ICD-10-CM

## 2021-02-02 DIAGNOSIS — M25.561 CHRONIC PAIN OF RIGHT KNEE: Primary | ICD-10-CM

## 2021-02-02 PROCEDURE — 97110 THERAPEUTIC EXERCISES: CPT | Performed by: PHYSICAL THERAPIST

## 2021-02-02 PROCEDURE — 97014 ELECTRIC STIMULATION THERAPY: CPT | Performed by: PHYSICAL THERAPIST

## 2021-02-02 PROCEDURE — 97140 MANUAL THERAPY 1/> REGIONS: CPT | Performed by: PHYSICAL THERAPIST

## 2021-02-02 NOTE — PROGRESS NOTES
Physical Therapy Daily Progress Note        Patient: Maurizio Mcnair   : 1965  Diagnosis/ICD-10 Code:  Chronic pain of right knee [M25.561, G89.29]  Referring practitioner: Rodney Vega MD  Date of Initial Visit: Type: THERAPY  Noted: 2021  Today's Date: 2021  Patient seen for 3 sessions             Subjective pt reports the terminal knee ext ex causes a lot of pain afterwards.      Objective   See Exercise, Manual, and Modality Logs for complete treatment.       Assessment/Plan I gave quad set for HEP as substitute for TKE on days when he is hurting.  Leg press added and tolerated well.        Manual Therapy:    10     mins  41560;  Therapeutic Exercise:    21     mins  36575;     Neuromuscular Cassandra:    0    mins  58014;    Therapeutic Activity:     0     mins  85678;     Gait Trainin     mins  15453;     Ultrasound:     0     mins  54901;    Electrical Stimulation:    15     mins  86052 ( );  Dry Needling     0     mins self-pay    Timed Treatment:   31   mins   Total Treatment:     46   mins    Eneida Mcdaniel, PT  Physical Therapist

## 2021-02-04 ENCOUNTER — TREATMENT (OUTPATIENT)
Dept: PHYSICAL THERAPY | Facility: CLINIC | Age: 56
End: 2021-02-04

## 2021-02-04 DIAGNOSIS — G89.29 CHRONIC PAIN OF RIGHT KNEE: Primary | ICD-10-CM

## 2021-02-04 DIAGNOSIS — S83.209D: ICD-10-CM

## 2021-02-04 DIAGNOSIS — M25.561 CHRONIC PAIN OF RIGHT KNEE: Primary | ICD-10-CM

## 2021-02-04 PROCEDURE — 97112 NEUROMUSCULAR REEDUCATION: CPT | Performed by: PHYSICAL THERAPIST

## 2021-02-04 PROCEDURE — 97110 THERAPEUTIC EXERCISES: CPT | Performed by: PHYSICAL THERAPIST

## 2021-02-04 PROCEDURE — 97014 ELECTRIC STIMULATION THERAPY: CPT | Performed by: PHYSICAL THERAPIST

## 2021-02-04 NOTE — PROGRESS NOTES
Physical Therapy Daily Progress Note    Patient: Maurizio Mcnair   : 1965  Diagnosis/ICD-10 Code:  Chronic pain of right knee [M25.561, G89.29]  Referring practitioner: Rodney Vega MD  Date of Initial Visit: Type: THERAPY  Noted: 2021  Today's Date: 2021  Patient seen for 4 sessions           Subjective Doing ok, it catches more lately since I started therapy. I was able to go to the grocery store. But stairs are still hard. I still had pain with the (TKE) last time even with the right form and lighter band.     Objective   See Exercise, Manual, and Modality Logs for complete treatment.       Assessment/Plan Added rockerboard today as well as weight to LAQ/SAQ without issue. Held on TKE as pt still had pain after lighter resistance last session.          Timed:    Manual Therapy:         mins  37199;  Therapeutic Exercise:    16     mins  04207;     Neuromuscular Cassandra:    14    mins  92339;    Therapeutic Activity:          mins  47962;     Gait Training:           mins  68371;     Ultrasound:          mins  86037;    Electrical Stimulation:         mins  51284 ( );  Iontophoresis         mins 33011;  Aquatic Therapy        mins 61200;  Dry Needling                   mins    Untimed:  Electrical Stimulation:    15     mins  94298 ( );  Mechanical Traction:         mins  41076;     Timed Treatment:   30   mins   Total Treatment:     50   mins  Anamaria Mclaughlin PT  Physical Therapist

## 2021-02-10 ENCOUNTER — TREATMENT (OUTPATIENT)
Dept: PHYSICAL THERAPY | Facility: CLINIC | Age: 56
End: 2021-02-10

## 2021-02-10 DIAGNOSIS — M25.561 CHRONIC PAIN OF RIGHT KNEE: Primary | ICD-10-CM

## 2021-02-10 DIAGNOSIS — G89.29 CHRONIC PAIN OF RIGHT KNEE: Primary | ICD-10-CM

## 2021-02-10 DIAGNOSIS — S83.209D: ICD-10-CM

## 2021-02-10 PROCEDURE — 97112 NEUROMUSCULAR REEDUCATION: CPT | Performed by: PHYSICAL THERAPIST

## 2021-02-10 PROCEDURE — 97110 THERAPEUTIC EXERCISES: CPT | Performed by: PHYSICAL THERAPIST

## 2021-02-10 PROCEDURE — 97530 THERAPEUTIC ACTIVITIES: CPT | Performed by: PHYSICAL THERAPIST

## 2021-02-10 PROCEDURE — 97140 MANUAL THERAPY 1/> REGIONS: CPT | Performed by: PHYSICAL THERAPIST

## 2021-02-10 NOTE — PROGRESS NOTES
Physical Therapy Daily Treatment Note      Patient: Maurizio Mcnair   : 1965  Referring practitioner: Rodney Vega MD  Date of Initial Visit: Type: THERAPY  Noted: 2021  Today's Date: 2/10/2021  Patient seen for 5 sessions         Maurizio Mcnair reports: right knee still limiting me in regards to prolonged activity/ability.          Subjective     Objective   -noted standing with altered weight distribution with more on left vs right LE  See Exercise, Manual, and Modality Logs for complete treatment.     Lengthy discussion regarding:  Exercise rationale/ pain free exercise performance  Anatomy and structure of affected musculature  Ice application  Sleeping positions with pillows  Alternate exercise positions  Verbal/Tactile cues to ensure correct exercise performance/technique    Added: standing weight shift, standing heel/toe raises, standing marches  Hs stretch with strap R LE, quad/hip flexor stretch over table with strap(pain free range due to guarding).      Assessment/Plan  Pt presents with noted altered stance R LE as well as mm guarding and tightness throughout R LE.  Positive response to manual efforts/interventions to improve pain free mobility.  Exercise progression limited due to pt subjective pain complaints report and should benefit from range modifications with activities.         Progress strengthening /stabilization /functional activity as symptoms allow to include SLR regimen and resisted hs curls.           Timed:  Manual Therapy:  15       mins  28351;  Therapeutic Exercise:     15    mins  07907;     Neuromuscular Cassandra:   8     mins  34604;    Therapeutic Activity:     16     mins  84945;     Gait Trainin    mins  52817;     Ultrasound:          mins  59041;      Untimed:  Electrical Stimulation:         mins  32321 ( );  Mechanical Traction:         mins  82116;     Timed Treatment:   56   mins   Total Treatment:   56     mins  Shine Krishnan,  PTA  Physical Therapist  Assistant  [id number]

## 2021-02-12 ENCOUNTER — TREATMENT (OUTPATIENT)
Dept: PHYSICAL THERAPY | Facility: CLINIC | Age: 56
End: 2021-02-12

## 2021-02-12 DIAGNOSIS — S83.209D: ICD-10-CM

## 2021-02-12 DIAGNOSIS — M25.561 CHRONIC PAIN OF RIGHT KNEE: Primary | ICD-10-CM

## 2021-02-12 DIAGNOSIS — G89.29 CHRONIC PAIN OF RIGHT KNEE: Primary | ICD-10-CM

## 2021-02-12 PROCEDURE — 97110 THERAPEUTIC EXERCISES: CPT | Performed by: PHYSICAL THERAPIST

## 2021-02-12 PROCEDURE — 97140 MANUAL THERAPY 1/> REGIONS: CPT | Performed by: PHYSICAL THERAPIST

## 2021-02-12 PROCEDURE — 97530 THERAPEUTIC ACTIVITIES: CPT | Performed by: PHYSICAL THERAPIST

## 2021-02-12 NOTE — PROGRESS NOTES
Physical Therapy Daily Treatment Note      Patient: Maurizio Mcnair   : 1965  Referring practitioner: Rodney Vega MD  Date of Initial Visit: Type: THERAPY  Noted: 2021  Today's Date: 2021  Patient seen for 6 sessions         Maurizio Mcnair reports: right knee was stiff/sore after last sessions exercises and home activity.          Subjective     Objective   See Exercise, Manual, and Modality Logs for complete treatment.   Added:  Supine slr and sidelying hip add R LE and green t band with seated hs curls.  -continued ice application  -awareness of gait to ensure normalcy vs antalgia.    Assessment/Plan   Progressing with HEP and clinic activities associated with right LE rehab.  Subjectively, continues to report right knee pain with limitation of ADL's as well exercise/functional activity capability without increased R knee symptoms/discomfort.  Responds well to manual interventions.        Progress per Plan of Care toward all goals           Timed:  Manual Therapy:   18     mins  42363;  Therapeutic Exercise:    20     mins  90139;     Neuromuscular Cassandra:        mins  27103;    Therapeutic Activity:    12      mins  20175;     Gait Training:           mins  07676;     Ultrasound:          mins  51818;      Untimed:  Electrical Stimulation:         mins  79499 ( );  Mechanical Traction:         mins  60194;     Timed Treatment:  50    mins   Total Treatment:     62   mins  Shine Krishnan PTA  Physical Therapist  Assistant  [id number]

## 2021-02-16 ENCOUNTER — TREATMENT (OUTPATIENT)
Dept: PHYSICAL THERAPY | Facility: CLINIC | Age: 56
End: 2021-02-16

## 2021-02-16 DIAGNOSIS — M25.561 CHRONIC PAIN OF RIGHT KNEE: Primary | ICD-10-CM

## 2021-02-16 DIAGNOSIS — S83.209D: ICD-10-CM

## 2021-02-16 DIAGNOSIS — G89.29 CHRONIC PAIN OF RIGHT KNEE: Primary | ICD-10-CM

## 2021-02-16 PROCEDURE — 97530 THERAPEUTIC ACTIVITIES: CPT | Performed by: PHYSICAL THERAPIST

## 2021-02-16 PROCEDURE — 97140 MANUAL THERAPY 1/> REGIONS: CPT | Performed by: PHYSICAL THERAPIST

## 2021-02-16 PROCEDURE — 97112 NEUROMUSCULAR REEDUCATION: CPT | Performed by: PHYSICAL THERAPIST

## 2021-02-16 PROCEDURE — 97110 THERAPEUTIC EXERCISES: CPT | Performed by: PHYSICAL THERAPIST

## 2021-02-16 NOTE — PROGRESS NOTES
"Physical Therapy Daily Treatment Note      Patient: Maurizio Mcnair   : 1965  Referring practitioner: Rodney Vega MD  Date of Initial Visit: Type: THERAPY  Noted: 2021  Today's Date: 2021  Patient seen for 7 sessions         Maurizio Mcnair reports: that he still gets some \"Stabbing\" pain in right knee but meds help and that is lessening. Continues to notice a \"pop\" when walking in right knee.  Notices that the less he does, the better it feels and that discomfort increases the more he does.  Concerned regarding ability to return to work at expected demand level.        Subjective     Objective   See Exercise, Manual, and Modality Logs for complete treatment.   Added: R unilateral Leg press 60 lbs 2 x 10; low morris walking/step overs x 4 laps in // bars; sink squats for home performance     Simulated job activities(climbing in truck) 12 inch step up R LE with simulated holds within the cable machine.    Assessment/Plan  Subjectively, pt continues to report right knee discomfort albeit somewhat lessened than before.  Objectively, he presents with improved comfort level, movement pattern and gait with in clinic activities than he did a week ago.  Able to increase duration of time on feet with inclinic activities before noting fatigue and ache.        Other  Re assess next visit by primary PT.  Check goals, ROM, etc for request for additional visits of PT until MD appt(3/9)           Timed:  Manual Therapy:    12     mins  26408;  Therapeutic Exercise:   16      mins  04358;     Neuromuscular Cassandra:    8    mins  50847;    Therapeutic Activity:     12     mins  29889;     Gait Training:           mins  75443;     Ultrasound:          mins  73099;      Untimed:  Electrical Stimulation:         mins  58562 ( );  Mechanical Traction:         mins  35238;     Timed Treatment:   48   mins   Total Treatment:    48    mins  Shine Krishnan PTA  Physical Therapist  Assistant  [id number]  "

## 2021-02-18 ENCOUNTER — TREATMENT (OUTPATIENT)
Dept: PHYSICAL THERAPY | Facility: CLINIC | Age: 56
End: 2021-02-18

## 2021-02-18 DIAGNOSIS — M25.561 CHRONIC PAIN OF RIGHT KNEE: Primary | ICD-10-CM

## 2021-02-18 DIAGNOSIS — G89.29 CHRONIC PAIN OF RIGHT KNEE: Primary | ICD-10-CM

## 2021-02-18 PROCEDURE — 97110 THERAPEUTIC EXERCISES: CPT | Performed by: PHYSICAL THERAPIST

## 2021-02-18 NOTE — PROGRESS NOTES
Re-Assessment / Re-Certification      Patient: Maurizio Mcnair   : 1965  Diagnosis/ICD-10 Code:  Chronic pain of right knee [M25.561, G89.29]  Referring practitioner: Rodney Vega MD  Date of Initial Visit: Type: THERAPY  Noted: 2021  Today's Date: 2021  Patient seen for 8 sessions      Subjective:     Subjective Questionnaire: LEFS: 44/80  Clinical Progress: improved  Home Program Compliance: Yes  Treatment has included: therapeutic exercise, neuromuscular re-education, manual therapy, therapeutic activity and gait training    Subjective Evaluation    History of Present Illness    Subjective comment: Pt can tolerate 20-30 minutes without taking a break but doesn't stand or walk for long periods.Pain  At worst pain ratin  Quality: dull ache  Aggravating factors: stairs and standing         Objective          Active Range of Motion     Right Knee   Flexion: 125 degrees     Strength/Myotome Testing     Left Hip   Planes of Motion   Flexion: 5  Extension: 4    Right Hip   Planes of Motion   Flexion: 5  Extension: 4    Right Knee   Flexion: 4+  Extension: 5  Quadriceps contraction: good    Tests     Right Knee   Positive medial Julianna.   Negative Apley's compression and lateral Julianna.     Additional Tests Details  Noticeable crepitus and grinding with Julianna's testing but minimal pain reported. Moderate pain with overpressure into knee extension and knee flexion.    Ambulation     Ambulation: Level Surfaces     Additional Level Surfaces Ambulation Details  Pt could complete 10 minutes of ambulation on a treadmill with no incline and speed of 1.4 mph. He could not achieve full terminal knee extension during stance phase on R LE due to pain and his gait pattern worsened from slightly antalgic to severely antalgic by the end of the 10 minute walk. This indicates that his walking endurance is poor and he could not tolerate frequent walking currently.    Ambulation: Stairs   Ascend stairs:  independent  Pattern: reciprocal  Railings: two rails  Descend stairs: independent  Pattern: reciprocal  Railings: two rails  Curbs: independent    Additional Stairs Ambulation Details  Pt could ascend/descend 12 stairs independently but needs two handrails and he exhibited crepitus and antalgic gait pattern with decreased stance time on R LE. Stair negotiation also increases his pain to 5/10.    Functional Assessment     Single Leg Stance   Left: 30 seconds  Right: 30 seconds      Assessment & Plan     Assessment  Impairments: abnormal gait, activity intolerance, impaired physical strength, pain with function and weight-bearing intolerance  Assessment details: Froy's balance, AROM and knee extension strength have all improved today. He continues to have elevated pain and cannot tolerate walking or standing for long periods, and cannot negotiate stairs or push/pull heavy objects in order to meet his job requirements.  Prognosis: good  Prognosis details: Good prognosis for recovery as he exhibits multiple improvements in objective measures today.  Functional Limitations: walking, pulling, pushing and standing  Goals  Plan Goals: Plan Goals: Short Term Goals (achieve in 2 weeks):  1. Pt will report pain at worst decreased to 5/10. (not met as of 2/18/2021)  2. Pt will increase AROM knee flexion to 120 in order to ascend/descend stairs with minimal compensations. (met as of 2/18/2021)  3. Pt will increase single leg balance on affected leg to greater than 10 seconds with no handheld support to improve gait efficiency and dynamic balance. (met as of 2/18/2021)  Long Term Goals (achieve in 4-6 weeks):  4. Pt will exhibit R knee extension and flexion MMT increase to 5/5 to exhibit functional gait and effective transfers. (extension met on 2/18/2021, flexion goal not achieved)  5.Pt will ascend and descend a full flight of stairs with pain less than 3/10. (not met as of 2/18/2021)  6. Pt will report an LEFS score of 50 or  greater to indicate decreased functional limitations. (not met as of 2/18/2021)  7. Pt will return to work with no MD restrictions. (not met as of 2/18/2021)      Plan  Therapy options: will be seen for skilled physical therapy services  Planned modality interventions: TENS, ultrasound and dry needling  Planned therapy interventions: ADL retraining, balance/weight-bearing training, strengthening, therapeutic activities, joint mobilization, home exercise program, IADL retraining, gait training, functional ROM exercises, neuromuscular re-education and manual therapy  Frequency: 2x week  Duration in visits: 12  Duration in weeks: 6  Treatment plan discussed with: patient        Visit Diagnoses:    ICD-10-CM ICD-9-CM   1. Chronic pain of right knee  M25.561 719.46    G89.29 338.29       Progress toward previous goals: Partially Met    New Goals    Long-term goals (LTG):  1. Pt will push/pull a sled with up to 100 lbs loaded for more than 5 reps of 10 feet to simulate work activities.  2. Pt will increase hip extension strength to 4+/5 bilaterally to improve stride length in ambulation.      Recommendations: Continue as planned  Timeframe: 1 month  Prognosis to achieve goals: good    PT Signature: Jonas Smalls, PT      Based upon review of the patient's progress and continued therapy plan, it is my medical opinion that Maurizio Mcnair should continue physical therapy treatment at [facility]  [address] [place] [id number] [phone].    Signature: __________________________________  Rodney Vega MD    Timed:  Manual Therapy:         mins  02695;  Therapeutic Exercise:    15     mins  83551;     Neuromuscular Cassandra:        mins  69131;    Therapeutic Activity:          mins  59812;     Gait Training:           mins  29140;     Ultrasound:          mins  90284;    Electrical Stimulation:         mins  02088 ( );    Untimed:  Electrical  Stimulation:         mins  72654 ( );  Mechanical Traction:         mins  97012;     Timed Treatment:   45   mins   Total Treatment:     45   mins

## 2021-02-25 ENCOUNTER — TREATMENT (OUTPATIENT)
Dept: PHYSICAL THERAPY | Facility: CLINIC | Age: 56
End: 2021-02-25

## 2021-02-25 DIAGNOSIS — M25.561 CHRONIC PAIN OF RIGHT KNEE: Primary | ICD-10-CM

## 2021-02-25 DIAGNOSIS — G89.29 CHRONIC PAIN OF RIGHT KNEE: Primary | ICD-10-CM

## 2021-02-25 DIAGNOSIS — S83.209D: ICD-10-CM

## 2021-02-25 PROCEDURE — 97112 NEUROMUSCULAR REEDUCATION: CPT | Performed by: PHYSICAL THERAPIST

## 2021-02-25 PROCEDURE — 97110 THERAPEUTIC EXERCISES: CPT | Performed by: PHYSICAL THERAPIST

## 2021-02-25 PROCEDURE — 97530 THERAPEUTIC ACTIVITIES: CPT | Performed by: PHYSICAL THERAPIST

## 2021-02-25 NOTE — PROGRESS NOTES
Physical Therapy Daily Progress Note    Patient: Maurizio Mcnair   : 1965  Diagnosis/ICD-10 Code:  Chronic pain of right knee [M25.561, G89.29]  Referring practitioner: RAY Ross  Date of Initial Visit: Type: THERAPY  Noted: 2021  Today's Date: 2021  Patient seen for 9 sessions         Maurizio Mcnair reports: No new complaints. Concerned about going back to work and his knee hurting again. Feels as though his stamina is improving. 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    Subjective     Objective   See Exercise, Manual, and Modality Logs for complete treatment.       Assessment/Plan  Subjectively, pt reports no increase of pain or discomfort with interventions performed today. Performed well with established exercises with ability to increase reps with sled push/pulls.  Continues to benefit from verbal/tactile cues to ensure proper form and technique for exercise performance.     Progress per Plan of Care           Manual Therapy:         mins  48683;  Therapeutic Exercise:    25     mins  95087;     Neuromuscular Cassandra:    8    mins  49811;    Therapeutic Activity:     12     mins  71486;     Gait Training:           mins  65879;     Ultrasound:          mins  93049;    Electrical Stimulation:         mins  82911 ( );  Dry Needling          mins self-pay    Timed Treatment:   45   mins   Total Treatment:     55   mins    Canadce Franks PTA  Physical Therapist Assistant [id number]

## 2021-02-26 ENCOUNTER — TREATMENT (OUTPATIENT)
Dept: PHYSICAL THERAPY | Facility: CLINIC | Age: 56
End: 2021-02-26

## 2021-02-26 DIAGNOSIS — M25.561 CHRONIC PAIN OF RIGHT KNEE: Primary | ICD-10-CM

## 2021-02-26 DIAGNOSIS — S83.209D: ICD-10-CM

## 2021-02-26 DIAGNOSIS — G89.29 CHRONIC PAIN OF RIGHT KNEE: Primary | ICD-10-CM

## 2021-02-26 PROCEDURE — 97530 THERAPEUTIC ACTIVITIES: CPT | Performed by: PHYSICAL THERAPIST

## 2021-02-26 PROCEDURE — 97140 MANUAL THERAPY 1/> REGIONS: CPT | Performed by: PHYSICAL THERAPIST

## 2021-02-26 PROCEDURE — 97110 THERAPEUTIC EXERCISES: CPT | Performed by: PHYSICAL THERAPIST

## 2021-03-02 ENCOUNTER — TREATMENT (OUTPATIENT)
Dept: PHYSICAL THERAPY | Facility: CLINIC | Age: 56
End: 2021-03-02

## 2021-03-02 DIAGNOSIS — S83.209D: ICD-10-CM

## 2021-03-02 DIAGNOSIS — G89.29 CHRONIC PAIN OF RIGHT KNEE: Primary | ICD-10-CM

## 2021-03-02 DIAGNOSIS — M25.561 CHRONIC PAIN OF RIGHT KNEE: Primary | ICD-10-CM

## 2021-03-02 PROCEDURE — 97112 NEUROMUSCULAR REEDUCATION: CPT | Performed by: PHYSICAL THERAPIST

## 2021-03-02 PROCEDURE — 97530 THERAPEUTIC ACTIVITIES: CPT | Performed by: PHYSICAL THERAPIST

## 2021-03-02 PROCEDURE — 97110 THERAPEUTIC EXERCISES: CPT | Performed by: PHYSICAL THERAPIST

## 2021-03-02 NOTE — PROGRESS NOTES
Physical Therapy Daily Treatment Note      Patient: Maurizio Mcnair   : 1965  Referring practitioner: RAY Ross  Date of Initial Visit: Type: THERAPY  Noted: 2021  Today's Date: 3/2/2021  Patient seen for 11 sessions         Maurizio Mcnair reports: he was very sore after last visit, and he is still struggling to go up and down stairs without excruciating pain.      Subjective     Objective   See Exercise, Manual, and Modality Logs for complete treatment.       Assessment/Plan  Froy could not tolerate advancing any exercises today due to increased pain and soreness. He continues to have difficulty with stairs and cannot perform a deep squat due to knee pain.  Visit Diagnoses:    ICD-10-CM ICD-9-CM   1. Chronic pain of right knee  M25.561 719.46    G89.29 338.29   2. Acute meniscal tear of knee, subsequent encounter  S83.209D V58.89     836.2       Progress per Plan of Care           Timed:  Manual Therapy:         mins  75260;  Therapeutic Exercise:    15     mins  29844;     Neuromuscular Cassandra:    15    mins  36562;    Therapeutic Activity:     15     mins  15118;     Gait Training:           mins  47186;     Ultrasound:          mins  20810;    Electrical Stimulation:         mins  28784 ( );    Untimed:  Electrical Stimulation:         mins  67141 ( );  Mechanical Traction:         mins  25274;     Timed Treatment:   45   mins   Total Treatment:     55   mins  Abdi Smalls PT, DPT, OCS  Physical Therapist

## 2021-03-02 NOTE — PROGRESS NOTES
Physical Therapy Daily Progress Note  10 treatments  Subjective     Maurizio Mcnair reports: having R knee pain with stairs. Is planning to return to work in ~ 2 weeks and needs to be able to perform ~ 12 in step.         Objective   See Exercise, Manual, and Modality Logs for complete treatment.       Assessment/Plan    Patient tolerated all treatment well and was able to tolerate progressions in therapeutic exercises with mild discomfort during stepping. Worked on posterior chain activation during stepping. Patient continues to need skilled therapy to improve Activity tolerance, muscle activation in posterior chain, and pain in R knee during work related activities. Patients work environment requires significant intensity of exercise with the height and placement of his step. PT has concern for return to work environment if patient is unable to progress activity tolerance. Will continue to advance POC as tolerated.       Progress per Plan of Care and Progress strengthening /stabilization /functional activity           Manual Therapy:    8     mins  47901;  Therapeutic Exercise:    24     mins  47841;     Neuromuscular Cassandra:        mins  98253;    Therapeutic Activity:     12     mins  96490;     Gait Training:           mins  33152;     Ultrasound:          mins  68402;    Electrical Stimulation:         mins  02107 ( );  Dry Needling          mins self-pay    Timed Treatment:   44   mins   Total Treatment:     44   mins    Saroj Cabrera PT DPT  Physical Therapist  [place] License # [id number]

## 2021-03-04 ENCOUNTER — TREATMENT (OUTPATIENT)
Dept: PHYSICAL THERAPY | Facility: CLINIC | Age: 56
End: 2021-03-04

## 2021-03-04 DIAGNOSIS — S83.209D: ICD-10-CM

## 2021-03-04 DIAGNOSIS — G89.29 CHRONIC PAIN OF RIGHT KNEE: Primary | ICD-10-CM

## 2021-03-04 DIAGNOSIS — M25.561 CHRONIC PAIN OF RIGHT KNEE: Primary | ICD-10-CM

## 2021-03-04 PROCEDURE — 97112 NEUROMUSCULAR REEDUCATION: CPT | Performed by: PHYSICAL THERAPIST

## 2021-03-04 PROCEDURE — 97530 THERAPEUTIC ACTIVITIES: CPT | Performed by: PHYSICAL THERAPIST

## 2021-03-04 PROCEDURE — 97110 THERAPEUTIC EXERCISES: CPT | Performed by: PHYSICAL THERAPIST

## 2021-03-04 NOTE — PROGRESS NOTES
"   Physical Therapy Daily Treatment Note      Patient: Maurizio Mcnair   : 1965  Referring practitioner: RAY Ross  Date of Initial Visit: Type: THERAPY  Noted: 2021  Today's Date: 3/4/2021  Patient seen for 12 sessions         Maurizio Mcnair reports: he continues to have a lot of trouble with squatting and he cannot use a bicycle because he can't bend his knee enough.  Subjective Questionnaire: LEFS: 48/80      Subjective Evaluation    Pain  Current pain ratin  At best pain ratin  At worst pain ratin  Quality: sharp and knife-like  Relieving factors: relaxation and rest  Aggravating factors: squatting  Progression: improved           Objective          Active Range of Motion     Right Knee   Flexion: 125 degrees   Extension: 0 degrees     Patellar Mobility     Right Knee Patellar tendons within functional limits include the medial, lateral, superior and inferior.     Tests     Right Knee   Positive medial Julianna.   Negative lateral Julianna, patellar apprehension, patella-femoral grind, valgus stress test at 0 degrees, valgus stress test at 30 degrees, varus stress test at 0 degrees and varus stress test at 30 degrees.     Swelling     Left Knee Girth Measurement (cm)   Joint line: 44 cm    Right Knee Girth Measurement (cm)   Joint line: 47 cm    Ambulation     Ambulation: Stairs   Ascend stairs: independent  Pattern: reciprocal  Railings: two rails  Descend stairs: independent  Pattern: reciprocal    Additional Stairs Ambulation Details  Froy's ability to ascend/descend stairs improved today, he could climb 12 stairs with 0/10 pain and a reciprocal gait pattern. 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    Functional Assessment     Comments  Froy could tolerate pushing a sled with an additional 100# added today, but after 8 reps of pushing the sled 10 feet, he reported symptom exacerbation and could not " continue.      See Exercise, Manual, and Modality Logs for complete treatment.       Assessment/Plan  Froy could not meet his work requirements today based on simulated work tasks in the clinic without symptom exacerbation. He exhibits increased R knee joint effusion and a positive Julianna's test today, indicative of intra-articular knee injury.  Visit Diagnoses:    ICD-10-CM ICD-9-CM   1. Chronic pain of right knee  M25.561 719.46    G89.29 338.29   2. Acute meniscal tear of knee, subsequent encounter  S83.209D V58.89     836.2       Progress per Plan of Care           Timed:  Manual Therapy:         mins  44978;  Therapeutic Exercise:    15     mins  64486;     Neuromuscular Cassandra:    15    mins  01967;    Therapeutic Activity:     15     mins  33210;     Gait Training:           mins  36615;     Ultrasound:          mins  29296;    Electrical Stimulation:         mins  10462 ( );    Untimed:  Electrical Stimulation:         mins  38934 ( );  Mechanical Traction:         mins  78298;     Timed Treatment:   45   mins   Total Treatment:     45   mins  Abdi Smalls PT, DPT, OCS  Physical Therapist

## 2021-03-08 ENCOUNTER — OFFICE VISIT (OUTPATIENT)
Dept: ORTHOPEDIC SURGERY | Facility: CLINIC | Age: 56
End: 2021-03-08

## 2021-03-08 VITALS — WEIGHT: 311 LBS | BODY MASS INDEX: 43.54 KG/M2 | TEMPERATURE: 97.2 F | HEIGHT: 71 IN

## 2021-03-08 DIAGNOSIS — M17.10 ARTHRITIS OF KNEE: Primary | ICD-10-CM

## 2021-03-08 PROCEDURE — 99213 OFFICE O/P EST LOW 20 MIN: CPT | Performed by: ORTHOPAEDIC SURGERY

## 2021-03-08 NOTE — PROGRESS NOTES
Patient: Maurizio Mcnair    YOB: 1965    Medical Record Number: [id number]    Chief Complaints: Follow-up regarding right knee arthritis    History of Present Illness:     55 y.o. male patient who presents for follow-up of the right knee.  He reports progressively worsening pain and dysfunction.  Conservative treatment has been ineffective thus far.  The last injection did not help nearly as much.   He reports difficulty walking prolonged distances and has expressed interest in pursuing a potential total knee.    Allergies:   Allergies   Allergen Reactions   • Kiwi Extract Itching and Swelling   • Latex Itching     RASH       Home Medications:    Current Outpatient Medications:   •  hydroCHLOROthiazide (HYDRODIURIL) 12.5 MG tablet, Take 12.5 mg by mouth Daily., Disp: , Rfl:   •  lisinopril (PRINIVIL,ZESTRIL) 10 MG tablet, Take 10 mg by mouth Daily., Disp: , Rfl:   •  lisinopril-hydrochlorothiazide (PRINZIDE,ZESTORETIC) 10-12.5 MG per tablet, Take 1 tablet by mouth Every Morning., Disp: , Rfl:   •  loratadine (CLARITIN) 10 MG tablet, Take 10 mg by mouth As Needed., Disp: , Rfl:   •  meloxicam (MOBIC) 15 MG tablet, Take 1 tablet by mouth Daily As Needed for Moderate Pain . Take as directed with food., Disp: 30 tablet, Rfl: 2  •  omeprazole (priLOSEC) 40 MG capsule, Take 40 mg by mouth 2 (two) times a day., Disp: , Rfl:   •  ibuprofen (ADVIL,MOTRIN) 800 MG tablet, Take 1 tablet by mouth 3 (Three) Times a Day., Disp: 90 tablet, Rfl: 0  •  ibuprofen (ADVIL,MOTRIN) 800 MG tablet, Take 1 tablet by mouth 3 (Three) Times a Day., Disp: 90 tablet, Rfl: 1    Past Medical History:   Diagnosis Date   • Acute torn meniscus     RT KNEE   • Asthma    • Dysphagia    • GERD (gastroesophageal reflux disease)    • Hypertension    • Seasonal allergies    • Sleep apnea     C-PAP       Past Surgical History:   Procedure Laterality Date   • CATARACT EXTRACTION Bilateral 04/2017   • ESOPHAGEAL DILATATION     • HERNIA  "REPAIR  06/1988   • KNEE ARTHROSCOPY Right 1/15/2019    Procedure: Knee Arthroscopy with partial medial lateral Menisectomy;  Surgeon: Rodney Vega MD;  [place] [facility] [place] [facility] [place]  Service: [place]opedics   • KNEE ARTHROSCOPY Right 12/3/2019    Procedure: Knee Arthroscopy with Menisectomy;  Surgeon: Rodney Vega MD;  [place] [facility] [place] [facility] [place]  Service: Orthopedics   • NASAL POLYP EXCISION  10/2016    SEPTOPLASTY       Social History     Occupational History   • Not on file   Tobacco Use   • Smoking status: Never Smoker   • Smokeless tobacco: Never Used   Vaping Use   • Vaping Use: Never used   Substance and Sexual Activity   • Alcohol use: No   • Drug use: No   • Sexual activity: Defer      Social History     Social History Narrative   • Not on file       Family History   Problem Relation Age of Onset   • Emphysema Father    • Malig Hyperthermia Neg Hx        Review of Systems:      Constitutional: Denies fever, shaking or chills   Eyes: Denies change in visual acuity   HEENT: Denies nasal congestion or sore throat   Respiratory: Denies cough or shortness of breath   Cardiovascular: Denies chest pain or edema  Endocrine: Denies tremors, palpitations, intolerance of heat or cold, polyuria, polydipsia.  GI: Denies abdominal pain, nausea, vomiting, bloody stools or diarrhea  : Denies frequency, urgency, incontinence, retention, or nocturia.  Musculoskeletal: Denies numbness, tingling or loss of motor function except as above  Integument: Denies rash, lesion or ulceration   Neurologic: Denies headache or focal weakness, deficits  Heme: Denies spontaneous or excessive bleeding, epistaxis, hematuria, melena, fatigue, enlarged or tender lymph nodes.      All other pertinent positives and negatives as noted above in HPI.    Physical Exam:   55 y.o. male  Vitals:    03/08/21 0755   Temp: 97.2 °F (36.2 °C)   Weight: (!) 141 kg (311 lb)   Height: 180.3 cm (71\")     General:  Patient is awake and alert.  Appears in no " acute distress or discomfort.    Psych:  Affect and demeanor are appropriate.    Eyes:  Conjunctiva and sclera appear grossly normal.  Eyes track well and EOM seem to be intact.    Ears:  No gross abnormalities.  Hearing adequate for the exam.    Cardiovascular:  Regular rate and rhythm.    Lungs:  Good chest expansion.  Breathing unlabored.    Extremities:  Right knee is examined.  Skin is benign.  Moderate medial compartment tenderness.  Motion is good.  He has full extension and flexion to about 120 degrees.  Normal motor and sensory function in the lower leg and foot.  Palpable pedal pulses.  Brisk capillary refill.    Imaging: His previous MRI the right knee is reviewed along the associated report.  He has progressive medial compartment osteoarthritis.      Assessment/Plan:  Right knee osteoarthritis    We discussed the natural history of this condition and all available treatment options, both surgical and nonsurgical.  He feels like conservative treatment has failed and wants to move forward with a replacement.  The risk, benefits and alternatives to a total knee arthroplasty were carefully discussed all questions posed by the patient were answered in detail.  He acknowledged understanding of this information and does wish to proceed.  We will look at getting this scheduled.  I will have him follow-up with either myself or Bhumi for a preoperative visit.    Rodney Vega MD    03/08/2021

## 2021-03-09 PROBLEM — M17.10 ARTHRITIS OF KNEE: Status: ACTIVE | Noted: 2021-03-09

## 2021-03-09 RX ORDER — CEFAZOLIN SODIUM 2 G/100ML
2 INJECTION, SOLUTION INTRAVENOUS ONCE
Status: CANCELLED | OUTPATIENT
Start: 2021-05-06 | End: 2021-03-09

## 2021-03-09 RX ORDER — MELOXICAM 7.5 MG/1
15 TABLET ORAL ONCE
Status: CANCELLED | OUTPATIENT
Start: 2021-05-06 | End: 2021-03-09

## 2021-03-09 RX ORDER — PREGABALIN 75 MG/1
150 CAPSULE ORAL ONCE
Status: CANCELLED | OUTPATIENT
Start: 2021-05-06 | End: 2021-03-09

## 2021-03-09 RX ORDER — ACETAMINOPHEN 325 MG/1
1000 TABLET ORAL ONCE
Status: CANCELLED | OUTPATIENT
Start: 2021-05-06 | End: 2021-03-09

## 2021-03-22 ENCOUNTER — TELEPHONE (OUTPATIENT)
Dept: ORTHOPEDIC SURGERY | Facility: CLINIC | Age: 56
End: 2021-03-22

## 2021-03-22 NOTE — TELEPHONE ENCOUNTER
It is probably coming from his back.  May need to get him an appointment with Bhumi to get it checked out.

## 2021-03-22 NOTE — TELEPHONE ENCOUNTER
Patient wanting to report that he is  Experiencing numbness and burning pain on lateral hip down to lateral knee.     Meloxicam helps a little.    Please advise

## 2021-03-24 ENCOUNTER — OFFICE VISIT (OUTPATIENT)
Dept: ORTHOPEDIC SURGERY | Facility: CLINIC | Age: 56
End: 2021-03-24

## 2021-03-24 VITALS — TEMPERATURE: 97.6 F | HEIGHT: 71 IN | WEIGHT: 310 LBS | BODY MASS INDEX: 43.4 KG/M2

## 2021-03-24 DIAGNOSIS — M17.11 ARTHRITIS OF RIGHT KNEE: Primary | ICD-10-CM

## 2021-03-24 PROCEDURE — 99213 OFFICE O/P EST LOW 20 MIN: CPT | Performed by: NURSE PRACTITIONER

## 2021-03-24 NOTE — PROGRESS NOTES
"Chief Complaint:  Follow up regarding right knee pain    HPI:  Patient returns today for right knee follow up.  Reports the gel injection seemed to help.  He was doing \"work conditioning\" type exercises in physical therapy when his pain significantly increased.  He tells me he used crutches for 2 days afterward.  He is not currently in formal physical therapy, but reports he continues to do exercises at home.  Localizes the majority of his pain to the lateral aspect.  Reports pain frequently radiates slightly above and below the knee.  Describes his current pain as moderate, constant, aching.  Pain is worse after activities such as standing, walking, household chores, and exercises.  Ice, knee brace, meloxicam, and Tylenol Arthritis help somewhat.  Reports intermittent he would like to know if there is anything else he can do for the pain, but he is not interested in narcotics.  Of note, he tells me he has an appointment with another provider for a second opinion regarding knee replacement surgery.     Vitals:    03/24/21 1414   Temp: 97.6 °F (36.4 °C)   Weight: (!) 141 kg (310 lb)   Height: 180.3 cm (71\")       Exam:  Right knee is examined.  Skin is benign.  Previous portals are healed.  Mild edema.  No erythema or increased warmth.  Tenderness to palpation both medial and lateral joint line.  Motion is: Full extension to 120° of flexion.  No obvious instability.    Imaging: None taken today    Assessment: Right knee osteoarthritis with worsening pain and dysfunction    Plan: We discussed the natural history of this condition treatments including surgical and nonsurgical options.  I explained it is too soon to repeat an injection today, but continuing conservative treatments with anti-inflammatories, rest, ice, activity modifications and knee brace are all recommended.  He has a knee brace that he has been using, but it is not in good condition.  Since, he needs a special latex free knee brace  I will ask Mark to " get him a replacement.  Going forward, he may follow-up with me next month for a repeat injection or with Dr. Vega to pursue surgical options.  He said he will see what the other provider has to say about an arthroplasty and get back with me.    Bhumi Thorpe, RAY     03/24/2021

## 2021-03-30 ENCOUNTER — TELEPHONE (OUTPATIENT)
Dept: ORTHOPEDIC SURGERY | Facility: CLINIC | Age: 56
End: 2021-03-30

## 2021-03-30 NOTE — TELEPHONE ENCOUNTER
I SPOKE WITH CARLITA SHE SAID BETH PATTON IS NOW THE ADJUSTOR ON THIS CASE, PT IS SCHEDULED FOR AN JON ON 04-07-21,LLR

## 2021-04-21 ENCOUNTER — TELEPHONE (OUTPATIENT)
Dept: ORTHOPEDIC SURGERY | Facility: CLINIC | Age: 56
End: 2021-04-21

## 2021-04-21 NOTE — TELEPHONE ENCOUNTER
Caller: KOFI PEREZ    Relationship to patient: SELF    Best call back number: [phone]    Chief complaint: PATIENT NEEDING RIGHT KNEE SURGERY BUT SAYS [employer] DENYING CLAIM FOR SURGERY. HE SAYS HIS  SAID TO GO AHEAD AND FILE UNDER PERSONAL INSURANCE AND THEY CAN SHIRLEY [employer] AND GET REIMBURSED.

## 2021-04-21 NOTE — TELEPHONE ENCOUNTER
Not sure how this works but can you go ahead and schedule him through his private insurance?  Let me know what we need to do.  You may need to talk to Jailene…

## 2021-04-26 ENCOUNTER — PRE-ADMISSION TESTING (OUTPATIENT)
Dept: PREADMISSION TESTING | Facility: HOSPITAL | Age: 56
End: 2021-04-26

## 2021-04-26 ENCOUNTER — TRANSCRIBE ORDERS (OUTPATIENT)
Dept: PREADMISSION TESTING | Facility: HOSPITAL | Age: 56
End: 2021-04-26

## 2021-04-26 VITALS
BODY MASS INDEX: 41.72 KG/M2 | WEIGHT: 308 LBS | DIASTOLIC BLOOD PRESSURE: 90 MMHG | HEIGHT: 72 IN | SYSTOLIC BLOOD PRESSURE: 129 MMHG | RESPIRATION RATE: 16 BRPM | OXYGEN SATURATION: 97 % | HEART RATE: 70 BPM | TEMPERATURE: 97 F

## 2021-04-26 DIAGNOSIS — M17.10 ARTHRITIS OF KNEE: ICD-10-CM

## 2021-04-26 DIAGNOSIS — Z01.818 OTHER SPECIFIED PRE-OPERATIVE EXAMINATION: Primary | ICD-10-CM

## 2021-04-26 LAB
ANION GAP SERPL CALCULATED.3IONS-SCNC: 9.4 MMOL/L (ref 5–15)
BUN SERPL-MCNC: 22 MG/DL (ref 6–20)
BUN/CREAT SERPL: 21 (ref 7–25)
CALCIUM SPEC-SCNC: 9.3 MG/DL (ref 8.6–10.5)
CHLORIDE SERPL-SCNC: 102 MMOL/L (ref 98–107)
CO2 SERPL-SCNC: 24.6 MMOL/L (ref 22–29)
CREAT SERPL-MCNC: 1.05 MG/DL (ref 0.76–1.27)
DEPRECATED RDW RBC AUTO: 41.1 FL (ref 37–54)
ERYTHROCYTE [DISTWIDTH] IN BLOOD BY AUTOMATED COUNT: 12.7 % (ref 12.3–15.4)
GFR SERPL CREATININE-BSD FRML MDRD: 74 ML/MIN/1.73
GLUCOSE SERPL-MCNC: 98 MG/DL (ref 65–99)
HCT VFR BLD AUTO: 46.9 % (ref 37.5–51)
HGB BLD-MCNC: 15.4 G/DL (ref 13–17.7)
MCH RBC QN AUTO: 29.1 PG (ref 26.6–33)
MCHC RBC AUTO-ENTMCNC: 32.8 G/DL (ref 31.5–35.7)
MCV RBC AUTO: 88.7 FL (ref 79–97)
PLATELET # BLD AUTO: 217 10*3/MM3 (ref 140–450)
PMV BLD AUTO: 10.1 FL (ref 6–12)
POTASSIUM SERPL-SCNC: 4.3 MMOL/L (ref 3.5–5.2)
QT INTERVAL: 381 MS
RBC # BLD AUTO: 5.29 10*6/MM3 (ref 4.14–5.8)
SODIUM SERPL-SCNC: 136 MMOL/L (ref 136–145)
WBC # BLD AUTO: 7.26 10*3/MM3 (ref 3.4–10.8)

## 2021-04-26 PROCEDURE — 93010 ELECTROCARDIOGRAM REPORT: CPT | Performed by: INTERNAL MEDICINE

## 2021-04-26 PROCEDURE — 93005 ELECTROCARDIOGRAM TRACING: CPT

## 2021-04-26 PROCEDURE — 85027 COMPLETE CBC AUTOMATED: CPT

## 2021-04-26 PROCEDURE — 80048 BASIC METABOLIC PNL TOTAL CA: CPT

## 2021-04-26 PROCEDURE — 36415 COLL VENOUS BLD VENIPUNCTURE: CPT

## 2021-04-26 RX ORDER — ACETAMINOPHEN 500 MG
1000 TABLET ORAL NIGHTLY
COMMUNITY
End: 2021-05-06 | Stop reason: HOSPADM

## 2021-04-26 ASSESSMENT — KOOS JR
KOOS JR SCORE: 44.905
KOOS JR SCORE: 17

## 2021-04-26 NOTE — DISCHARGE INSTRUCTIONS
Take the following medications the morning of surgery:  OMEPRAZOLE      ARRIVE TO MAIN OR DESK 5/6/21:  HOSPITAL WILL CALL YOU WITH ARRIVAL TIME    If you are on prescription narcotic pain medication to control your pain you may also take that medication the morning of surgery.    General Instructions:  • Do not eat solid food after midnight the night before surgery.  • You may drink clear liquids day of surgery but must stop at least one hour before your hospital arrival time.  • It is beneficial for you to have a clear drink that contains carbohydrates the day of surgery.  We suggest a 12 to 20 ounce bottle of Gatorade or Powerade for non-diabetic patients or a 12 to 20 ounce bottle of G2 or Powerade Zero for diabetic patients. (Pediatric patients, are not advised to drink a 12 to 20 ounce carbohydrate drink)    Clear liquids are liquids you can see through.  Nothing red in color.     Plain water                               Sports drinks  Sodas                                   Gelatin (Jell-O)  Fruit juices without pulp such as white grape juice and apple juice  Popsicles that contain no fruit or yogurt  Tea or coffee (no cream or milk added)  Gatorade / Powerade  G2 / Powerade Zero    • Infants may have breast milk up to four hours before surgery.  • Infants drinking formula may drink formula up to six hours before surgery.   • Patients who avoid smoking, chewing tobacco and alcohol for 4 weeks prior to surgery have a reduced risk of post-operative complications.  Quit smoking as many days before surgery as you can.  • Do not smoke, use chewing tobacco or drink alcohol the day of surgery.   • If applicable bring your C-PAP/ BI-PAP machine.  • Bring any papers given to you in the doctor’s office.  • Wear clean comfortable clothes.  • Do not wear contact lenses, false eyelashes or make-up.  Bring a case for your glasses.   • Bring crutches or walker if applicable.  • Remove all piercings.  Leave jewelry and any  other valuables at home.  • Hair extensions with metal clips must be removed prior to surgery.  • The Pre-Admission Testing nurse will instruct you to bring medications if unable to obtain an accurate list in Pre-Admission Testing.          Preventing a Surgical Site Infection:  • For 2 to 3 days before surgery, avoid shaving with a razor because the razor can irritate skin and make it easier to develop an infection.    • Any areas of open skin can increase the risk of a post-operative wound infection by allowing bacteria to enter and travel throughout the body.  Notify your surgeon if you have any skin wounds / rashes even if it is not near the expected surgical site.  The area will need assessed to determine if surgery should be delayed until it is healed.  • The night prior to surgery shower using a fresh bar of anti-bacterial soap (such as Dial) and clean washcloth.  Sleep in a clean bed with clean clothing.  Do not allow pets to sleep with you.  • Shower on the morning of surgery using a fresh bar of anti-bacterial soap (such as Dial) and clean washcloth.  Dry with a clean towel and dress in clean clothing.  • Ask your surgeon if you will be receiving antibiotics prior to surgery.  • Make sure you, your family, and all healthcare providers clean their hands with soap and water or an alcohol based hand  before caring for you or your wound.    Day of surgery:  Your arrival time is approximately two hours before your scheduled surgery time.  Upon arrival, a Pre-op nurse and Anesthesiologist will review your health history, obtain vital signs, and answer questions you may have.  The only belongings needed at this time will be a list of your home medications and if applicable your C-PAP/BI-PAP machine.  A Pre-op nurse will start an IV and you may receive medication in preparation for surgery, including something to help you relax.     Please be aware that surgery does come with discomfort.  We want to make  every effort to control your discomfort so please discuss any uncontrolled symptoms with your nurse.   Your doctor will most likely have prescribed pain medications.      If you are going home after surgery you will receive individualized written care instructions before being discharged.  A responsible adult must drive you to and from the hospital on the day of your surgery and stay with you for 24 hours.  Discharge prescriptions can be filled by the hospital pharmacy during regular pharmacy hours.  If you are having surgery late in the day/evening your prescription may be e-prescribed to your pharmacy.  Please verify your pharmacy hours or chose a 24 hour pharmacy to avoid not having access to your prescription because your pharmacy has closed for the day.    If you are staying overnight following surgery, you will be transported to your hospital room following the recovery period.  [facility] has all private rooms.    If you have any questions please call Pre-Admission Testing at [phone].  Deductibles and co-payments are collected on the day of service. Please be prepared to pay the required co-pay, deductible or deposit on the day of service as defined by your plan.    Patient Education for Self-Quarantine Process    Following your COVID testing, we strongly recommend that you do not leave your home after you have been tested for COVID except to get medical care. This includes not going to work, school or to public areas.  If this is not possible for you to do please limit your activities to only required outings.  Be sure to wear a mask when you are with other people, practice social distancing and wash your hands frequently.      The following items provide additional details to keep you safe.  • Wash your hands with soap and water frequently for at least 20 seconds.   • Avoid touching your eyes, nose and mouth with unwashed hands.  • Do not share anything - utensils, towels, food from the  same bowl.   • Have your own utensils, drinking glass, dishes, towels and bedding.   • Do not have visitors.   • Do use FaceTime to stay in touch with family and friends.  • You should stay in a specific room away from others if possible.   • Stay at least 6 feet away from others in the home if you cannot have a dedicated room to yourself.   • Do not snuggle with your pet. While the CDC says there is no evidence that pets can spread COVID-19 or be infected from humans, it is probably best to avoid “petting, snuggling, being kissed or licked and sharing food (during self-quarantine)”, according to the CDC.   • Sanitize household surfaces daily. Include all high touch areas (door handles, light switches, phones, countertops, etc.)  • Do not share a bathroom with others, if possible.   • Wear a mask around others in your home if you are unable to stay in a separate room or 6 feet apart. If  you are unable to wear a mask, have your family member wear a mask if they must be within 6 feet of you.   Call your surgeon immediately if you experience any of the following symptoms:  • Sore Throat  • Shortness of Breath or difficulty breathing  • Cough  • Chills  • Body soreness or muscle pain  • Headache  • Fever  • New loss of taste or smell  • Do not arrive for your surgery ill.  Your procedure will need to be rescheduled to another time.  You will need to call your physician before the day of surgery to avoid any unnecessary exposure to hospital staff as well as other patients.    CHLORHEXIDINE CLOTH INSTRUCTIONS  The morning of surgery follow these instructions using the Chlorhexidine cloths you've been given.  These steps reduce bacteria on the body.  Do not use the cloths near your eyes, ears mouth, genitalia or on open wounds.  Throw the cloths away after use but do not try to flush them down a toilet.      • Open and remove one cloth at a time from the package.    • Leave the cloth unfolded and begin the  bathing.  • Massage the skin with the cloths using gentle pressure to remove bacteria.  Do not scrub harshly.   • Follow the steps below with one 2% CHG cloth per area (6 total cloths).  • One cloth for neck, shoulders and chest.  • One cloth for both arms, hands, fingers and underarms (do underarms last).  • One cloth for the abdomen followed by groin.  • One cloth for right leg and foot including between the toes.  • One cloth for left leg and foot including between the toes.  • The last cloth is to be used for the back of the neck, back and buttocks.    Allow the CHG to air dry 3 minutes on the skin which will give it time to work and decrease the chance of irritation.  The skin may feel sticky until it is dry.  Do not rinse with water or any other liquid or you will lose the beneficial effects of the CHG.  If mild skin irritation occurs, do rinse the skin to remove the CHG.  Report this to the nurse at time of admission.  Do not apply lotions, creams, ointments, deodorants or perfumes after using the clothes. Dress in clean clothes before coming to the hospital.    BACTROBAN NASAL OINTMENT  There are many germs normally in your nose. Bactroban is an ointment that will help reduce these germs. Please follow these instructions for Bactroban use:      ____The day before surgery in the morning  Date________    ____The day before surgery in the evening              Date________    ____The day of surgery in the morning    Date________    **Squirt ½ package of Bactroban Ointment onto a cotton applicator and apply to inside of 1st nostril.  Squirt the remaining Bactroban and apply to the inside of the other nostril.

## 2021-04-27 ENCOUNTER — TELEPHONE (OUTPATIENT)
Dept: ORTHOPEDIC SURGERY | Facility: CLINIC | Age: 56
End: 2021-04-27

## 2021-04-27 NOTE — TELEPHONE ENCOUNTER
DR BHAKTA, I NEED AN ORDER FOR THE TOTAL KNEE UNDER MEDICAL WC DENIED, PT HAD JON AND HE AT [facility],  THEY ARE NOT PAYING ANY BILLS FOR HIM.   SO I NEED TO USE HIS MEDICAL,  THANKS VINCENT

## 2021-04-28 ENCOUNTER — PREP FOR SURGERY (OUTPATIENT)
Dept: OTHER | Facility: HOSPITAL | Age: 56
End: 2021-04-28

## 2021-04-28 DIAGNOSIS — M17.10 ARTHRITIS OF KNEE: Primary | ICD-10-CM

## 2021-04-28 RX ORDER — PREGABALIN 75 MG/1
150 CAPSULE ORAL ONCE
Status: CANCELLED | OUTPATIENT
Start: 2021-04-28 | End: 2021-04-28

## 2021-04-28 RX ORDER — CEFAZOLIN SODIUM 2 G/100ML
2 INJECTION, SOLUTION INTRAVENOUS ONCE
Status: CANCELLED | OUTPATIENT
Start: 2021-04-28 | End: 2021-04-28

## 2021-04-28 RX ORDER — CHLORHEXIDINE GLUCONATE 500 MG/1
1 CLOTH TOPICAL TAKE AS DIRECTED
Status: CANCELLED | OUTPATIENT
Start: 2021-04-28

## 2021-04-28 RX ORDER — MELOXICAM 15 MG/1
15 TABLET ORAL ONCE
Status: CANCELLED | OUTPATIENT
Start: 2021-04-28 | End: 2021-04-28

## 2021-04-28 RX ORDER — ACETAMINOPHEN 500 MG
1000 TABLET ORAL ONCE
Status: CANCELLED | OUTPATIENT
Start: 2021-04-28 | End: 2021-04-28

## 2021-04-28 NOTE — TELEPHONE ENCOUNTER
There is an order in the computer from March 9.  It says that it has not been released yet.  Is this order sufficient or if you need a new order?

## 2021-04-30 ENCOUNTER — OFFICE VISIT (OUTPATIENT)
Dept: ORTHOPEDIC SURGERY | Facility: CLINIC | Age: 56
End: 2021-04-30

## 2021-04-30 VITALS — WEIGHT: 308 LBS | TEMPERATURE: 97.8 F | HEIGHT: 72 IN | BODY MASS INDEX: 41.72 KG/M2

## 2021-04-30 DIAGNOSIS — Z01.818 PREOP EXAMINATION: Primary | ICD-10-CM

## 2021-04-30 PROCEDURE — 77077 JOINT SURVEY SINGLE VIEW: CPT | Performed by: ORTHOPAEDIC SURGERY

## 2021-04-30 PROCEDURE — S0260 H&P FOR SURGERY: HCPCS | Performed by: NURSE PRACTITIONER

## 2021-05-04 ENCOUNTER — LAB (OUTPATIENT)
Dept: LAB | Facility: HOSPITAL | Age: 56
End: 2021-05-04

## 2021-05-04 DIAGNOSIS — Z01.818 OTHER SPECIFIED PRE-OPERATIVE EXAMINATION: ICD-10-CM

## 2021-05-04 PROCEDURE — C9803 HOPD COVID-19 SPEC COLLECT: HCPCS

## 2021-05-04 PROCEDURE — U0004 COV-19 TEST NON-CDC HGH THRU: HCPCS

## 2021-05-05 ENCOUNTER — TELEPHONE (OUTPATIENT)
Dept: ORTHOPEDIC SURGERY | Facility: HOSPITAL | Age: 56
End: 2021-05-05

## 2021-05-05 LAB — SARS-COV-2 RNA RESP QL NAA+PROBE: NOT DETECTED

## 2021-05-05 NOTE — TELEPHONE ENCOUNTER
Pre-op OTJA Call  Pain Risk:  ? Currently on narcotics  ? ETOH > 3 drinks/day  ? Pain management patient  ? Current cannaboid use  No issues at this time  Cardiac Risk:  ? Arrhythmias  ? Stent/MI  ? Pacer  ? Heart Failure  No issues at this time  Respiratory Risk:  ? Sleep apnea  ? CPAP machine use  ? Nightly snoring  ? Asthma/COPD  Shallow breather on CPAP  Diabetic Risk:  HgA1C:  Click or tap here to enter text.  ? Insulin use  ? More than 1 diabetic medication  No issues at this time  Urinary retention:  No    Caregiver 24-48hrs post-discharge: Home with family     DME:  ? None/will need before discharge  ? Have walker and/or cane  Will need BSC   Discharge Plan:  Home with [facility]    Prescriptions:  Meds to bed    Educate patient on spinal anesthesia/pain control:  ? patient verbalize understanding    Educate patient on hospital course/timeline:  ?  patient verbalize understanding    3 steps to get into house    Completed joint class

## 2021-05-06 ENCOUNTER — HOSPITAL ENCOUNTER (OUTPATIENT)
Facility: HOSPITAL | Age: 56
Discharge: HOME | End: 2021-05-06
Attending: ORTHOPAEDIC SURGERY | Admitting: ORTHOPAEDIC SURGERY

## 2021-05-06 ENCOUNTER — ANESTHESIA (OUTPATIENT)
Dept: PERIOP | Facility: HOSPITAL | Age: 56
End: 2021-05-06

## 2021-05-06 ENCOUNTER — APPOINTMENT (OUTPATIENT)
Dept: GENERAL RADIOLOGY | Facility: HOSPITAL | Age: 56
End: 2021-05-06

## 2021-05-06 ENCOUNTER — ANESTHESIA EVENT (OUTPATIENT)
Dept: PERIOP | Facility: HOSPITAL | Age: 56
End: 2021-05-06

## 2021-05-06 VITALS
BODY MASS INDEX: 42.1 KG/M2 | TEMPERATURE: 97 F | SYSTOLIC BLOOD PRESSURE: 145 MMHG | RESPIRATION RATE: 16 BRPM | OXYGEN SATURATION: 93 % | WEIGHT: 310.85 LBS | HEIGHT: 72 IN | HEART RATE: 72 BPM | DIASTOLIC BLOOD PRESSURE: 90 MMHG

## 2021-05-06 DIAGNOSIS — M17.10 ARTHRITIS OF KNEE: ICD-10-CM

## 2021-05-06 PROCEDURE — 25010000002 MORPHINE PER 10 MG: Performed by: ORTHOPAEDIC SURGERY

## 2021-05-06 PROCEDURE — 97110 THERAPEUTIC EXERCISES: CPT

## 2021-05-06 PROCEDURE — 25010000002 VANCOMYCIN 10 G RECONSTITUTED SOLUTION: Performed by: ORTHOPAEDIC SURGERY

## 2021-05-06 PROCEDURE — 25010000002 MIDAZOLAM PER 1 MG: Performed by: ANESTHESIOLOGY

## 2021-05-06 PROCEDURE — 25010000002 NEOSTIGMINE 5 MG/10ML SOLUTION: Performed by: NURSE ANESTHETIST, CERTIFIED REGISTERED

## 2021-05-06 PROCEDURE — C1713 ANCHOR/SCREW BN/BN,TIS/BN: HCPCS | Performed by: ORTHOPAEDIC SURGERY

## 2021-05-06 PROCEDURE — 27447 TOTAL KNEE ARTHROPLASTY: CPT | Performed by: ORTHOPAEDIC SURGERY

## 2021-05-06 PROCEDURE — 25010000002 DEXAMETHASONE PER 1 MG: Performed by: NURSE ANESTHETIST, CERTIFIED REGISTERED

## 2021-05-06 PROCEDURE — 25010000002 SUCCINYLCHOLINE PER 20 MG: Performed by: NURSE ANESTHETIST, CERTIFIED REGISTERED

## 2021-05-06 PROCEDURE — C1776 JOINT DEVICE (IMPLANTABLE): HCPCS | Performed by: ORTHOPAEDIC SURGERY

## 2021-05-06 PROCEDURE — 25010000002 FENTANYL CITRATE (PF) 100 MCG/2ML SOLUTION: Performed by: NURSE ANESTHETIST, CERTIFIED REGISTERED

## 2021-05-06 PROCEDURE — 25010000003 CEFAZOLIN IN DEXTROSE 2-4 GM/100ML-% SOLUTION: Performed by: NURSE ANESTHETIST, CERTIFIED REGISTERED

## 2021-05-06 PROCEDURE — 25010000002 ROPIVACAINE PER 1 MG: Performed by: ORTHOPAEDIC SURGERY

## 2021-05-06 PROCEDURE — 27447 TOTAL KNEE ARTHROPLASTY: CPT | Performed by: NURSE PRACTITIONER

## 2021-05-06 PROCEDURE — 25010000002 ONDANSETRON PER 1 MG: Performed by: NURSE ANESTHETIST, CERTIFIED REGISTERED

## 2021-05-06 PROCEDURE — 25010000002 HYDROMORPHONE PER 4 MG: Performed by: NURSE ANESTHETIST, CERTIFIED REGISTERED

## 2021-05-06 PROCEDURE — 25010000002 FENTANYL CITRATE (PF) 100 MCG/2ML SOLUTION: Performed by: ANESTHESIOLOGY

## 2021-05-06 PROCEDURE — 25010000002 PROPOFOL 10 MG/ML EMULSION: Performed by: NURSE ANESTHETIST, CERTIFIED REGISTERED

## 2021-05-06 PROCEDURE — 25010000002 ROPIVACAINE PER 1 MG: Performed by: ANESTHESIOLOGY

## 2021-05-06 PROCEDURE — 25010000002 EPINEPHRINE 30 MG/30ML SOLUTION: Performed by: ORTHOPAEDIC SURGERY

## 2021-05-06 PROCEDURE — G0378 HOSPITAL OBSERVATION PER HR: HCPCS

## 2021-05-06 PROCEDURE — 97161 PT EVAL LOW COMPLEX 20 MIN: CPT

## 2021-05-06 PROCEDURE — 73560 X-RAY EXAM OF KNEE 1 OR 2: CPT

## 2021-05-06 PROCEDURE — 25010000002 ONDANSETRON PER 1 MG: Performed by: ORTHOPAEDIC SURGERY

## 2021-05-06 PROCEDURE — C1889 IMPLANT/INSERT DEVICE, NOC: HCPCS | Performed by: ORTHOPAEDIC SURGERY

## 2021-05-06 PROCEDURE — 25010000002 KETOROLAC TROMETHAMINE PER 15 MG: Performed by: ORTHOPAEDIC SURGERY

## 2021-05-06 PROCEDURE — 25010000003 CEFAZOLIN IN DEXTROSE 2-4 GM/100ML-% SOLUTION: Performed by: ORTHOPAEDIC SURGERY

## 2021-05-06 PROCEDURE — 76942 ECHO GUIDE FOR BIOPSY: CPT | Performed by: ORTHOPAEDIC SURGERY

## 2021-05-06 DEVICE — DEV CONTRL TISS STRATAFIX SYMM PDS PLUS VIL CT-1 45CM: Type: IMPLANTABLE DEVICE | Site: KNEE | Status: FUNCTIONAL

## 2021-05-06 DEVICE — DEV CONTRL TISS STRATAFIX SPIRAL MNCRYL UD 3/0 PLS 30CM: Type: IMPLANTABLE DEVICE | Site: KNEE | Status: FUNCTIONAL

## 2021-05-06 DEVICE — IMPLANTABLE DEVICE: Type: IMPLANTABLE DEVICE | Site: KNEE | Status: FUNCTIONAL

## 2021-05-06 DEVICE — PALACOS® R IS A FAST-CURING, RADIOPAQUE, POLY(METHYL METHACRYLATE)-BASED BONE CEMENT.PALACOS ® R CONTAINS THE X-RAY CONTRAST MEDIUM ZIRCONIUM DIOXIDE. TO IMPROVE VISIBILITY IN THE SURGICAL FIELD PALACOS ® R HAS BEEN COLOURED WITH CHLOROPHYLL (E141). THE BONE CEMENT IS PREPARED DIRECTLY BEFORE USE BY MIXING A POLYMER POWDER COMPONENT WITH A LIQUID MONOMER COMPONENT. A DUCTILE DOUGH FORMS WHICH CURES WITHIN A FEW MINUTES.
Type: IMPLANTABLE DEVICE | Site: KNEE | Status: FUNCTIONAL
Brand: PALACOS®

## 2021-05-06 DEVICE — GENESIS II NON-POROUS TIBIAL                                    BASEPLATE SIZE 6 RIGHT
Type: IMPLANTABLE DEVICE | Site: KNEE | Status: FUNCTIONAL
Brand: GENESIS II

## 2021-05-06 DEVICE — LEGION CRUCIATE RETAINING OXINIUM                                    FEMORAL SIZE 6 RIGHT
Type: IMPLANTABLE DEVICE | Site: KNEE | Status: FUNCTIONAL
Brand: LEGION

## 2021-05-06 DEVICE — GEN II 7.5MM RESUR PAT 35MM
Type: IMPLANTABLE DEVICE | Site: KNEE | Status: FUNCTIONAL
Brand: GENESIS II

## 2021-05-06 DEVICE — LEGION HIGHLY CROSS LINKED                                    POLYETHYLENE DISHED INSERT SIZE 5-6 13MM
Type: IMPLANTABLE DEVICE | Site: KNEE | Status: FUNCTIONAL
Brand: LEGION

## 2021-05-06 RX ORDER — TRANEXAMIC ACID 100 MG/ML
INJECTION, SOLUTION INTRAVENOUS AS NEEDED
Status: DISCONTINUED | OUTPATIENT
Start: 2021-05-06 | End: 2021-05-06 | Stop reason: SURG

## 2021-05-06 RX ORDER — SODIUM CHLORIDE 0.9 % (FLUSH) 0.9 %
3-10 SYRINGE (ML) INJECTION AS NEEDED
Status: DISCONTINUED | OUTPATIENT
Start: 2021-05-06 | End: 2021-05-06 | Stop reason: HOSPADM

## 2021-05-06 RX ORDER — MAGNESIUM HYDROXIDE 1200 MG/15ML
LIQUID ORAL AS NEEDED
Status: DISCONTINUED | OUTPATIENT
Start: 2021-05-06 | End: 2021-05-06 | Stop reason: HOSPADM

## 2021-05-06 RX ORDER — CEFAZOLIN SODIUM 2 G/100ML
2 INJECTION, SOLUTION INTRAVENOUS ONCE
Status: COMPLETED | OUTPATIENT
Start: 2021-05-06 | End: 2021-05-06

## 2021-05-06 RX ORDER — NALOXONE HCL 0.4 MG/ML
0.2 VIAL (ML) INJECTION AS NEEDED
Status: DISCONTINUED | OUTPATIENT
Start: 2021-05-06 | End: 2021-05-06 | Stop reason: HOSPADM

## 2021-05-06 RX ORDER — HYDROCODONE BITARTRATE AND ACETAMINOPHEN 7.5; 325 MG/1; MG/1
TABLET ORAL
Qty: 42 TABLET | Refills: 0 | Status: SHIPPED | OUTPATIENT
Start: 2021-05-06 | End: 2021-05-12 | Stop reason: SDUPTHER

## 2021-05-06 RX ORDER — NEOSTIGMINE METHYLSULFATE 0.5 MG/ML
INJECTION, SOLUTION INTRAVENOUS AS NEEDED
Status: DISCONTINUED | OUTPATIENT
Start: 2021-05-06 | End: 2021-05-06 | Stop reason: SURG

## 2021-05-06 RX ORDER — HYDROMORPHONE HYDROCHLORIDE 1 MG/ML
0.5 INJECTION, SOLUTION INTRAMUSCULAR; INTRAVENOUS; SUBCUTANEOUS
Status: DISCONTINUED | OUTPATIENT
Start: 2021-05-06 | End: 2021-05-06 | Stop reason: HOSPADM

## 2021-05-06 RX ORDER — DIPHENHYDRAMINE HYDROCHLORIDE 50 MG/ML
12.5 INJECTION INTRAMUSCULAR; INTRAVENOUS
Status: DISCONTINUED | OUTPATIENT
Start: 2021-05-06 | End: 2021-05-06 | Stop reason: HOSPADM

## 2021-05-06 RX ORDER — CEFAZOLIN SODIUM 2 G/100ML
INJECTION, SOLUTION INTRAVENOUS AS NEEDED
Status: DISCONTINUED | OUTPATIENT
Start: 2021-05-06 | End: 2021-05-06 | Stop reason: SURG

## 2021-05-06 RX ORDER — HYDROCODONE BITARTRATE AND ACETAMINOPHEN 7.5; 325 MG/1; MG/1
1 TABLET ORAL ONCE AS NEEDED
Status: DISCONTINUED | OUTPATIENT
Start: 2021-05-06 | End: 2021-05-06 | Stop reason: HOSPADM

## 2021-05-06 RX ORDER — HYDROCODONE BITARTRATE AND ACETAMINOPHEN 7.5; 325 MG/1; MG/1
2 TABLET ORAL EVERY 4 HOURS PRN
Status: DISCONTINUED | OUTPATIENT
Start: 2021-05-06 | End: 2021-05-06 | Stop reason: HOSPADM

## 2021-05-06 RX ORDER — FLUMAZENIL 0.1 MG/ML
0.2 INJECTION INTRAVENOUS AS NEEDED
Status: DISCONTINUED | OUTPATIENT
Start: 2021-05-06 | End: 2021-05-06 | Stop reason: HOSPADM

## 2021-05-06 RX ORDER — FENTANYL CITRATE 50 UG/ML
50 INJECTION, SOLUTION INTRAMUSCULAR; INTRAVENOUS
Status: DISCONTINUED | OUTPATIENT
Start: 2021-05-06 | End: 2021-05-06 | Stop reason: HOSPADM

## 2021-05-06 RX ORDER — MELOXICAM 7.5 MG/1
15 TABLET ORAL ONCE
Status: DISCONTINUED | OUTPATIENT
Start: 2021-05-06 | End: 2021-05-06 | Stop reason: HOSPADM

## 2021-05-06 RX ORDER — ACETAMINOPHEN 325 MG/1
1000 TABLET ORAL ONCE
Status: COMPLETED | OUTPATIENT
Start: 2021-05-06 | End: 2021-05-06

## 2021-05-06 RX ORDER — DEXAMETHASONE SODIUM PHOSPHATE 10 MG/ML
INJECTION INTRAMUSCULAR; INTRAVENOUS AS NEEDED
Status: DISCONTINUED | OUTPATIENT
Start: 2021-05-06 | End: 2021-05-06 | Stop reason: SURG

## 2021-05-06 RX ORDER — PROMETHAZINE HYDROCHLORIDE 25 MG/1
25 TABLET ORAL ONCE AS NEEDED
Status: DISCONTINUED | OUTPATIENT
Start: 2021-05-06 | End: 2021-05-06 | Stop reason: HOSPADM

## 2021-05-06 RX ORDER — SODIUM CHLORIDE 0.9 % (FLUSH) 0.9 %
3 SYRINGE (ML) INJECTION EVERY 12 HOURS SCHEDULED
Status: DISCONTINUED | OUTPATIENT
Start: 2021-05-06 | End: 2021-05-06 | Stop reason: HOSPADM

## 2021-05-06 RX ORDER — LABETALOL HYDROCHLORIDE 5 MG/ML
5 INJECTION, SOLUTION INTRAVENOUS
Status: DISCONTINUED | OUTPATIENT
Start: 2021-05-06 | End: 2021-05-06 | Stop reason: HOSPADM

## 2021-05-06 RX ORDER — LIDOCAINE HYDROCHLORIDE 20 MG/ML
INJECTION, SOLUTION INFILTRATION; PERINEURAL AS NEEDED
Status: DISCONTINUED | OUTPATIENT
Start: 2021-05-06 | End: 2021-05-06 | Stop reason: SURG

## 2021-05-06 RX ORDER — DOCUSATE SODIUM 100 MG/1
100 CAPSULE, LIQUID FILLED ORAL 2 TIMES DAILY
Qty: 60 CAPSULE | Refills: 0 | Status: SHIPPED | OUTPATIENT
Start: 2021-05-06 | End: 2021-06-16 | Stop reason: SDUPTHER

## 2021-05-06 RX ORDER — ROCURONIUM BROMIDE 10 MG/ML
INJECTION, SOLUTION INTRAVENOUS AS NEEDED
Status: DISCONTINUED | OUTPATIENT
Start: 2021-05-06 | End: 2021-05-06 | Stop reason: SURG

## 2021-05-06 RX ORDER — FAMOTIDINE 10 MG/ML
20 INJECTION, SOLUTION INTRAVENOUS ONCE
Status: COMPLETED | OUTPATIENT
Start: 2021-05-06 | End: 2021-05-06

## 2021-05-06 RX ORDER — SUCCINYLCHOLINE CHLORIDE 20 MG/ML
INJECTION INTRAMUSCULAR; INTRAVENOUS AS NEEDED
Status: DISCONTINUED | OUTPATIENT
Start: 2021-05-06 | End: 2021-05-06 | Stop reason: SURG

## 2021-05-06 RX ORDER — ONDANSETRON 2 MG/ML
4 INJECTION INTRAMUSCULAR; INTRAVENOUS ONCE AS NEEDED
Status: DISCONTINUED | OUTPATIENT
Start: 2021-05-06 | End: 2021-05-06 | Stop reason: HOSPADM

## 2021-05-06 RX ORDER — EPHEDRINE SULFATE 50 MG/ML
5 INJECTION, SOLUTION INTRAVENOUS ONCE AS NEEDED
Status: DISCONTINUED | OUTPATIENT
Start: 2021-05-06 | End: 2021-05-06 | Stop reason: HOSPADM

## 2021-05-06 RX ORDER — FENTANYL CITRATE 50 UG/ML
INJECTION, SOLUTION INTRAMUSCULAR; INTRAVENOUS AS NEEDED
Status: DISCONTINUED | OUTPATIENT
Start: 2021-05-06 | End: 2021-05-06 | Stop reason: SURG

## 2021-05-06 RX ORDER — ASPIRIN 81 MG/1
81 TABLET ORAL 2 TIMES DAILY
Qty: 60 TABLET | Refills: 1 | Status: SHIPPED | OUTPATIENT
Start: 2021-05-06

## 2021-05-06 RX ORDER — MIDAZOLAM HYDROCHLORIDE 1 MG/ML
1 INJECTION INTRAMUSCULAR; INTRAVENOUS
Status: DISCONTINUED | OUTPATIENT
Start: 2021-05-06 | End: 2021-05-06 | Stop reason: HOSPADM

## 2021-05-06 RX ORDER — ONDANSETRON 2 MG/ML
4 INJECTION INTRAMUSCULAR; INTRAVENOUS ONCE AS NEEDED
Status: COMPLETED | OUTPATIENT
Start: 2021-05-06 | End: 2021-05-06

## 2021-05-06 RX ORDER — GLYCOPYRROLATE 0.2 MG/ML
INJECTION INTRAMUSCULAR; INTRAVENOUS AS NEEDED
Status: DISCONTINUED | OUTPATIENT
Start: 2021-05-06 | End: 2021-05-06 | Stop reason: SURG

## 2021-05-06 RX ORDER — SODIUM CHLORIDE, SODIUM LACTATE, POTASSIUM CHLORIDE, CALCIUM CHLORIDE 600; 310; 30; 20 MG/100ML; MG/100ML; MG/100ML; MG/100ML
9 INJECTION, SOLUTION INTRAVENOUS CONTINUOUS PRN
Status: DISCONTINUED | OUTPATIENT
Start: 2021-05-06 | End: 2021-05-06 | Stop reason: HOSPADM

## 2021-05-06 RX ORDER — HYDROCODONE BITARTRATE AND ACETAMINOPHEN 7.5; 325 MG/1; MG/1
1 TABLET ORAL EVERY 4 HOURS PRN
Status: DISCONTINUED | OUTPATIENT
Start: 2021-05-06 | End: 2021-05-06 | Stop reason: HOSPADM

## 2021-05-06 RX ORDER — ONDANSETRON 4 MG/1
4 TABLET, FILM COATED ORAL ONCE AS NEEDED
Status: DISCONTINUED | OUTPATIENT
Start: 2021-05-06 | End: 2021-05-06 | Stop reason: HOSPADM

## 2021-05-06 RX ORDER — OXYCODONE AND ACETAMINOPHEN 7.5; 325 MG/1; MG/1
1 TABLET ORAL ONCE AS NEEDED
Status: DISCONTINUED | OUTPATIENT
Start: 2021-05-06 | End: 2021-05-06 | Stop reason: HOSPADM

## 2021-05-06 RX ORDER — PROMETHAZINE HYDROCHLORIDE 25 MG/1
25 SUPPOSITORY RECTAL ONCE AS NEEDED
Status: DISCONTINUED | OUTPATIENT
Start: 2021-05-06 | End: 2021-05-06 | Stop reason: HOSPADM

## 2021-05-06 RX ORDER — DIPHENHYDRAMINE HCL 25 MG
25 CAPSULE ORAL
Status: DISCONTINUED | OUTPATIENT
Start: 2021-05-06 | End: 2021-05-06 | Stop reason: HOSPADM

## 2021-05-06 RX ORDER — ONDANSETRON 4 MG/1
4 TABLET, FILM COATED ORAL EVERY 8 HOURS PRN
Qty: 12 TABLET | Refills: 0 | Status: SHIPPED | OUTPATIENT
Start: 2021-05-06

## 2021-05-06 RX ORDER — HYDROMORPHONE HCL 110MG/55ML
PATIENT CONTROLLED ANALGESIA SYRINGE INTRAVENOUS AS NEEDED
Status: DISCONTINUED | OUTPATIENT
Start: 2021-05-06 | End: 2021-05-06 | Stop reason: SURG

## 2021-05-06 RX ORDER — PROPOFOL 10 MG/ML
VIAL (ML) INTRAVENOUS AS NEEDED
Status: DISCONTINUED | OUTPATIENT
Start: 2021-05-06 | End: 2021-05-06 | Stop reason: SURG

## 2021-05-06 RX ORDER — PREGABALIN 75 MG/1
150 CAPSULE ORAL ONCE
Status: COMPLETED | OUTPATIENT
Start: 2021-05-06 | End: 2021-05-06

## 2021-05-06 RX ORDER — HYDROCODONE BITARTRATE AND ACETAMINOPHEN 7.5; 325 MG/1; MG/1
1 TABLET ORAL EVERY 4 HOURS PRN
Status: CANCELLED | OUTPATIENT
Start: 2021-05-06 | End: 2021-05-16

## 2021-05-06 RX ORDER — ONDANSETRON 2 MG/ML
INJECTION INTRAMUSCULAR; INTRAVENOUS AS NEEDED
Status: DISCONTINUED | OUTPATIENT
Start: 2021-05-06 | End: 2021-05-06 | Stop reason: SURG

## 2021-05-06 RX ORDER — ROPIVACAINE HYDROCHLORIDE 5 MG/ML
INJECTION, SOLUTION EPIDURAL; INFILTRATION; PERINEURAL
Status: COMPLETED | OUTPATIENT
Start: 2021-05-06 | End: 2021-05-06

## 2021-05-06 RX ADMIN — FENTANYL CITRATE 50 MCG: 50 INJECTION, SOLUTION INTRAMUSCULAR; INTRAVENOUS at 10:26

## 2021-05-06 RX ADMIN — NEOSTIGMINE METHYLSULFATE 2 MG: 0.5 INJECTION INTRAVENOUS at 09:26

## 2021-05-06 RX ADMIN — HYDROCODONE BITARTRATE AND ACETAMINOPHEN 2 TABLET: 7.5; 325 TABLET ORAL at 12:45

## 2021-05-06 RX ADMIN — SODIUM CHLORIDE, POTASSIUM CHLORIDE, SODIUM LACTATE AND CALCIUM CHLORIDE 9 ML/HR: 600; 310; 30; 20 INJECTION, SOLUTION INTRAVENOUS at 07:07

## 2021-05-06 RX ADMIN — GLYCOPYRROLATE 0.2 MG: 0.2 INJECTION INTRAMUSCULAR; INTRAVENOUS at 09:26

## 2021-05-06 RX ADMIN — ROCURONIUM BROMIDE 20 MG: 50 INJECTION INTRAVENOUS at 08:01

## 2021-05-06 RX ADMIN — SUCCINYLCHOLINE CHLORIDE 160 MG: 20 INJECTION, SOLUTION INTRAMUSCULAR; INTRAVENOUS; PARENTERAL at 07:57

## 2021-05-06 RX ADMIN — HYDROMORPHONE HYDROCHLORIDE 0.5 MG: 1 INJECTION, SOLUTION INTRAMUSCULAR; INTRAVENOUS; SUBCUTANEOUS at 10:29

## 2021-05-06 RX ADMIN — DEXAMETHASONE SODIUM PHOSPHATE 8 MG: 10 INJECTION INTRAMUSCULAR; INTRAVENOUS at 08:05

## 2021-05-06 RX ADMIN — HYDROMORPHONE HYDROCHLORIDE 0.25 MG: 2 INJECTION, SOLUTION INTRAMUSCULAR; INTRAVENOUS; SUBCUTANEOUS at 08:50

## 2021-05-06 RX ADMIN — HYDROMORPHONE HYDROCHLORIDE 0.5 MG: 2 INJECTION, SOLUTION INTRAMUSCULAR; INTRAVENOUS; SUBCUTANEOUS at 10:09

## 2021-05-06 RX ADMIN — CEFAZOLIN SODIUM 2 G: 2 INJECTION, SOLUTION INTRAVENOUS at 07:37

## 2021-05-06 RX ADMIN — MIDAZOLAM 1 MG: 1 INJECTION INTRAMUSCULAR; INTRAVENOUS at 06:40

## 2021-05-06 RX ADMIN — PROPOFOL 200 MG: 10 INJECTION, EMULSION INTRAVENOUS at 07:57

## 2021-05-06 RX ADMIN — ONDANSETRON 4 MG: 2 INJECTION INTRAMUSCULAR; INTRAVENOUS at 13:15

## 2021-05-06 RX ADMIN — FENTANYL CITRATE 50 MCG: 50 INJECTION, SOLUTION INTRAMUSCULAR; INTRAVENOUS at 10:35

## 2021-05-06 RX ADMIN — HYDROMORPHONE HYDROCHLORIDE 0.25 MG: 2 INJECTION, SOLUTION INTRAMUSCULAR; INTRAVENOUS; SUBCUTANEOUS at 08:43

## 2021-05-06 RX ADMIN — FAMOTIDINE 20 MG: 10 INJECTION INTRAVENOUS at 06:38

## 2021-05-06 RX ADMIN — LIDOCAINE HYDROCHLORIDE 80 MG: 20 INJECTION, SOLUTION INFILTRATION; PERINEURAL at 07:57

## 2021-05-06 RX ADMIN — FENTANYL CITRATE 50 MCG: 50 INJECTION, SOLUTION INTRAMUSCULAR; INTRAVENOUS at 06:40

## 2021-05-06 RX ADMIN — ONDANSETRON 4 MG: 2 INJECTION INTRAMUSCULAR; INTRAVENOUS at 09:23

## 2021-05-06 RX ADMIN — ROPIVACAINE HYDROCHLORIDE 30 ML: 5 INJECTION, SOLUTION EPIDURAL; INFILTRATION; PERINEURAL at 06:45

## 2021-05-06 RX ADMIN — VANCOMYCIN HYDROCHLORIDE 2000 MG: 10 INJECTION, POWDER, LYOPHILIZED, FOR SOLUTION INTRAVENOUS at 06:17

## 2021-05-06 RX ADMIN — SODIUM CHLORIDE, POTASSIUM CHLORIDE, SODIUM LACTATE AND CALCIUM CHLORIDE 500 ML: 600; 310; 30; 20 INJECTION, SOLUTION INTRAVENOUS at 05:59

## 2021-05-06 RX ADMIN — ACETAMINOPHEN 975 MG: 325 TABLET ORAL at 05:58

## 2021-05-06 RX ADMIN — TRANEXAMIC ACID 1000 MG: 1 INJECTION, SOLUTION INTRAVENOUS at 09:20

## 2021-05-06 RX ADMIN — FENTANYL CITRATE 50 MCG: 50 INJECTION INTRAMUSCULAR; INTRAVENOUS at 08:24

## 2021-05-06 RX ADMIN — CEFAZOLIN SODIUM 3 G: 2 INJECTION, SOLUTION INTRAVENOUS at 08:03

## 2021-05-06 RX ADMIN — PREGABALIN 150 MG: 75 CAPSULE ORAL at 05:58

## 2021-05-06 RX ADMIN — HYDROMORPHONE HYDROCHLORIDE 0.5 MG: 1 INJECTION, SOLUTION INTRAMUSCULAR; INTRAVENOUS; SUBCUTANEOUS at 10:38

## 2021-05-06 RX ADMIN — FENTANYL CITRATE 50 MCG: 50 INJECTION INTRAMUSCULAR; INTRAVENOUS at 07:57

## 2021-05-06 NOTE — ANESTHESIA PROCEDURE NOTES
Peripheral Block      Patient reassessed immediately prior to procedure    Patient location during procedure: holding area  Start time: 5/6/2021 6:43 AM  Stop time: 5/6/2021 6:46 AM  Reason for block: at surgeon's request and post-op pain management  Performed by  Anesthesiologist: Palomo Kirkpatrick MD  Preanesthetic Checklist  Completed: patient identified, IV checked, site marked, risks and benefits discussed, surgical consent, monitors and equipment checked, pre-op evaluation and timeout performed  Prep:  Sterile barriers:cap, gloves and mask  Prep: ChloraPrep  Patient monitoring: blood pressure monitoring, continuous pulse oximetry and EKG  Procedure  Sedation:yes    Guidance:ultrasound guided  ULTRASOUND INTERPRETATION.  Using ultrasound guidance a 21 G gauge needle was placed in close proximity to the femoral nerve, at which point, under ultrasound guidance anesthetic was injected in the area of the nerve and spread of the anesthesia was seen on ultrasound in close proximity thereto.  There were no abnormalities seen on ultrasound; a digital image was taken; and the patient tolerated the procedure with no complications. Images:still images obtained, printed/placed on chart    Laterality:right  Block Type:adductor canal block (Femoral Nerve at Adductor Canal)  Injection Technique:single-shot  Needle Type:short-bevel  Needle Gauge:21 G  Loss of resistance: normal.    Medications Used: ropivacaine (NAROPIN) 0.5 % injection, 30 mL  Med admintered at 5/6/2021 6:45 AM      Post Assessment  Injection Assessment: negative aspiration for heme, no paresthesia on injection and incremental injection  Patient Tolerance:comfortable throughout block  Complications:no

## 2021-05-06 NOTE — ANESTHESIA PREPROCEDURE EVALUATION
Anesthesia Evaluation     no history of anesthetic complications:  NPO Solid Status: > 8 hours  NPO Liquid Status: > 2 hours           Airway   Mallampati: II  Neck ROM: full  no difficulty expected  Comment: Full beard  Dental - normal exam     Pulmonary - normal exam   (+) pneumonia (2016, 2017) resolved , asthma (well controlled currently),sleep apnea on CPAP,   (-) COPD, not a smoker    PE comment: nonlabored  Cardiovascular - normal exam    Rhythm: regular  Rate: normal    (+) hypertension,   (-) valvular problems/murmurs, past MI, CAD, dysrhythmias, angina      Neuro/Psych- negative ROS  (-) seizures, TIA, CVA  GI/Hepatic/Renal/Endo    (+) morbid obesity, GERD well controlled,    (-) liver disease, no renal disease, diabetes, no thyroid disorder    Musculoskeletal     (+) arthralgias,   Abdominal    Substance History      OB/GYN          Other   arthritis,                      Anesthesia Plan    ASA 2     general with block   (AC block for post-op pain PSR)  intravenous induction     Anesthetic plan, all risks, benefits, and alternatives have been provided, discussed and informed consent has been obtained with: patient.

## 2021-05-06 NOTE — ANESTHESIA POSTPROCEDURE EVALUATION
Patient: Maurizio Mcnair    Procedure Summary     Date: 05/06/21 Room / Location: [facility] OR [address] [place] [address] MAIN OR    Anesthesia Start: 0750 Anesthesia Stop: 1016    Procedure: TOTAL KNEE ARTHROPLASTY (Right Knee) Diagnosis:       Arthritis of knee      (Arthritis of knee [M17.10])    Surgeons: Rodney Vega MD Provider: Palomo Kirkpatrick MD    Anesthesia Type: general with block ASA Status: 2          Anesthesia Type: general with block    Vitals  Vitals Value Taken Time   /91 05/06/21 1100   Temp 36.6 °C (97.8 °F) 05/06/21 1055   Pulse 72 05/06/21 1102   Resp 16 05/06/21 1045   SpO2 93 % 05/06/21 1102   Vitals shown include unvalidated device data.        Post Anesthesia Care and Evaluation    Patient location during evaluation: bedside  Pain management: adequate  Airway patency: patent  Anesthetic complications: No anesthetic complications    Cardiovascular status: acceptable  Respiratory status: acceptable  Hydration status: acceptable

## 2021-05-06 NOTE — ANESTHESIA PROCEDURE NOTES
Airway  Urgency: elective    Date/Time: 5/6/2021 7:59 AM  Airway not difficult    General Information and Staff    Patient location during procedure: OR    Indications and Patient Condition  Indications for airway management: airway protection    Preoxygenated: yes  MILS maintained throughout  Mask difficulty assessment: 3 - difficult mask (inadequate, unstable or two providers) +/- NMBA    Final Airway Details  Final airway type: endotracheal airway      Successful airway: ETT  Cuffed: yes   Successful intubation technique: direct laryngoscopy  Facilitating devices/methods: intubating stylet  Endotracheal tube insertion site: oral  Blade: Rigoberto  Blade size: 4  ETT size (mm): 7.5  Cormack-Lehane Classification: grade I - full view of glottis  Placement verified by: chest auscultation and capnometry   Measured from: lips  ETT/EBT  to lips (cm): 22  Number of attempts at approach: 1  Assessment: lips, teeth, and gum same as pre-op and atraumatic intubation

## 2021-05-07 ENCOUNTER — NURSE TRIAGE (OUTPATIENT)
Dept: CALL CENTER | Facility: HOSPITAL | Age: 56
End: 2021-05-07

## 2021-05-07 ENCOUNTER — TELEPHONE (OUTPATIENT)
Dept: ORTHOPEDIC SURGERY | Facility: CLINIC | Age: 56
End: 2021-05-07

## 2021-05-07 NOTE — TELEPHONE ENCOUNTER
Reason for Disposition  • Caller has already spoken with the PCP and has no further questions.    Additional Information  • Negative: Caller is angry or rude (e.g., hangs up, verbally abusive, yelling)  • Negative: Caller hangs up  • Negative: Caller has already spoken with another triager and has no further questions.  • Negative: Caller has already spoken with another triager or PCP AND has further questions AND triager able to answer questions.  • Negative: Busy signal.  First attempt to contact caller.  Follow-up call scheduled within 15 minutes.    Protocols used: NO CONTACT OR DUPLICATE CONTACT CALL-[facility]

## 2021-05-07 NOTE — TELEPHONE ENCOUNTER
Postop follow-up call.  I spoke to Mr. Mcnair.  Reports he is doing very well.  We discussed postop care instructions.  I encouraged him to call us with any questions or concerns.  He acknowledged understanding and appreciated the call.

## 2021-05-10 ENCOUNTER — TELEPHONE (OUTPATIENT)
Dept: ORTHOPEDIC SURGERY | Facility: HOSPITAL | Age: 56
End: 2021-05-10

## 2021-05-10 NOTE — TELEPHONE ENCOUNTER
Post op day 4  Discharge Instructions:  Ask patient about his or her discharge instructions  ?  Patient confirmed understanding   ?  Further instruction needed   What, if any, recommendations, teaching, or interventions did you provide? Click or tap here to enter text.  Health status:  Pain controlled No   Recommended interventions:  Yes  Incision/dressing status   ?  Clean without redness, drainage, odor  ?  Redness    ?  Drainage - color Click or tap here to enter text.  ?  Odor  NIGHAT - Green light blinking Choose an item.  Difficulties urination No  Last BM 5/9/2021 (if no BM by day 3-recommend OTC suppository or fleets enema)  Medications:  ?Medications reviewed with patient/family/caregiver  Patient taking medications as prescribed?   Yes  If not taking medications as prescribed, note specific medicine(s) and reason for each:  Click or tap here to enter text.  Hospital Follow Up Plan:  Follow up Appointment with Orthopedic surgeon:  ?Has f/u appointment                ?Scheduled f/u appointment  Home Care ordered at discharge?    Yes        Home Care started, or contact made?    Yes   If no, action taken: Click or tap here to enter text.  DME obtained/used in home?         Yes   Other information:  Pt stating that he is having a hard time sleeping only sleeping an hour or 2 at a time. He states that he has just been taking 1 pain pill at a time. Encouraged him to cont. the 2 if it 1 is not providing adequate pain relief. He was concerned about swelling and bruising that happened on Saturday after PT left. Explained this was normal and it has since gone down. Encouraged ice and elevation. No other questions/concerns at this time, my contact information was given to the patient if he had any questions at a later time. Verbalized understanding.

## 2021-05-11 ENCOUNTER — HOME HEALTH ADMISSION (OUTPATIENT)
Dept: HOME HEALTH SERVICES | Facility: HOME HEALTHCARE | Age: 56
End: 2021-05-11

## 2021-05-12 ENCOUNTER — NURSE TRIAGE (OUTPATIENT)
Dept: CALL CENTER | Facility: HOSPITAL | Age: 56
End: 2021-05-12

## 2021-05-12 ENCOUNTER — TELEPHONE (OUTPATIENT)
Dept: ORTHOPEDIC SURGERY | Facility: CLINIC | Age: 56
End: 2021-05-12

## 2021-05-12 DIAGNOSIS — M17.10 ARTHRITIS OF KNEE: ICD-10-CM

## 2021-05-12 RX ORDER — HYDROCODONE BITARTRATE AND ACETAMINOPHEN 7.5; 325 MG/1; MG/1
TABLET ORAL
Qty: 42 TABLET | Refills: 0 | Status: SHIPPED | OUTPATIENT
Start: 2021-05-12 | End: 2021-06-16

## 2021-05-12 NOTE — TELEPHONE ENCOUNTER
I spoke to Mr. Mcnair.  I apologized for not getting back with him yesterday.  I was out of the office and Dr. Vega was in surgery.  He reports that he is having significant difficulty with getting and rest at night.  His pain is well controlled with Norco during the day.  He tells me he has iced his entire leg last night and this seemed to help.  To help with sleep, I recommended he contact his PCP for a sleep or he may try over-the-counter remedies such as melatonin or Benadryl.  He reports he has tried Benadryl in the past for seasonal allergies and this seems to help him significantly with sleep.  He will give this a try.  I encouraged him to call us with any other questions or concerns going forward.  He acknowledged understanding and appreciated the call.

## 2021-05-12 NOTE — TELEPHONE ENCOUNTER
----- Message from Maurizio Mcnair sent at 5/12/2021  6:47 AM EDT -----  Regarding: Prescription Question  Contact: [phone]  I called your office twice yesterday. Both times I was assured you or someone would get back with me. I heard from no one. Another night of no sleep. I need either something stronger at night or something that will force sleep through the pain.

## 2021-05-12 NOTE — TELEPHONE ENCOUNTER
----- Message from Ileana Elizondo MA sent at 5/11/2021  8:37 AM EDT -----  Regarding: FW: Complaint  Contact: [phone]    ----- Message -----  From: Maurizio GABBIE Mcnair  Sent: 5/11/2021  12:37 AM EDT  To: [facility]  Subject: Complaint                                        The pain relievers I have are cutting it. I’ve tried everything I know to be able to get some relief and  sleep, and nothing is working. Thursday, Friday I was sleeping about 20 minutes before waking up in pain. Saturday, Sunday about an hour. Tonight is more of the same. I am nauseated with lack of sleep. I am not able to do my exercises on schedule because of pain and fatigue. I need help.

## 2021-05-12 NOTE — TELEPHONE ENCOUNTER
From: Maurizio Mcnair  To: Office of Rodney Vega MD  Sent: 5/12/2021 6:42 AM EDT  Subject: Medication Renewal Request    Refills have been requested for the following medications:     HYDROcodone-acetaminophen (NORCO) 7.5-325 MG per tablet [Rodney Vega MD]    Preferred pharmacy: [id number] [address] [phone] [facility] [phone] FX  Delivery method: Pickup

## 2021-05-13 RX ORDER — OXYCODONE AND ACETAMINOPHEN 10; 325 MG/1; MG/1
1 TABLET ORAL EVERY 4 HOURS PRN
Qty: 50 TABLET | Refills: 0 | Status: SHIPPED | OUTPATIENT
Start: 2021-05-13 | End: 2021-06-16

## 2021-05-13 NOTE — TELEPHONE ENCOUNTER
"Caller very frustrated that he is not able to sleep and his pain is worse at night.  He followed the instructions give by the physician's office to take benadryl and two pain pills tonight (only taking one every 4 hours during the day).  Pain is not under control.  Refuses to go to ED.  States he has been using ice faithfully.  Call ended by caller.    Reason for Disposition  • Sounds like a serious complication to the triager    Additional Information  • Negative: Sounds like a life-threatening emergency to the triager  • Negative: Chest pain  • Negative: Difficulty breathing  • Negative: Acting confused (e.g., disoriented, slurred speech) or excessively sleepy  • Negative: Surgical incision symptoms and questions  • Negative: [1] Discomfort (pain, burning or stinging) when passing urine AND [2] male  • Negative: [1] Discomfort (pain, burning or stinging) when passing urine AND [2] female  • Negative: Constipation  • Negative: New or worsening leg (calf, thigh) pain  • Negative: New or worsening leg swelling  • Negative: Dizziness is severe, or persists > 24 hours after surgery  • Negative: Pain, redness, swelling, or pus at IV Site  • Negative: Symptoms arising from use of a urinary catheter (Wood or Coude)  • Negative: Cast problems or questions  • Negative: Medication question  • Negative: [1] Widespread rash AND [2] bright red, sunburn-like  • Negative: [1] SEVERE headache AND [2] after spinal (epidural) anesthesia  • Negative: [1] Vomiting AND [2] persists > 4 hours  • Negative: [1] Vomiting AND [2] abdomen looks much more swollen than usual  • Negative: [1] Drinking very little AND [2] dehydration suspected (e.g., no urine > 12 hours, very dry mouth, very lightheaded)  • Negative: Patient sounds very sick or weak to the triager    Answer Assessment - Initial Assessment Questions  1. SYMPTOM: \"What's the main symptom you're concerned about?\" (e.g., pain, fever, vomiting)      Insomnia and pain  2. ONSET: " "\"When did insomnia  start?\"      Last week  3. SURGERY: \"What surgery was performed?\"      Knee replacement  4. DATE of SURGERY: \"When was surgery performed?\"       5/6  5. ANESTHESIA: \" What type of anesthesia did you have?\" (e.g., general, spinal, epidural, local)      unknown  6. PAIN: \"Is there any pain?\" If so, ask: \"How bad is it?\"  (Scale 1-10; or mild, moderate, severe)      Moderate to sever which is worse at night  7. FEVER: \"Do you have a fever?\" If so, ask: \"What is your temperature, how was it measured, and when did it start?\"      denies  8. VOMITING: \"Is there any vomiting?\" If yes, ask: \"How many times?\"      na  9. BLEEDING: \"Is there any bleeding?\" If so, ask: \"How much?\" and \"Where?\"      na  10. OTHER SYMPTOMS: \"Do you have any other symptoms?\" (e.g., drainage from wound, painful urination, constipation)        denies    Protocols used: POST-OP SYMPTOMS AND QUESTIONS-ADULT-AH      "

## 2021-05-14 ENCOUNTER — TELEPHONE (OUTPATIENT)
Dept: ORTHOPEDIC SURGERY | Facility: CLINIC | Age: 56
End: 2021-05-14

## 2021-05-14 NOTE — TELEPHONE ENCOUNTER
PT came to see patient today and advised patient to call about a very sore spot on the lateral side of right calf. Patient stated that he had a similar spot years ago and was told it was a verucose vein. Please advise

## 2021-05-18 ENCOUNTER — TELEPHONE (OUTPATIENT)
Dept: ORTHOPEDIC SURGERY | Facility: HOSPITAL | Age: 56
End: 2021-05-18

## 2021-05-18 NOTE — TELEPHONE ENCOUNTER
Called and spoke with Mr. Mcnair as he is almost 2 weeks SP RTK. He states that he is getting better. Pain was horrible for about 5 days but pain medication got changed and he is either used to the pain or it is getting some better. He stated that he had quite a bot of swelling but some of that has started to subside. He is using his ice religiously which seems to help. He is starting to be able to do more of his exercises as the pain and swelling has decreased, but he still feels like he is behind on his PT and where he feels like he should be. He does state that B/B are functioning normally. He is still taking the colace. He has an appt to see MD tomorrow. No questions/concerns for me at this time. Mr Mcnair has my contact information should he need anything.

## 2021-05-19 ENCOUNTER — OFFICE VISIT (OUTPATIENT)
Dept: ORTHOPEDIC SURGERY | Facility: CLINIC | Age: 56
End: 2021-05-19

## 2021-05-19 VITALS — WEIGHT: 305 LBS | HEIGHT: 71 IN | TEMPERATURE: 96.6 F | BODY MASS INDEX: 42.7 KG/M2

## 2021-05-19 DIAGNOSIS — G89.29 CHRONIC PAIN OF RIGHT KNEE: Primary | ICD-10-CM

## 2021-05-19 DIAGNOSIS — M25.561 CHRONIC PAIN OF RIGHT KNEE: Primary | ICD-10-CM

## 2021-05-19 DIAGNOSIS — Z09 SURGERY FOLLOW-UP: ICD-10-CM

## 2021-05-19 PROCEDURE — 73562 X-RAY EXAM OF KNEE 3: CPT | Performed by: ORTHOPAEDIC SURGERY

## 2021-05-19 PROCEDURE — 99024 POSTOP FOLLOW-UP VISIT: CPT | Performed by: ORTHOPAEDIC SURGERY

## 2021-05-19 RX ORDER — OXYCODONE AND ACETAMINOPHEN 7.5; 325 MG/1; MG/1
1 TABLET ORAL EVERY 4 HOURS PRN
Qty: 50 TABLET | Refills: 0 | Status: SHIPPED | OUTPATIENT
Start: 2021-05-19 | End: 2021-06-03 | Stop reason: SDUPTHER

## 2021-05-19 NOTE — PROGRESS NOTES
Maurizio Mcnair     : 1965     MRN: [id number]     DATE: 2021    DIAGNOSIS: Follow-up 2 weeks status post total knee arthroplasty    SUBJECTIVE:  Patient returns today for 2 week follow up of recent knee replacement. Patient reports doing well with no unusual complaints.  He had pain control issues last week but things are better now.  Patient reports compliance with the anticoagulation.    OBJECTIVE:     Exam: The incision is healing appropriately.  No sign of infection.  Range of motion is from 5-85.  The calf is soft and nontender with a negative Homans sign.    DIAGNOSTIC STUDIES     Xrays: AP and lateral views of the right knee were ordered and reviewed for evaluation of recent knee replacement. They demonstrate a well positioned, well aligned knee replacement without complicating factors noted. In comparison with previous films there has been interval implant placement.    ASSESSMENT: 2 week status post right knee replacement.    PLAN:   1) Order given for outpatient PT   2) Continue to ice as needed.   3) Continue anticoagulation as discussed   4) Follow-up in 6 weeks     Rodney Vega MD    2021

## 2021-05-20 ENCOUNTER — TREATMENT (OUTPATIENT)
Dept: PHYSICAL THERAPY | Facility: CLINIC | Age: 56
End: 2021-05-20

## 2021-05-20 DIAGNOSIS — R26.2 DIFFICULTY WALKING: ICD-10-CM

## 2021-05-20 DIAGNOSIS — Z96.651 PRESENCE OF ARTIFICIAL KNEE JOINT, RIGHT: Primary | ICD-10-CM

## 2021-05-20 PROCEDURE — 97110 THERAPEUTIC EXERCISES: CPT | Performed by: PHYSICAL THERAPIST

## 2021-05-20 PROCEDURE — 97161 PT EVAL LOW COMPLEX 20 MIN: CPT | Performed by: PHYSICAL THERAPIST

## 2021-05-20 NOTE — PROGRESS NOTES
Physical Therapy Initial Evaluation and Plan of Care        Patient: Maurizio Mcnair   : 1965  Diagnosis/ICD-10 Code:  Presence of artificial knee joint, right [Z96.651]  Referring practitioner: Rodney Vega MD  Date of Initial Visit: 2021  Today's Date: 2021  Patient seen for 1 sessions           Subjective Questionnaire: LEFS:       Subjective Evaluation    History of Present Illness  Mechanism of injury: Froy is a 55 year old male who presents with R knee pain s/p R TKA by Dr Vega on 2021. He underwent PT and surgery for a torn meniscus in the same knee and failed conservative treatment. He is ambulating with a rolling walker, but can safely ambulate short distances without an AD. He had home health PT for 2 weeks, and saw Dr Vega this past week who is pleased with his progress for now, encouraged him to try to gain one degree of knee flexion per day. He returns to MD in about 4 weeks. He is taking pain meds as prescribed which is helping with his pain.    Pain  Current pain ratin  At worst pain ratin  Quality: sharp  Aggravating factors: ambulation    Treatments  Current treatment: physical therapy  Patient Goals  Patient goals for therapy: independence with ADLs/IADLs and decreased pain             Objective          Active Range of Motion     Right Knee   Flexion: 80 degrees with pain  Extension: 7 degrees with pain    Passive Range of Motion     Right Knee   Flexion: 87 degrees   Extension: 4 degrees     Swelling     Left Knee Girth Measurement (cm)   Joint line: 45 cm    Right Knee Girth Measurement (cm)   Joint line: 49 cm    Ambulation   Weight-Bearing Status   Weight-Bearing Status (Right): weight-bearing as tolerated    Assistive device used: front-wheeled walker    Ambulation: Level Surfaces   Ambulation with assistive device: independent          Assessment & Plan     Assessment  Impairments: abnormal gait, abnormal muscle firing, abnormal or restricted  ROM, impaired balance, impaired physical strength, lacks appropriate home exercise program and pain with function  Assessment details: Pt exhibits the following limitations: knee ROM deficits, compensations in gait, R LE strength deficits, pain with function and inability to perform his ADLs independently. Pt's signs and symptoms are consistent with referring diagnosis. Pt would benefit from additional skilled therapy in order to address the aforementioned problems and return to prior level of functioning with minimal functional limitations.  Prognosis: good  Functional Limitations: walking, sitting, standing and stooping  Goals  Plan Goals: Short Term Goals (achieve in 4 weeks):  1. Pt will report current pain decreased to 3/10.  2. Pt will be I with HEP.  3. Pt will ambulate with no assistive device and exhibit no compensations in gait.  4. Pt will increase AROM knee flexion to 100 degrees and AROM extension to 0 in order to ascend/descend stairs with minimal compensations.  Long Term Goals (achieve in 6-8 weeks):  5. Pt will exhibit knee flexion and extension strength increase to 4+/5 to exhibit functional gait and effective transfers.  6. Pt will increase single leg balance on affected leg to greater than 10 seconds with no handheld support to improve gait efficiency and dynamic balance.  7. Pt will report an LEFS score of 50 or greater to indicate decreased functional limitations.    Plan  Therapy options: will be seen for skilled physical therapy services  Planned modality interventions: cryotherapy and TENS  Planned therapy interventions: ADL retraining, balance/weight-bearing training, flexibility, functional ROM exercises, gait training, home exercise program, IADL retraining, manual therapy, neuromuscular re-education, soft tissue mobilization, strengthening, stretching and therapeutic activities  Frequency: 3x week  Duration in weeks: 12  Treatment plan discussed with: patient        Visit Diagnoses:     ICD-10-CM ICD-9-CM   1. Presence of artificial knee joint, right  Z96.651 V43.65   2. Difficulty walking  R26.2 719.7       Timed:  Manual Therapy:         mins  37388;  Therapeutic Exercise:    15     mins  35142;     Neuromuscular Cassandra:        mins  35399;    Therapeutic Activity:          mins  22387;     Gait Training:           mins  30844;     Ultrasound:          mins  41689;    Electrical Stimulation:         mins  39359 ( );    Untimed:  Electrical Stimulation:         mins  61956 ( );  Mechanical Traction:         mins  76535;     Timed Treatment:   15   mins   Total Treatment:     45   mins    PT SIGNATURE: Abdi Smalls PT, DPT, OCS  DATE TREATMENT INITIATED: 5/20/2021      Initial Certification  Certification Period: 8/18/2021  I certify that the therapy services are furnished while this patient is under my care.  The services outlined above are required by this patient, and will be reviewed every 90 days.     PHYSICIAN: Rodney Vega MD      DATE:     Please sign and return via fax to [phone].. Thank you, [facility].

## 2021-05-24 ENCOUNTER — TREATMENT (OUTPATIENT)
Dept: PHYSICAL THERAPY | Facility: CLINIC | Age: 56
End: 2021-05-24

## 2021-05-24 DIAGNOSIS — Z96.651 PRESENCE OF ARTIFICIAL KNEE JOINT, RIGHT: Primary | ICD-10-CM

## 2021-05-24 DIAGNOSIS — R26.2 DIFFICULTY WALKING: ICD-10-CM

## 2021-05-24 PROCEDURE — 97140 MANUAL THERAPY 1/> REGIONS: CPT | Performed by: PHYSICAL THERAPIST

## 2021-05-24 PROCEDURE — 97110 THERAPEUTIC EXERCISES: CPT | Performed by: PHYSICAL THERAPIST

## 2021-05-24 PROCEDURE — 97530 THERAPEUTIC ACTIVITIES: CPT | Performed by: PHYSICAL THERAPIST

## 2021-05-24 PROCEDURE — 97014 ELECTRIC STIMULATION THERAPY: CPT | Performed by: PHYSICAL THERAPIST

## 2021-05-26 ENCOUNTER — TREATMENT (OUTPATIENT)
Dept: PHYSICAL THERAPY | Facility: CLINIC | Age: 56
End: 2021-05-26

## 2021-05-26 DIAGNOSIS — R26.2 DIFFICULTY WALKING: ICD-10-CM

## 2021-05-26 DIAGNOSIS — M25.561 CHRONIC PAIN OF RIGHT KNEE: ICD-10-CM

## 2021-05-26 DIAGNOSIS — G89.29 CHRONIC PAIN OF RIGHT KNEE: ICD-10-CM

## 2021-05-26 DIAGNOSIS — Z96.651 PRESENCE OF ARTIFICIAL KNEE JOINT, RIGHT: Primary | ICD-10-CM

## 2021-05-26 PROCEDURE — 97110 THERAPEUTIC EXERCISES: CPT | Performed by: PHYSICAL THERAPIST

## 2021-05-26 PROCEDURE — 97530 THERAPEUTIC ACTIVITIES: CPT | Performed by: PHYSICAL THERAPIST

## 2021-05-26 PROCEDURE — 97140 MANUAL THERAPY 1/> REGIONS: CPT | Performed by: PHYSICAL THERAPIST

## 2021-05-26 NOTE — PROGRESS NOTES
Physical Therapy Daily Treatment Note      Patient: Maurizio Mcnair   : 1965  Referring practitioner: Rodney Vega MD  Date of Initial Visit: Type: THERAPY  Noted: 2021  Today's Date: 2021  Patient seen for 2 sessions         Maurizio Mcnair reports: difficulty performing heel slide at home.  Right knee is still swollen and uncomfortable.        Subjective     Objective   -ambulates without assistive device with noted antalgia and decreased active right knee flex/ext as well as decreased step/stride length vs. Left.  -diffuse edema surrounding right knee  AROM right knee flexion 87 deg;  AROM right knee flexion post stretching 92 deg  AROM right knee extension -12 deg    See Exercise, Manual, and Modality Logs for complete treatment.   Exercise rationale/ pain free exercise performance  Anatomy and structure of affected musculature  Ice application -frequent and often  Benefits of assistive device - 1 crutch ambulation to improve gait  Alternate exercise positions  Verbal/Tactile cues to ensure correct exercise performance/technique    Added: hip flexor/quad stretch over table, nustep, recumbent bike 1/2 revolutions, quad set with heel prop, SAQ      Assessment/Plan  Presents with R LE antalgia, diffuse swelling right knee joint and decreased quad contraction/strength.  Positive response with manual and modality intervention with decreased pain complaints, tightness and subsequently improved right knee flexion ROM.  Deficits still present in regards to flexion/ext.  Should continue to improve with rehab efforts.        Progress strengthening /stabilization /functional activity as symptoms allow.  Continue manual efforts to improve right knee ROM           Timed:  Manual Therapy:   15    mins  48936;  Therapeutic Exercise:     20    mins  72571;     Neuromuscular Cassandra:        mins  95628;    Therapeutic Activity:    14      mins  97521;     Gait Training:           mins  86836;      Ultrasound:          mins  05509;      Untimed:  Electrical Stimulation:   12      mins  61728 ( );  Mechanical Traction:         mins  85378;     Timed Treatment:  49    mins   Total Treatment:    61    mins  [facility]  Physical Therapist  Assistant  [id number]

## 2021-05-26 NOTE — PROGRESS NOTES
Physical Therapy Daily Progress Note    Visit # : 3  Maurizio Mcnair reports: I've been working on rubbing my knee down at home.     Subjective     Objective          Strength/Myotome Testing     Right Knee   Quadriceps contraction: poor    Ambulation   Weight-Bearing Status   Ambulation assistive devices: crutch.      See Exercise, Manual, and Modality Logs for complete treatment.     Assessment/Plan  Pt continues to exhibit deficits in ROM and quad control impacting gait.  Pt encouraged to continue use of crutch.  Pt was able to perform SAQ in full range today without assistance.  E stim deferred today due to transportation  Progress strengthening /stabilization /functional activity       Timed:  Manual Therapy:    15     mins  35086;  Therapeutic Exercise:    33     mins  37959;     Neuromuscular Cassandra:    -    mins  03017;    Therapeutic Activity:     10     mins  43823;     Gait Training:      -     mins  19985;     Ultrasound:     -     mins  26710;    Iontophoresis                 -     mins 06576    Timed Treatment:   58   mins   Total Treatment:     68   mins      Criselda Pride PT  Physical Therapist  [place] License # [id number]

## 2021-05-28 ENCOUNTER — TREATMENT (OUTPATIENT)
Dept: PHYSICAL THERAPY | Facility: CLINIC | Age: 56
End: 2021-05-28

## 2021-05-28 DIAGNOSIS — G89.29 CHRONIC PAIN OF RIGHT KNEE: ICD-10-CM

## 2021-05-28 DIAGNOSIS — M25.561 CHRONIC PAIN OF RIGHT KNEE: ICD-10-CM

## 2021-05-28 DIAGNOSIS — R26.2 DIFFICULTY WALKING: ICD-10-CM

## 2021-05-28 DIAGNOSIS — Z96.651 PRESENCE OF ARTIFICIAL KNEE JOINT, RIGHT: Primary | ICD-10-CM

## 2021-05-28 PROCEDURE — 97110 THERAPEUTIC EXERCISES: CPT | Performed by: PHYSICAL THERAPIST

## 2021-05-28 PROCEDURE — 97530 THERAPEUTIC ACTIVITIES: CPT | Performed by: PHYSICAL THERAPIST

## 2021-05-28 PROCEDURE — 97140 MANUAL THERAPY 1/> REGIONS: CPT | Performed by: PHYSICAL THERAPIST

## 2021-05-28 NOTE — PROGRESS NOTES
Physical Therapy Daily Treatment Note      Patient: Maurizio Mcnair   : 1965  Referring practitioner: Rodney Vega MD  Date of Initial Visit: Type: THERAPY  Noted: 2021  Today's Date: 2021  Patient seen for 4 sessions         Maurizio Mcnair reports:he's been able to cut back on his pain meds recently, he's compliant with icing and his HEP.    Subjective     Objective   See Exercise, Manual, and Modality Logs for complete treatment.       Assessment/Plan  Froy's R knee AROM flexion improved to 96 degrees today and it is not as painful. He can tolerate leg press with no increase in pain but he cannot achieve full terminal knee extension in stance phase of gait.  Visit Diagnoses:    ICD-10-CM ICD-9-CM   1. Presence of artificial knee joint, right  Z96.651 V43.65   2. Difficulty walking  R26.2 719.7   3. Chronic pain of right knee  M25.561 719.46    G89.29 338.29       Progress per Plan of Care           Timed:  Manual Therapy:    10     mins  73077;  Therapeutic Exercise:    10     mins  02256;     Neuromuscular Cassandra:        mins  21008;    Therapeutic Activity:     25     mins  06931;     Gait Training:           mins  26113;     Ultrasound:          mins  69649;    Electrical Stimulation:         mins  33257 ( );    Untimed:  Electrical Stimulation:         mins  09786 ( );  Mechanical Traction:         mins  08971;     Timed Treatment:   45   mins   Total Treatment:     45   mins  Abdi Smalls PT, DPT, OCS  Physical Therapist

## 2021-06-02 ENCOUNTER — TREATMENT (OUTPATIENT)
Dept: PHYSICAL THERAPY | Facility: CLINIC | Age: 56
End: 2021-06-02

## 2021-06-02 DIAGNOSIS — Z96.651 PRESENCE OF ARTIFICIAL KNEE JOINT, RIGHT: Primary | ICD-10-CM

## 2021-06-02 DIAGNOSIS — R26.2 DIFFICULTY WALKING: ICD-10-CM

## 2021-06-02 PROCEDURE — 97110 THERAPEUTIC EXERCISES: CPT | Performed by: PHYSICAL THERAPIST

## 2021-06-02 PROCEDURE — 97116 GAIT TRAINING THERAPY: CPT | Performed by: PHYSICAL THERAPIST

## 2021-06-02 PROCEDURE — 97140 MANUAL THERAPY 1/> REGIONS: CPT | Performed by: PHYSICAL THERAPIST

## 2021-06-02 NOTE — PROGRESS NOTES
Physical Therapy Daily Treatment Note      Patient: Maurizio Mcnair   : 1965  Referring practitioner: Rodney Vega MD  Date of Initial Visit: Type: THERAPY  Noted: 2021  Today's Date: 2021  Patient seen for 5 sessions         Maurizio Mcnair reports: right knee stiff today(potentially weather related).  Still not sleeping well(1-1.5 hours in a row).  Trying to wean off pain meds        Subjective     Objective   -ambulates in/out of clinic with 1 crutch,  Noted antalgia R LE with decreased hip/knee flexion and ext along with subsequent decrease in step/stride length     See Exercise, Manual, and Modality Logs for complete treatment.   -discussed efforts to improve ext with static stretch without support during ice application.    -hs and calf stretch R LE on stair module; NuStep x 5 min post manual x 5 min    Gait training x 10 min up/down 60 foot walking track with 1 crutch ambulation x 6 loops; stair module ascending with progression to reciprocal gait; descending with step to with practice step over/down on last 6 inch step.        Assessment/Plan  Right knee ROM and gait improves post exercise(ROM and stretching) and manual therapeutic interventions/techniques.  Continues to exhibit ROM deficits right knee from full with associated  isolated mm strength and control.  Complaint/cooperative with rehab efforts.         Continue manual interventions and strengthening exercise regimen to improve knee mobility and strength.           Timed:  Manual Therapy:   25      mins  16790;  Therapeutic Exercise:   25      mins  93850;     Neuromuscular Cassandra:        mins  09358;    Therapeutic Activity:          mins  29321;     Gait Training:     10      mins  57135;     Ultrasound:          mins  01194;      Untimed:  Electrical Stimulation:         mins  86803 ( );  Mechanical Traction:         mins  63937;     Timed Treatment: 60     mins   Total Treatment:   60     mins  Shine DALTON  [place], [facility]  Physical Therapist  Assistant  [id number]

## 2021-06-03 DIAGNOSIS — Z09 SURGERY FOLLOW-UP: ICD-10-CM

## 2021-06-03 RX ORDER — OXYCODONE AND ACETAMINOPHEN 7.5; 325 MG/1; MG/1
1 TABLET ORAL EVERY 4 HOURS PRN
Qty: 50 TABLET | Refills: 0 | Status: SHIPPED | OUTPATIENT
Start: 2021-06-03 | End: 2021-06-16

## 2021-06-04 ENCOUNTER — TREATMENT (OUTPATIENT)
Dept: PHYSICAL THERAPY | Facility: CLINIC | Age: 56
End: 2021-06-04

## 2021-06-04 DIAGNOSIS — M25.561 CHRONIC PAIN OF RIGHT KNEE: ICD-10-CM

## 2021-06-04 DIAGNOSIS — R26.2 DIFFICULTY WALKING: ICD-10-CM

## 2021-06-04 DIAGNOSIS — Z96.651 PRESENCE OF ARTIFICIAL KNEE JOINT, RIGHT: Primary | ICD-10-CM

## 2021-06-04 DIAGNOSIS — G89.29 CHRONIC PAIN OF RIGHT KNEE: ICD-10-CM

## 2021-06-04 PROCEDURE — 97530 THERAPEUTIC ACTIVITIES: CPT | Performed by: PHYSICAL THERAPIST

## 2021-06-04 PROCEDURE — 97110 THERAPEUTIC EXERCISES: CPT | Performed by: PHYSICAL THERAPIST

## 2021-06-04 PROCEDURE — 97140 MANUAL THERAPY 1/> REGIONS: CPT | Performed by: PHYSICAL THERAPIST

## 2021-06-04 NOTE — PROGRESS NOTES
Physical Therapy Daily Treatment Note      Patient: Maurizio Mcnair   : 1965  Referring practitioner: Rodney Vega MD  Date of Initial Visit: Type: THERAPY  Noted: 2021  Today's Date: 2021  Patient seen for 6 sessions         Maurizio Mcnair reports: no new complaints, he's compliant with his HEP and elevating and icing at home. He was trying to wean himself off the pain meds but he was very sore after last PT visit so he is back on his pain meds which are helping a lot.      Subjective     Objective          Active Range of Motion     Right Knee   Flexion: 98 degrees with pain  Extension: 5 degrees       See Exercise, Manual, and Modality Logs for complete treatment.       Assessment/Plan  Froy's DEVANTE knee flexion was measured at 98 degrees today, he continues to exhibit knee extension AROM about 5 degrees away from neutral.   Visit Diagnoses:    ICD-10-CM ICD-9-CM   1. Presence of artificial knee joint, right  Z96.651 V43.65   2. Difficulty walking  R26.2 719.7   3. Chronic pain of right knee  M25.561 719.46    G89.29 338.29       Progress per Plan of Care           Timed:  Manual Therapy:    8     mins  40291;  Therapeutic Exercise:    10     mins  37070;     Neuromuscular Cassandra:        mins  48247;    Therapeutic Activity:     25     mins  04420;     Gait Training:           mins  62426;     Ultrasound:          mins  83436;    Electrical Stimulation:         mins  04867 ( );    Untimed:  Electrical Stimulation:         mins  02504 ( );  Mechanical Traction:         mins  28444;     Timed Treatment:   43   mins   Total Treatment:     43   mins  Abdi Smalls PT, DPT, OCS  Physical Therapist

## 2021-06-07 ENCOUNTER — TREATMENT (OUTPATIENT)
Dept: PHYSICAL THERAPY | Facility: CLINIC | Age: 56
End: 2021-06-07

## 2021-06-07 DIAGNOSIS — Z96.651 PRESENCE OF ARTIFICIAL KNEE JOINT, RIGHT: Primary | ICD-10-CM

## 2021-06-07 DIAGNOSIS — M25.561 CHRONIC PAIN OF RIGHT KNEE: ICD-10-CM

## 2021-06-07 DIAGNOSIS — R26.2 DIFFICULTY WALKING: ICD-10-CM

## 2021-06-07 DIAGNOSIS — G89.29 CHRONIC PAIN OF RIGHT KNEE: ICD-10-CM

## 2021-06-07 PROCEDURE — 97140 MANUAL THERAPY 1/> REGIONS: CPT | Performed by: PHYSICAL THERAPIST

## 2021-06-07 PROCEDURE — 97530 THERAPEUTIC ACTIVITIES: CPT | Performed by: PHYSICAL THERAPIST

## 2021-06-07 PROCEDURE — 97110 THERAPEUTIC EXERCISES: CPT | Performed by: PHYSICAL THERAPIST

## 2021-06-07 NOTE — PROGRESS NOTES
Physical Therapy Daily Progress Note    Patient: Maurizio Mcnair   : 1965  Diagnosis/ICD-10 Code:  Presence of artificial knee joint, right [Z96.651]  Referring practitioner: Rodney Vega MD  Date of Initial Visit: Type: THERAPY  Noted: 2021  Today's Date: 2021  Patient seen for 7 sessions         Maurizio Mcnair reports: no new complaints.    Subjective     Objective   See Exercise, Manual, and Modality Logs for complete treatment.       Assessment/Plan  Subjectively, pt reports no increase of pain or discomfort with interventions performed today. Performed well with continued focus on increasing functional ROM. Continues to demonstrate limited knee ROM that is continuing to improve with ability to complete full revolution on recumbent bike with minor compensations. Continues to benefit from verbal/tactile cues to ensure proper form and technique for exercise performance.     Progress per Plan of Care           Manual Therapy:    12     mins  64197;  Therapeutic Exercise:    24     mins  86039;     Neuromuscular Cassandra:        mins  18970;    Therapeutic Activity:     10     mins  38836;     Gait Training:           mins  70438;     Ultrasound:          mins  21196;    Electrical Stimulation:         mins  95371 ( );  Dry Needling          mins self-pay    Timed Treatment:   46   mins   Total Treatment:     56   mins    Candace Franks PTA  Physical Therapist Assistant [id number]

## 2021-06-09 ENCOUNTER — TELEPHONE (OUTPATIENT)
Dept: PHYSICAL THERAPY | Facility: CLINIC | Age: 56
End: 2021-06-09

## 2021-06-09 ENCOUNTER — TREATMENT (OUTPATIENT)
Dept: PHYSICAL THERAPY | Facility: CLINIC | Age: 56
End: 2021-06-09

## 2021-06-09 DIAGNOSIS — R26.2 DIFFICULTY WALKING: ICD-10-CM

## 2021-06-09 DIAGNOSIS — M25.561 CHRONIC PAIN OF RIGHT KNEE: ICD-10-CM

## 2021-06-09 DIAGNOSIS — G89.29 CHRONIC PAIN OF RIGHT KNEE: ICD-10-CM

## 2021-06-09 DIAGNOSIS — Z96.651 PRESENCE OF ARTIFICIAL KNEE JOINT, RIGHT: Primary | ICD-10-CM

## 2021-06-09 PROCEDURE — 97530 THERAPEUTIC ACTIVITIES: CPT | Performed by: PHYSICAL THERAPIST

## 2021-06-09 PROCEDURE — 97110 THERAPEUTIC EXERCISES: CPT | Performed by: PHYSICAL THERAPIST

## 2021-06-09 PROCEDURE — 97140 MANUAL THERAPY 1/> REGIONS: CPT | Performed by: PHYSICAL THERAPIST

## 2021-06-09 NOTE — PROGRESS NOTES
Physical Therapy Daily Progress Note    Patient: Maurizio Mcnair   : 1965  Diagnosis/ICD-10 Code:  Presence of artificial knee joint, right [Z96.651]  Referring practitioner: Rodney Vega MD  Date of Initial Visit: Type: THERAPY  Noted: 2021  Today's Date: 2021  Patient seen for 8 sessions         Maurizio Mcnair reports: Sore form recumbent bike last session but was able to sleep this morning for about 3 hours.     Subjective     Objective   See Exercise, Manual, and Modality Logs for complete treatment.       Assessment/Plan  Subjectively, pt reports no increase of pain or discomfort with interventions performed today. Performed well with continued mobility and strengthening interventions. Continues to demonstrate antalgic gait with decreased step length and knee flexion but improved with gait training activity. Continues to benefit from verbal/tactile cues to ensure proper form and technique for exercise performance.     Progress per Plan of Care           Manual Therapy:    10     mins  50366;  Therapeutic Exercise:    26     mins  34616;     Neuromuscular Cassandra:        mins  14443;    Therapeutic Activity:     10     mins  83536;     Gait Training:           mins  10795;     Ultrasound:          mins  00971;    Electrical Stimulation:         mins  81423 ( );  Dry Needling          mins self-pay    Timed Treatment:   46   mins   Total Treatment:     56   mins    Candace Franks PTA  Physical Therapist Assistant [id number]

## 2021-06-11 ENCOUNTER — TREATMENT (OUTPATIENT)
Dept: PHYSICAL THERAPY | Facility: CLINIC | Age: 56
End: 2021-06-11

## 2021-06-11 DIAGNOSIS — R26.2 DIFFICULTY WALKING: ICD-10-CM

## 2021-06-11 DIAGNOSIS — M25.561 CHRONIC PAIN OF RIGHT KNEE: ICD-10-CM

## 2021-06-11 DIAGNOSIS — G89.29 CHRONIC PAIN OF RIGHT KNEE: ICD-10-CM

## 2021-06-11 DIAGNOSIS — Z96.651 PRESENCE OF ARTIFICIAL KNEE JOINT, RIGHT: Primary | ICD-10-CM

## 2021-06-11 PROCEDURE — 97530 THERAPEUTIC ACTIVITIES: CPT | Performed by: PHYSICAL THERAPIST

## 2021-06-11 PROCEDURE — 97110 THERAPEUTIC EXERCISES: CPT | Performed by: PHYSICAL THERAPIST

## 2021-06-11 NOTE — PROGRESS NOTES
Physical Therapy Daily Treatment Note      Patient: Maurizio Mcnair   : 1965  Referring practitioner: Rodney Vega MD  Date of Initial Visit: Type: THERAPY  Noted: 2021  Today's Date: 2021  Patient seen for 9 sessions         Maurizio Mcnair reports: he is still struggling to sleep well, but he's been very compliant with HEP at home.      Subjective     Objective   See Exercise, Manual, and Modality Logs for complete treatment.       Assessment/Plan  Froy exhibits AROM R knee flexion to 96 degrees today. He continues to guard heavily during PROM stretching but makes small improvements each visit.  Visit Diagnoses:    ICD-10-CM ICD-9-CM   1. Presence of artificial knee joint, right  Z96.651 V43.65   2. Difficulty walking  R26.2 719.7   3. Chronic pain of right knee  M25.561 719.46    G89.29 338.29       Progress per Plan of Care           Timed:  Manual Therapy:         mins  05512;  Therapeutic Exercise:    15     mins  63067;     Neuromuscular Cassandra:        mins  45395;    Therapeutic Activity:     30    mins  71261;     Gait Training:           mins  89987;     Ultrasound:          mins  96644;    Electrical Stimulation:         mins  68918 ( );    Untimed:  Electrical Stimulation:         mins  77458 ( );  Mechanical Traction:         mins  79068;     Timed Treatment:   45   mins   Total Treatment:     45   mins  Abdi Smalls PT, DPT, OCS  Physical Therapist

## 2021-06-14 ENCOUNTER — TREATMENT (OUTPATIENT)
Dept: PHYSICAL THERAPY | Facility: CLINIC | Age: 56
End: 2021-06-14

## 2021-06-14 DIAGNOSIS — Z96.651 PRESENCE OF ARTIFICIAL KNEE JOINT, RIGHT: Primary | ICD-10-CM

## 2021-06-14 DIAGNOSIS — G89.29 CHRONIC PAIN OF RIGHT KNEE: ICD-10-CM

## 2021-06-14 DIAGNOSIS — R26.2 DIFFICULTY WALKING: ICD-10-CM

## 2021-06-14 DIAGNOSIS — M25.561 CHRONIC PAIN OF RIGHT KNEE: ICD-10-CM

## 2021-06-14 PROCEDURE — 97110 THERAPEUTIC EXERCISES: CPT | Performed by: PHYSICAL THERAPIST

## 2021-06-14 PROCEDURE — 97140 MANUAL THERAPY 1/> REGIONS: CPT | Performed by: PHYSICAL THERAPIST

## 2021-06-14 NOTE — PROGRESS NOTES
Physical Therapy Daily Treatment Note    Patient: Maurizio Mcnair   : 1965  Diagnosis/ICD-10 Code:  Presence of artificial knee joint, right [Z96.651]  Referring practitioner: Rodney Vega MD  Date of Initial Evaluation:  Type: THERAPY  Noted: 2021  Visit # 10      Subjective   No new changes. Knee is always swollen.    Objective   R knee AAROM flexion w/ strap 83deg    See Exercise, Manual, and Modality Logs for complete treatment.       Assessment/Plan  Pt reported decreased pain w/ AAROM knee flexion post-patella mobilization and scar massage. Continues to have significantly limited knee flexion and extension. Session time limited by transportation. Progress per POC.                 Manual Therapy:    15     mins  43954;  Therapeutic Exercise:    25     mins  13150;     Neuromuscular Cassandra:    0    mins  51484;    Therapeutic Activity:     0     mins  29321;     Gait Trainin     mins  40327;     Ultrasound:     0     mins  86144;    Work Hardening           0      mins 93109  Iontophoresis               0   mins 41409    Timed Treatment:   40   mins   Total Treatment:     50   mins    Cecilia Berry PT  Physical Therapist

## 2021-06-15 DIAGNOSIS — M17.10 ARTHRITIS OF KNEE: ICD-10-CM

## 2021-06-15 NOTE — TELEPHONE ENCOUNTER
From: Maurizio Mcnair  To: Office of Rodney Vega MD  Sent: 6/15/2021 2:32 PM EDT  Subject: Medication Renewal Request    Refills have been requested for the following medications:     docusate sodium (COLACE) 100 MG capsule [Rodney Vega MD]     HYDROcodone-acetaminophen (NORCO) 7.5-325 MG per tablet [Rodney Vega MD]    Preferred pharmacy: [facility] [place] [phone] [place], [place] [phone]   Delivery method: Pickup

## 2021-06-16 ENCOUNTER — TREATMENT (OUTPATIENT)
Dept: PHYSICAL THERAPY | Facility: CLINIC | Age: 56
End: 2021-06-16

## 2021-06-16 ENCOUNTER — OFFICE VISIT (OUTPATIENT)
Dept: ORTHOPEDIC SURGERY | Facility: CLINIC | Age: 56
End: 2021-06-16

## 2021-06-16 VITALS — BODY MASS INDEX: 40.32 KG/M2 | HEIGHT: 71 IN | TEMPERATURE: 96.6 F | WEIGHT: 288 LBS

## 2021-06-16 DIAGNOSIS — Z09 SURGERY FOLLOW-UP: Primary | ICD-10-CM

## 2021-06-16 DIAGNOSIS — Z96.651 PRESENCE OF ARTIFICIAL KNEE JOINT, RIGHT: Primary | ICD-10-CM

## 2021-06-16 DIAGNOSIS — R26.2 DIFFICULTY WALKING: ICD-10-CM

## 2021-06-16 PROCEDURE — 97140 MANUAL THERAPY 1/> REGIONS: CPT | Performed by: PHYSICAL THERAPIST

## 2021-06-16 PROCEDURE — 73562 X-RAY EXAM OF KNEE 3: CPT | Performed by: NURSE PRACTITIONER

## 2021-06-16 PROCEDURE — 97110 THERAPEUTIC EXERCISES: CPT | Performed by: PHYSICAL THERAPIST

## 2021-06-16 PROCEDURE — 97112 NEUROMUSCULAR REEDUCATION: CPT | Performed by: PHYSICAL THERAPIST

## 2021-06-16 PROCEDURE — 99024 POSTOP FOLLOW-UP VISIT: CPT | Performed by: NURSE PRACTITIONER

## 2021-06-16 PROCEDURE — 97014 ELECTRIC STIMULATION THERAPY: CPT | Performed by: PHYSICAL THERAPIST

## 2021-06-16 RX ORDER — HYDROCODONE BITARTRATE AND ACETAMINOPHEN 7.5; 325 MG/1; MG/1
1 TABLET ORAL EVERY 4 HOURS PRN
Qty: 42 TABLET | Refills: 0 | Status: CANCELLED | OUTPATIENT
Start: 2021-06-16

## 2021-06-16 RX ORDER — HYDROCODONE BITARTRATE AND ACETAMINOPHEN 7.5; 325 MG/1; MG/1
TABLET ORAL
Qty: 42 TABLET | Refills: 0 | Status: CANCELLED | OUTPATIENT
Start: 2021-06-16

## 2021-06-16 RX ORDER — HYDROCODONE BITARTRATE AND ACETAMINOPHEN 7.5; 325 MG/1; MG/1
1 TABLET ORAL EVERY 4 HOURS PRN
Qty: 42 TABLET | Refills: 0 | Status: SHIPPED | OUTPATIENT
Start: 2021-06-16 | End: 2021-07-28

## 2021-06-16 RX ORDER — DOCUSATE SODIUM 100 MG/1
100 CAPSULE, LIQUID FILLED ORAL 2 TIMES DAILY
Qty: 60 CAPSULE | Refills: 0 | Status: CANCELLED | OUTPATIENT
Start: 2021-06-16

## 2021-06-16 RX ORDER — DOCUSATE SODIUM 100 MG/1
100 CAPSULE, LIQUID FILLED ORAL 2 TIMES DAILY
Qty: 60 CAPSULE | Refills: 0 | Status: SHIPPED | OUTPATIENT
Start: 2021-06-16 | End: 2021-07-28

## 2021-06-16 NOTE — PROGRESS NOTES
Physical Therapy Daily Progress Note        Subjective     Maurizio Mcnair reports: No new complaints.    Objective   Passive flexion after warm up and manual therapy 95 degrees    See Exercise, Manual, and Modality Logs for complete treatment.       Assessment/Plan  Pt works hard with his ROM on Nustep. DId well with therex today. Seeing MD after PT    Progress per Plan of Care           Manual Therapy:  15       mins  81006;  Therapeutic Exercise:25      mins  00618;     Neuromuscular Cassandra:   5    mins  26403;    Therapeutic Activity:         mins  24730;     Gait Training:         mins  11518;     Ultrasound:         mins  02631;    Electrical Stimulation:   12     mins  48864 ( );  Dry Needling         mins self-pay    Timed Treatment:   45   mins   Total Treatment:     57   mins    Chela Glaser, PT  Physical Therapist  [place] License # [id number]

## 2021-06-16 NOTE — PROGRESS NOTES
Maurizio Mcnair : 1965 MRN: [id number] DATE: 2021    DIAGNOSIS: 6 week follow up right TKA      SUBJECTIVE:  Patient returns today for 6 week follow up of right total knee replacement. Patient reports he continues to have significant pain and swelling.  Additionally, he complains that he can only sleep 1/2 to 2 hours at a time rather than the night.  He denies sleeping during the day except for a nap after dinner.    Vitals:    21 1519   Temp: 96.6 °F (35.9 °C)       OBJECTIVE:     Exam:  The incision is well healed. No sign of infection. Range of motion is measured at near full extension to 90° of flexion. The calf is soft and nontender with a negative Homans sign.  Gait is reciprocal heel-to-toe and only mildly antalgic.    DIAGNOSTIC STUDIES    Xrays: Dr. Vega and I reviewed the x-rays together.  3 views of the right knee (AP, lateral, and sunrise) were ordered and reviewed for evaluation of recent knee replacement. They demonstrate a well positioned, well aligned knee replacement without complicating factors noted. In comparison with previous films there has been no change.    ASSESSMENT:  6 week status post right knee replacement.    PLAN:   1) Continue with PT exercises as prescribed.  I encouraged him to work very hard at home to progress his motion using exercises demonstrated.   2)  For sleep, I recommended he follow up with his PCP for recommendations.  Externally, I recommended he follow-up with his pulmonologist to confirm his CPAP settings are adequate.  3)  Discontinue DVT prophylaxis.  4)  I have given his a refill of Norco and Docusate sodium today.  The risks were discussed.   5) Follow up in 6 weeks with Dr. Vega.      Bhumi Thorpe, APRN  2021

## 2021-06-21 ENCOUNTER — TREATMENT (OUTPATIENT)
Dept: PHYSICAL THERAPY | Facility: CLINIC | Age: 56
End: 2021-06-21

## 2021-06-21 DIAGNOSIS — G89.29 CHRONIC PAIN OF RIGHT KNEE: ICD-10-CM

## 2021-06-21 DIAGNOSIS — Z96.651 PRESENCE OF ARTIFICIAL KNEE JOINT, RIGHT: Primary | ICD-10-CM

## 2021-06-21 DIAGNOSIS — R26.2 DIFFICULTY WALKING: ICD-10-CM

## 2021-06-21 DIAGNOSIS — M25.561 CHRONIC PAIN OF RIGHT KNEE: ICD-10-CM

## 2021-06-21 PROCEDURE — 97110 THERAPEUTIC EXERCISES: CPT | Performed by: PHYSICAL THERAPIST

## 2021-06-21 PROCEDURE — 97530 THERAPEUTIC ACTIVITIES: CPT | Performed by: PHYSICAL THERAPIST

## 2021-06-23 ENCOUNTER — TREATMENT (OUTPATIENT)
Dept: PHYSICAL THERAPY | Facility: CLINIC | Age: 56
End: 2021-06-23

## 2021-06-23 DIAGNOSIS — Z96.651 PRESENCE OF ARTIFICIAL KNEE JOINT, RIGHT: Primary | ICD-10-CM

## 2021-06-23 DIAGNOSIS — R26.2 DIFFICULTY WALKING: ICD-10-CM

## 2021-06-23 DIAGNOSIS — M25.561 CHRONIC PAIN OF RIGHT KNEE: ICD-10-CM

## 2021-06-23 DIAGNOSIS — G89.29 CHRONIC PAIN OF RIGHT KNEE: ICD-10-CM

## 2021-06-23 PROCEDURE — 97140 MANUAL THERAPY 1/> REGIONS: CPT | Performed by: PHYSICAL THERAPIST

## 2021-06-23 PROCEDURE — 97530 THERAPEUTIC ACTIVITIES: CPT | Performed by: PHYSICAL THERAPIST

## 2021-06-23 PROCEDURE — 97112 NEUROMUSCULAR REEDUCATION: CPT | Performed by: PHYSICAL THERAPIST

## 2021-06-23 NOTE — PROGRESS NOTES
Re-Assessment / Re-Certification      Patient: Maurizio Mcnair   : 1965  Diagnosis/ICD-10 Code:  Presence of artificial knee joint, right [Z96.651]  Referring practitioner: Rondey Vega MD  Date of Initial Visit: Type: THERAPY  Noted: 2021  Today's Date: 2021  Patient seen for 13 sessions      Subjective:     Subjective Questionnaire: LEFS: 54/80  Clinical Progress: improved  Home Program Compliance: Yes  Treatment has included: therapeutic exercise, neuromuscular re-education, manual therapy, therapeutic activity and gait training    Subjective Evaluation    Pain  Current pain ratin         Objective          Strength/Myotome Testing     Left Knee   Flexion: 5    Right Knee   Flexion: 4  Extension: 4    Swelling     Left Knee Girth Measurement (cm)   Joint line: 41 cm    Right Knee Girth Measurement (cm)   Joint line: 44 cm    Ambulation     Observational Gait   Gait: antalgic   Decreased right stance time.     Functional Assessment     Single Leg Stance   Left: 30 seconds  Right: 16 seconds      Active Range of Motion     Right Knee   Flexion: 102 degrees with pain  Extension: 0 degrees with pain    Passive Range of Motion     Right Knee   Flexion: 105 degrees   Extension: 0 degrees         Assessment & Plan     Assessment  Impairments: abnormal gait, abnormal muscle firing, abnormal or restricted ROM, impaired balance, impaired physical strength, lacks appropriate home exercise program and pain with function  Assessment details: Froy has met 2/4 short term goals and 1/3 long term goals. He continues to exhibit R knee stiffness and ROM restrictions as well as minimally antalgic gait and R quad and hamstring weakness. He would benefit from additional skilled therapy to improve the problems listed above and help him   Prognosis: good  Functional Limitations: walking, sitting, standing and stooping  Goals  Plan Goals: Plan Goals: Short Term Goals (achieve in 4 weeks):  1. Pt will report  current pain decreased to 3/10.  2. Pt will be I with HEP. (met 6/23/2021)  3. Pt will ambulate with no assistive device and exhibit no compensations in gait. (no AD needed, continued minimally antalgic gait)  4. Pt will increase AROM knee flexion to 100 degrees and AROM extension to 0 in order to ascend/descend stairs with minimal compensations. (achieved on 6/23/2021)  Long Term Goals (achieve in 6-8 weeks):  5. Pt will exhibit knee flexion and extension strength increase to 4+/5 to exhibit functional gait and effective transfers. (not met)  6. Pt will increase single leg balance on affected leg to greater than 10 seconds with no handheld support to improve gait efficiency and dynamic balance. (not met)  7. Pt will report an LEFS score of 50 or greater to indicate decreased functional limitations. (met 6/23/2021)        Plan  Therapy options: will be seen for skilled physical therapy services  Planned modality interventions: cryotherapy and TENS  Planned therapy interventions: ADL retraining, balance/weight-bearing training, flexibility, functional ROM exercises, gait training, home exercise program, IADL retraining, manual therapy, neuromuscular re-education, soft tissue mobilization, strengthening, stretching and therapeutic activities  Frequency: 3x week  Duration in weeks: 12  Treatment plan discussed with: patient        Visit Diagnoses:    ICD-10-CM ICD-9-CM   1. Presence of artificial knee joint, right  Z96.651 V43.65   2. Difficulty walking  R26.2 719.7   3. Chronic pain of right knee  M25.561 719.46    G89.29 338.29       Progress toward previous goals: Partially Met          Recommendations: Continue as planned  Timeframe: 1 month  Prognosis to achieve goals: good    PT Signature: Abdi Smalls PT, DPT, OCS      Based upon review of the patient's progress and continued therapy plan, it is my medical opinion that Maurizio Mcnair should continue physical therapy treatment at [facility]  [facility]  [facility]  [address] [place] [address], [address] [id number] [phone].    Signature: __________________________________  Rodney Vega MD    Timed:  Manual Therapy:    10     mins  18879;  Therapeutic Exercise:    10     mins  65413;     Neuromuscular Cassandra:    10    mins  60628;    Therapeutic Activity:     15     mins  06365;     Gait Training:           mins  52643;     Ultrasound:          mins  12656;    Electrical Stimulation:         mins  75368 ( );    Untimed:  Electrical Stimulation:         mins  79737 ( );  Mechanical Traction:         mins  47979;     Timed Treatment:   45   mins   Total Treatment:     45   mins

## 2021-06-25 ENCOUNTER — TREATMENT (OUTPATIENT)
Dept: PHYSICAL THERAPY | Facility: CLINIC | Age: 56
End: 2021-06-25

## 2021-06-25 DIAGNOSIS — Z96.651 PRESENCE OF ARTIFICIAL KNEE JOINT, RIGHT: Primary | ICD-10-CM

## 2021-06-25 DIAGNOSIS — M25.561 CHRONIC PAIN OF RIGHT KNEE: ICD-10-CM

## 2021-06-25 DIAGNOSIS — R26.2 DIFFICULTY WALKING: ICD-10-CM

## 2021-06-25 DIAGNOSIS — G89.29 CHRONIC PAIN OF RIGHT KNEE: ICD-10-CM

## 2021-06-25 PROCEDURE — 97140 MANUAL THERAPY 1/> REGIONS: CPT | Performed by: PHYSICAL THERAPIST

## 2021-06-25 PROCEDURE — 97530 THERAPEUTIC ACTIVITIES: CPT | Performed by: PHYSICAL THERAPIST

## 2021-06-25 PROCEDURE — 97110 THERAPEUTIC EXERCISES: CPT | Performed by: PHYSICAL THERAPIST

## 2021-06-25 NOTE — PROGRESS NOTES
Physical Therapy Daily Treatment Note      Patient: Maurizio Mcnair   : 1965  Referring practitioner: Rodney Vega MD  Date of Initial Visit: Type: THERAPY  Noted: 2021  Today's Date: 2021  Patient seen for 14 sessions         Maurizio Mcnair reports: he continues to have trouble sleeping, he hasn't been sleeping in his normal bed since surgery, he thinks he will try to transition back to that this weekend.    Subjective     Objective   See Exercise, Manual, and Modality Logs for complete treatment.       Assessment/Plan  Froy continues to exhibit soft tissue restrictions of his R quad muscle that limit his ability to flex his R knee. He is tolerating increased weight on leg press with good form. He reports still feeling numb along his medial R quad, which may be limiting his ability to strongly extend his R knee.  Visit Diagnoses:    ICD-10-CM ICD-9-CM   1. Presence of artificial knee joint, right  Z96.651 V43.65   2. Difficulty walking  R26.2 719.7   3. Chronic pain of right knee  M25.561 719.46    G89.29 338.29       Progress per Plan of Care           Timed:  Manual Therapy:    10     mins  04645;  Therapeutic Exercise:    10     mins  50248;     Neuromuscular Cassandra:        mins  46616;    Therapeutic Activity:     20     mins  38438;     Gait Training:           mins  61893;     Ultrasound:          mins  94202;    Electrical Stimulation:         mins  35085 ( );    Untimed:  Electrical Stimulation:         mins  17340 ( );  Mechanical Traction:         mins  77294;     Timed Treatment:   40   mins   Total Treatment:     40   mins  Abdi Smalls PT, DPT, OCS  Physical Therapist

## 2021-06-28 ENCOUNTER — TREATMENT (OUTPATIENT)
Dept: PHYSICAL THERAPY | Facility: CLINIC | Age: 56
End: 2021-06-28

## 2021-06-28 DIAGNOSIS — M25.561 CHRONIC PAIN OF RIGHT KNEE: ICD-10-CM

## 2021-06-28 DIAGNOSIS — R26.2 DIFFICULTY WALKING: ICD-10-CM

## 2021-06-28 DIAGNOSIS — Z96.651 PRESENCE OF ARTIFICIAL KNEE JOINT, RIGHT: Primary | ICD-10-CM

## 2021-06-28 DIAGNOSIS — G89.29 CHRONIC PAIN OF RIGHT KNEE: ICD-10-CM

## 2021-06-28 PROCEDURE — 97530 THERAPEUTIC ACTIVITIES: CPT | Performed by: PHYSICAL THERAPIST

## 2021-06-28 PROCEDURE — 97140 MANUAL THERAPY 1/> REGIONS: CPT | Performed by: PHYSICAL THERAPIST

## 2021-06-28 PROCEDURE — 97112 NEUROMUSCULAR REEDUCATION: CPT | Performed by: PHYSICAL THERAPIST

## 2021-06-28 NOTE — PROGRESS NOTES
Physical Therapy Daily Treatment Note      Patient: Maurizio Mcnair   : 1965  Referring practitioner: Rodney Vega MD  Date of Initial Visit: Type: THERAPY  Noted: 2021  Today's Date: 2021  Patient seen for 15 sessions         Maurizio Mcnair reports: he has been sleeping better recently.     Subjective     Objective   See Exercise, Manual, and Modality Logs for complete treatment.       Assessment/Plan  Froy does well with morris walking and has enough knee flexion to clear the hurdles without circumducting at his hip but his knee flexion is not symmetrical with his contralateral knee.  Visit Diagnoses:    ICD-10-CM ICD-9-CM   1. Presence of artificial knee joint, right  Z96.651 V43.65   2. Difficulty walking  R26.2 719.7   3. Chronic pain of right knee  M25.561 719.46    G89.29 338.29       Progress per Plan of Care           Timed:  Manual Therapy:    10     mins  63850;  Therapeutic Exercise:    10     mins  61489;     Neuromuscular Cassandra:    10    mins  81992;    Therapeutic Activity:     15     mins  49004;     Gait Training:           mins  18728;     Ultrasound:          mins  37102;    Electrical Stimulation:         mins  63218 ( );    Untimed:  Electrical Stimulation:         mins  46335 ( );  Mechanical Traction:         mins  70396;     Timed Treatment:   45   mins   Total Treatment:     45   mins  Abdi Smalls PT, DPT, OCS  Physical Therapist

## 2021-06-30 ENCOUNTER — TREATMENT (OUTPATIENT)
Dept: PHYSICAL THERAPY | Facility: CLINIC | Age: 56
End: 2021-06-30

## 2021-06-30 DIAGNOSIS — R26.2 DIFFICULTY WALKING: ICD-10-CM

## 2021-06-30 DIAGNOSIS — Z96.651 PRESENCE OF ARTIFICIAL KNEE JOINT, RIGHT: Primary | ICD-10-CM

## 2021-06-30 DIAGNOSIS — G89.29 CHRONIC PAIN OF RIGHT KNEE: ICD-10-CM

## 2021-06-30 DIAGNOSIS — M25.561 CHRONIC PAIN OF RIGHT KNEE: ICD-10-CM

## 2021-06-30 PROCEDURE — 97112 NEUROMUSCULAR REEDUCATION: CPT | Performed by: PHYSICAL THERAPIST

## 2021-06-30 PROCEDURE — 97110 THERAPEUTIC EXERCISES: CPT | Performed by: PHYSICAL THERAPIST

## 2021-06-30 PROCEDURE — 97530 THERAPEUTIC ACTIVITIES: CPT | Performed by: PHYSICAL THERAPIST

## 2021-06-30 NOTE — PROGRESS NOTES
"   Physical Therapy Daily Treatment Note      Patient: Maurizio Mcnair   : 1965  Referring practitioner: Rodney Vega MD  Date of Initial Visit: Type: THERAPY  Noted: 2021  Today's Date: 2021  Patient seen for 16 sessions         Maurizio Mcnair reports: he had a bit of a setback this past week where he \"felt like his knee had glass in it.\"       Subjective     Objective   See Exercise, Manual, and Modality Logs for complete treatment.       Assessment/Plan  Froy began the appointment using one crutch and ambulating with a moderately antalgic gait due to symptom exacerbation. PT is attributing his symptoms to nerve regeneration since he is reporting paresthesia's in one part of his leg but is experiencing returned sensation in other parts of his leg. His ambulation improved by the end of the visit and he could ambulate without an assistive device.  Visit Diagnoses:    ICD-10-CM ICD-9-CM   1. Presence of artificial knee joint, right  Z96.651 V43.65   2. Difficulty walking  R26.2 719.7   3. Chronic pain of right knee  M25.561 719.46    G89.29 338.29       Progress per Plan of Care           Timed:  Manual Therapy:         mins  39697;  Therapeutic Exercise:    15     mins  61535;     Neuromuscular Cassandra:    15    mins  13992;    Therapeutic Activity:     15     mins  81558;     Gait Training:           mins  37954;     Ultrasound:          mins  38925;    Electrical Stimulation:         mins  35344 ( );    Untimed:  Electrical Stimulation:         mins  29339 ( );  Mechanical Traction:         mins  81106;     Timed Treatment:   45   mins   Total Treatment:     45   mins  Abdi Smalls PT, DPT, OCS  Physical Therapist  "

## 2021-07-02 ENCOUNTER — TREATMENT (OUTPATIENT)
Dept: PHYSICAL THERAPY | Facility: CLINIC | Age: 56
End: 2021-07-02

## 2021-07-02 DIAGNOSIS — R26.2 DIFFICULTY WALKING: ICD-10-CM

## 2021-07-02 DIAGNOSIS — Z96.651 PRESENCE OF ARTIFICIAL KNEE JOINT, RIGHT: Primary | ICD-10-CM

## 2021-07-02 PROCEDURE — 97110 THERAPEUTIC EXERCISES: CPT | Performed by: PHYSICAL THERAPIST

## 2021-07-02 PROCEDURE — 97140 MANUAL THERAPY 1/> REGIONS: CPT | Performed by: PHYSICAL THERAPIST

## 2021-07-07 ENCOUNTER — TREATMENT (OUTPATIENT)
Dept: PHYSICAL THERAPY | Facility: CLINIC | Age: 56
End: 2021-07-07

## 2021-07-07 DIAGNOSIS — R26.2 DIFFICULTY WALKING: ICD-10-CM

## 2021-07-07 DIAGNOSIS — Z96.651 PRESENCE OF ARTIFICIAL KNEE JOINT, RIGHT: Primary | ICD-10-CM

## 2021-07-07 PROCEDURE — 97112 NEUROMUSCULAR REEDUCATION: CPT | Performed by: PHYSICAL THERAPIST

## 2021-07-07 PROCEDURE — 97140 MANUAL THERAPY 1/> REGIONS: CPT | Performed by: PHYSICAL THERAPIST

## 2021-07-07 PROCEDURE — 97530 THERAPEUTIC ACTIVITIES: CPT | Performed by: PHYSICAL THERAPIST

## 2021-07-07 NOTE — PROGRESS NOTES
Physical Therapy Daily Treatment Note      Patient: Maurizio Mcnair   : 1965  Referring practitioner: Rodney Vega MD  Date of Initial Visit: Type: THERAPY  Noted: 2021  Today's Date: 2021  Patient seen for 18 sessions         Maurizio Mcnair reports: he's trying to walk about a mile most days of the week, his pain is much better and well controlled, he is only taking tylenol periodically.    Subjective     Objective          Passive Range of Motion     Right Knee   Flexion: 105 degrees with pain      See Exercise, Manual, and Modality Logs for complete treatment.       Assessment/Plan  Froy's PROM knee flexion continues to be limited to 105 degrees due to pain and excessive muscle guarding. Therapist educated him to not do too much at home if he struggles with PT exercises in the clinic. He may be keeping his knee sore and aggravated with excessive home exercise which is slowing his progress.  Visit Diagnoses:    ICD-10-CM ICD-9-CM   1. Presence of artificial knee joint, right  Z96.651 V43.65   2. Difficulty walking  R26.2 719.7       Progress per Plan of Care           Timed:  Manual Therapy:    10     mins  70439;  Therapeutic Exercise:    10     mins  54992;     Neuromuscular Cassandra:    15    mins  46341;    Therapeutic Activity:     10     mins  70329;     Gait Training:           mins  88019;     Ultrasound:          mins  48101;    Electrical Stimulation:         mins  21751 ( );    Untimed:  Electrical Stimulation:         mins  99289 ( );  Mechanical Traction:         mins  94062;     Timed Treatment:   45   mins   Total Treatment:     45   mins  Abdi Smalls PT, DPT, OCS  Physical Therapist

## 2021-07-09 ENCOUNTER — TREATMENT (OUTPATIENT)
Dept: PHYSICAL THERAPY | Facility: CLINIC | Age: 56
End: 2021-07-09

## 2021-07-09 DIAGNOSIS — Z96.651 PRESENCE OF ARTIFICIAL KNEE JOINT, RIGHT: Primary | ICD-10-CM

## 2021-07-09 DIAGNOSIS — M25.561 CHRONIC PAIN OF RIGHT KNEE: ICD-10-CM

## 2021-07-09 DIAGNOSIS — R26.2 DIFFICULTY WALKING: ICD-10-CM

## 2021-07-09 DIAGNOSIS — G89.29 CHRONIC PAIN OF RIGHT KNEE: ICD-10-CM

## 2021-07-09 PROCEDURE — 97530 THERAPEUTIC ACTIVITIES: CPT | Performed by: PHYSICAL THERAPIST

## 2021-07-09 PROCEDURE — 97112 NEUROMUSCULAR REEDUCATION: CPT | Performed by: PHYSICAL THERAPIST

## 2021-07-09 PROCEDURE — 97140 MANUAL THERAPY 1/> REGIONS: CPT | Performed by: PHYSICAL THERAPIST

## 2021-07-09 NOTE — PROGRESS NOTES
"   Physical Therapy Daily Treatment Note      Patient: Maurizio Mcnair   : 1965  Referring practitioner: Rodney Vega MD  Date of Initial Visit: Type: THERAPY  Noted: 2021  Today's Date: 2021  Patient seen for 19 sessions         Maurizio Mcnair reports: his pain has been more controlled this week, he did take an anti-inflammatory 30 minutes before PT today.      Subjective     Objective   See Exercise, Manual, and Modality Logs for complete treatment.       Assessment/Plan  Froy could tolerate descending stairs with no increase in pain but his eccentric quad control and endurance is still very limited. 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  Visit Diagnoses:    ICD-10-CM ICD-9-CM   1. Presence of artificial knee joint, right  Z96.651 V43.65   2. Difficulty walking  R26.2 719.7   3. Chronic pain of right knee  M25.561 719.46    G89.29 338.29       Progress per Plan of Care           Timed:  Manual Therapy:    10     mins  38866;  Therapeutic Exercise:    10     mins  11827;     Neuromuscular Cassandra:    15    mins  80665;    Therapeutic Activity:     10     mins  65871;     Gait Training:           mins  76205;     Ultrasound:          mins  51426;    Electrical Stimulation:         mins  86876 ( );    Untimed:  Electrical Stimulation:         mins  50051 ( );  Mechanical Traction:         mins  59847;     Timed Treatment:   45   mins   Total Treatment:     55   mins  Abdi Smalls PT, DPT, OCS  Physical Therapist  "

## 2021-07-14 ENCOUNTER — TREATMENT (OUTPATIENT)
Dept: PHYSICAL THERAPY | Facility: CLINIC | Age: 56
End: 2021-07-14

## 2021-07-14 DIAGNOSIS — R26.2 DIFFICULTY WALKING: ICD-10-CM

## 2021-07-14 DIAGNOSIS — G89.29 CHRONIC PAIN OF RIGHT KNEE: ICD-10-CM

## 2021-07-14 DIAGNOSIS — Z96.651 PRESENCE OF ARTIFICIAL KNEE JOINT, RIGHT: Primary | ICD-10-CM

## 2021-07-14 DIAGNOSIS — M25.561 CHRONIC PAIN OF RIGHT KNEE: ICD-10-CM

## 2021-07-14 PROCEDURE — 97530 THERAPEUTIC ACTIVITIES: CPT | Performed by: PHYSICAL THERAPIST

## 2021-07-14 PROCEDURE — 97110 THERAPEUTIC EXERCISES: CPT | Performed by: PHYSICAL THERAPIST

## 2021-07-14 PROCEDURE — 97140 MANUAL THERAPY 1/> REGIONS: CPT | Performed by: PHYSICAL THERAPIST

## 2021-07-14 NOTE — PROGRESS NOTES
Physical Therapy Daily Treatment Note      Patient: Maurizio Mcnair   : 1965  Referring practitioner: Rodney Vega MD  Date of Initial Visit: Type: THERAPY  Noted: 2021  Today's Date: 7/15/2021  Patient seen for 20 sessions         Maurizio Mcnair reports: no new complaints.    Subjective     Objective   See Exercise, Manual, and Modality Logs for complete treatment.       Assessment/Plan  Froy tolerated knee flexion PROM at edge of bed much better than supine knee flexion PROM and therapist was able to push him to at least 110 degrees of flexion.  Visit Diagnoses:    ICD-10-CM ICD-9-CM   1. Presence of artificial knee joint, right  Z96.651 V43.65   2. Difficulty walking  R26.2 719.7   3. Chronic pain of right knee  M25.561 719.46    G89.29 338.29       Progress per Plan of Care           Timed:  Manual Therapy:    15     mins  42817;  Therapeutic Exercise:    10     mins  47706;     Neuromuscular Cassandra:        mins  50252;    Therapeutic Activity:     20     mins  40382;     Gait Training:           mins  99438;     Ultrasound:          mins  98764;    Electrical Stimulation:         mins  96677 ( );    Untimed:  Electrical Stimulation:         mins  38054 ( );  Mechanical Traction:         mins  92956;     Timed Treatment:   45   mins   Total Treatment:     55   mins  Abdi Smalls PT, DPT, OCS  Physical Therapist

## 2021-07-16 ENCOUNTER — TREATMENT (OUTPATIENT)
Dept: PHYSICAL THERAPY | Facility: CLINIC | Age: 56
End: 2021-07-16

## 2021-07-16 DIAGNOSIS — R26.2 DIFFICULTY WALKING: ICD-10-CM

## 2021-07-16 DIAGNOSIS — Z96.651 PRESENCE OF ARTIFICIAL KNEE JOINT, RIGHT: Primary | ICD-10-CM

## 2021-07-16 DIAGNOSIS — M25.561 CHRONIC PAIN OF RIGHT KNEE: ICD-10-CM

## 2021-07-16 DIAGNOSIS — G89.29 CHRONIC PAIN OF RIGHT KNEE: ICD-10-CM

## 2021-07-16 PROCEDURE — 97530 THERAPEUTIC ACTIVITIES: CPT | Performed by: PHYSICAL THERAPIST

## 2021-07-16 PROCEDURE — 97140 MANUAL THERAPY 1/> REGIONS: CPT | Performed by: PHYSICAL THERAPIST

## 2021-07-16 PROCEDURE — 97112 NEUROMUSCULAR REEDUCATION: CPT | Performed by: PHYSICAL THERAPIST

## 2021-07-16 NOTE — PROGRESS NOTES
Physical Therapy Daily Treatment Note      Patient: Maurizio Mcnair   : 1965  Referring practitioner: Rodney Vega MD  Date of Initial Visit: Type: THERAPY  Noted: 2021  Today's Date: 2021  Patient seen for 21 sessions         Maurizio Mcnair reports: he is cramping a lot today, which limited his ability to stretch out or get limber before his appointment. He really thinks the edge of bed stretching at last visit really helped.      Subjective     Objective   See Exercise, Manual, and Modality Logs for complete treatment.       Assessment/Plan  Froy tolerates seated EOB knee flexion PROM much better, but continues to exhibit 105 degrees of knee flexion passively and 95 degrees of AROM knee flexion.  Visit Diagnoses:    ICD-10-CM ICD-9-CM   1. Presence of artificial knee joint, right  Z96.651 V43.65   2. Difficulty walking  R26.2 719.7   3. Chronic pain of right knee  M25.561 719.46    G89.29 338.29       Progress per Plan of Care           Timed:  Manual Therapy:    10     mins  54917;  Therapeutic Exercise:    10     mins  72275;     Neuromuscular Cassandra:    10    mins  79271;    Therapeutic Activity:     15     mins  27275;     Gait Training:           mins  56335;     Ultrasound:          mins  83654;    Electrical Stimulation:         mins  11348 ( );    Untimed:  Electrical Stimulation:         mins  46756 ( );  Mechanical Traction:         mins  32828;     Timed Treatment:   45   mins   Total Treatment:     55   mins  Abdi Smalls PT, DPT, OCS  Physical Therapist

## 2021-07-19 ENCOUNTER — TREATMENT (OUTPATIENT)
Dept: PHYSICAL THERAPY | Facility: CLINIC | Age: 56
End: 2021-07-19

## 2021-07-19 DIAGNOSIS — G89.29 CHRONIC PAIN OF RIGHT KNEE: ICD-10-CM

## 2021-07-19 DIAGNOSIS — Z96.651 PRESENCE OF ARTIFICIAL KNEE JOINT, RIGHT: Primary | ICD-10-CM

## 2021-07-19 DIAGNOSIS — R26.2 DIFFICULTY WALKING: ICD-10-CM

## 2021-07-19 DIAGNOSIS — M25.561 CHRONIC PAIN OF RIGHT KNEE: ICD-10-CM

## 2021-07-19 PROCEDURE — 97530 THERAPEUTIC ACTIVITIES: CPT | Performed by: PHYSICAL THERAPIST

## 2021-07-19 PROCEDURE — 97140 MANUAL THERAPY 1/> REGIONS: CPT | Performed by: PHYSICAL THERAPIST

## 2021-07-19 PROCEDURE — 97112 NEUROMUSCULAR REEDUCATION: CPT | Performed by: PHYSICAL THERAPIST

## 2021-07-19 NOTE — PROGRESS NOTES
Physical Therapy Daily Treatment Note      Patient: Maurizio Mcnair   : 1965  Referring practitioner: Rodney Vega MD  Date of Initial Visit: Type: THERAPY  Noted: 2021  Today's Date: 2021  Patient seen for 22 sessions         Maurizio Mcnair reports: he's still pretty stiff but overall feeling pretty good.    Subjective     Objective   See Exercise, Manual, and Modality Logs for complete treatment.       Assessment/Plan  Froy began feeling R quad cramping while warming up on the recumbent stepper. He responded well to soft tissue mobilization of his R quad and could tolerate edge of bed PROM stretching to ~ 110 degrees today. He continues to exhibit R gluteus medius weakness and difficulty with single leg stance on the R leg.  Visit Diagnoses:    ICD-10-CM ICD-9-CM   1. Presence of artificial knee joint, right  Z96.651 V43.65   2. Difficulty walking  R26.2 719.7   3. Chronic pain of right knee  M25.561 719.46    G89.29 338.29       Progress per Plan of Care           Timed:  Manual Therapy:    10     mins  54028;  Therapeutic Exercise:    10     mins  17558;     Neuromuscular Cassandra:    10    mins  19833;    Therapeutic Activity:     15     mins  35876;     Gait Training:           mins  07126;     Ultrasound:         mins  75661;    Electrical Stimulation:         mins  45529 ( );    Untimed:  Electrical Stimulation:         mins  67732 ( );  Mechanical Traction:         mins  06086;     Timed Treatment:   45   mins   Total Treatment:     45   mins  Abdi Smalls PT, DPT, OCS  Physical Therapist

## 2021-07-21 ENCOUNTER — TREATMENT (OUTPATIENT)
Dept: PHYSICAL THERAPY | Facility: CLINIC | Age: 56
End: 2021-07-21

## 2021-07-21 DIAGNOSIS — G89.29 CHRONIC PAIN OF RIGHT KNEE: ICD-10-CM

## 2021-07-21 DIAGNOSIS — R26.2 DIFFICULTY WALKING: ICD-10-CM

## 2021-07-21 DIAGNOSIS — Z96.651 PRESENCE OF ARTIFICIAL KNEE JOINT, RIGHT: Primary | ICD-10-CM

## 2021-07-21 DIAGNOSIS — M25.561 CHRONIC PAIN OF RIGHT KNEE: ICD-10-CM

## 2021-07-21 PROCEDURE — 97530 THERAPEUTIC ACTIVITIES: CPT | Performed by: PHYSICAL THERAPIST

## 2021-07-21 PROCEDURE — 97110 THERAPEUTIC EXERCISES: CPT | Performed by: PHYSICAL THERAPIST

## 2021-07-21 PROCEDURE — 97140 MANUAL THERAPY 1/> REGIONS: CPT | Performed by: PHYSICAL THERAPIST

## 2021-07-21 NOTE — PROGRESS NOTES
Physical Therapy Daily Treatment Note      Patient: Maurizio Mcnair   : 1965  Referring practitioner: Rodney Vega MD  Date of Initial Visit: Type: THERAPY  Noted: 2021  Today's Date: 2021  Patient seen for 23 sessions         Maurizio Mcnair reports: he's still having a lot of cramping in his groin and upper legs, R>L.    Subjective     Objective   See Exercise, Manual, and Modality Logs for complete treatment.       Assessment/Plan  Froy exhibited the ability to perform full revolutions with the pedals on the recumbent bike today, which he was not capable of previously due to knee flexion ROM restrictions. He needs cueing to avoid compensatory trunk flexion and hip hiking but he achieves full revolutions on the bike.  Visit Diagnoses:    ICD-10-CM ICD-9-CM   1. Presence of artificial knee joint, right  Z96.651 V43.65   2. Difficulty walking  R26.2 719.7   3. Chronic pain of right knee  M25.561 719.46    G89.29 338.29       Progress per Plan of Care           Timed:  Manual Therapy:    15     mins  77099;  Therapeutic Exercise:    15     mins  16259;     Neuromuscular Cassandra:        mins  58281;    Therapeutic Activity:     15     mins  75125;     Gait Training:           mins  97537;     Ultrasound:          mins  98966;    Electrical Stimulation:         mins  70274 ( );    Untimed:  Electrical Stimulation:         mins  15075 ( );  Mechanical Traction:         mins  62793;     Timed Treatment:   45   mins   Total Treatment:     55   mins  Abdi Smalls PT, DPT, OCS  Physical Therapist

## 2021-07-23 ENCOUNTER — TREATMENT (OUTPATIENT)
Dept: PHYSICAL THERAPY | Facility: CLINIC | Age: 56
End: 2021-07-23

## 2021-07-23 DIAGNOSIS — Z96.651 PRESENCE OF ARTIFICIAL KNEE JOINT, RIGHT: Primary | ICD-10-CM

## 2021-07-23 DIAGNOSIS — R26.2 DIFFICULTY WALKING: ICD-10-CM

## 2021-07-23 PROCEDURE — 97110 THERAPEUTIC EXERCISES: CPT | Performed by: PHYSICAL THERAPIST

## 2021-07-23 PROCEDURE — 97530 THERAPEUTIC ACTIVITIES: CPT | Performed by: PHYSICAL THERAPIST

## 2021-07-23 NOTE — PATIENT INSTRUCTIONS
Access Code: [email or web address]  URL: [email or web address]  Date: 07/23/2021  Prepared by: Daniela Morales    Exercises  Supine Hamstring Stretch with Strap - 2 x daily - 7 x weekly - 1 sets - 4 reps - 30 sec. hold  Supine ITB Stretch with Strap - 2 x daily - 7 x weekly - 1 sets - 4 reps - 30 sec. hold  Hip Adductors and Hamstring Stretch with Strap - 2 x daily - 7 x weekly - 1 sets - 4 reps - 30 sec. hold  Prone Quad Stretch with Towel Roll and Strap - 2 x daily - 7 x weekly - 1 sets - 4 reps - 30 sec. hold  Supine Quadriceps Stretch with Strap on Table - 2 x daily - 7 x weekly - 1 sets - 4 reps - 30 sec. hold  Supine Heel Slide with Strap - 2 x daily - 7 x weekly - 4 sets - 30 reps     DOCUMENTATION ONLY:  Prior Authorization for Dupixent approved from 06/21/19 to 06/20/2020    Case Id: [id number]    Co-pay: $0    Patient Assistance IS NOT required.    Forwarded to the clinical pharmacist for consult and shipment.    -ARR

## 2021-07-23 NOTE — PROGRESS NOTES
Physical Therapy Re-Assessment / Re-Certification        Patient: Maurizio Mcnair   : 1965  Visit Diagnoses:     ICD-10-CM ICD-9-CM   1. Presence of artificial knee joint, right  Z96.651 V43.65   2. Difficulty walking  R26.2 719.7     Referring practitioner: Rodney Vega MD  Date of Initial Visit: Type: THERAPY  Noted: 2021  Today's Date: 2021  Patient seen for 24 sessions      Subjective:   Maurizio Mcnair reports:   Subjective Questionnaire: LEFS: 59/80  Clinical Progress: improved  Home Program Compliance: Yes  Treatment has included: therapeutic exercise, neuromuscular re-education, manual therapy, therapeutic activity and cryotherapy    Subjective Evaluation    Pain  Pain scale at lowest: 0-1 at rest.  Pain scale at highest: 4-5 with exercise, sharper spikes of pain with knee bend stretching.         Patient reports his knee is progressing and feels better since last session.  Patient expressed concern regarding return to work requirement of performing a squat with feel shoulder width and being able to touch the ground with his hands.    Objective     Strength/Myotome Testing       Right Knee   Flexion: 5  Extension: 5     Swelling      Left Knee Girth Measurement (cm)   Joint line: 41 cm     Right Knee Girth Measurement (cm)   Joint line: 41 cm     Ambulation      Observational Gait   Gait: WNLs, occasional decreased heel strike on right     Functional Assessment      Single Leg Stance   Left: 60 seconds  Right: 60 seconds      Active Range of Motion      Right Knee before stretching exercises  Flexion: 97 degrees  Extension: 0 degrees      Right Knee after stretching exercises  Flexion: 112 degrees  Extension: 0 degrees     Assessment/Plan  Patient presents with improved ROM, strength, and gait mechanics.  He does still have ROM restriction into right knee flexion.  He was able to make significant gains this session after performing advanced stretching program.  He will benefit from  continued PT to progress knee flexion.  HEP was progressed and written instructions for home use were issued.  Progress toward previous goals: Partially Met    Goal Review  Short Term Goals (achieve in 4 weeks):  1. Pt will report current pain decreased to 3/10. (mixed responses)  2. Pt will be I with HEP. (met 6/23/2021)  3. Pt will ambulate with no assistive device and exhibit no compensations in gait. (met)  4. Pt will increase AROM knee flexion to 100 degrees and AROM extension to 0 in order to ascend/descend stairs with minimal compensations. (achieved on 6/23/2021)  5.  Patient will demonstrate ability to squat with feet shoulder width, touching hands to floor.    Long Term Goals:  5. Pt will exhibit knee flexion and extension strength increase to 4+/5 to exhibit functional gait and effective transfers. (met)  6. Pt will increase single leg balance on affected leg to greater than 10 seconds with no handheld support to improve gait efficiency and dynamic balance. (met)  7. Pt will report an LEFS score of 50 or greater to indicate decreased functional limitations. (met 6/23/2021)        Recommendations: Continue as planned  Timeframe: 1 month  Prognosis to achieve goals: good    PT Signature: Daniela Morales, PT      Based upon review of the patient's progress and continued therapy plan, it is my medical opinion that Maurizio Mcnair should continue physical therapy treatment at [facility]  [address] [place] [id number] [phone].    Signature: __________________________________  Rodney Vega MD    Timed:  Manual Therapy:         mins  95089;  Therapeutic Exercise:    30     mins  14841;     Neuromuscular Cassandra:        mins  16400;    Therapeutic Activity:     15     mins  51640;     Gait Training:           mins  02204;     Ultrasound:          mins  48984;  Iontophoresis:          mins  15796;    Dry Needling:          mins  ;    Untimed:  Electrical Stimulation:         mins  94993 ( );  Mechanical Traction:         mins  33064;   Canalith Repositioning:        mins  62532;    Timed Treatment:   45   mins   Total Treatment:     55   mins

## 2021-07-26 ENCOUNTER — TREATMENT (OUTPATIENT)
Dept: PHYSICAL THERAPY | Facility: CLINIC | Age: 56
End: 2021-07-26

## 2021-07-26 DIAGNOSIS — R26.2 DIFFICULTY WALKING: ICD-10-CM

## 2021-07-26 DIAGNOSIS — M25.561 CHRONIC PAIN OF RIGHT KNEE: ICD-10-CM

## 2021-07-26 DIAGNOSIS — G89.29 CHRONIC PAIN OF RIGHT KNEE: ICD-10-CM

## 2021-07-26 DIAGNOSIS — Z96.651 PRESENCE OF ARTIFICIAL KNEE JOINT, RIGHT: Primary | ICD-10-CM

## 2021-07-26 PROCEDURE — 97530 THERAPEUTIC ACTIVITIES: CPT | Performed by: PHYSICAL THERAPIST

## 2021-07-26 PROCEDURE — 97110 THERAPEUTIC EXERCISES: CPT | Performed by: PHYSICAL THERAPIST

## 2021-07-26 PROCEDURE — 97140 MANUAL THERAPY 1/> REGIONS: CPT | Performed by: PHYSICAL THERAPIST

## 2021-07-26 NOTE — PROGRESS NOTES
Physical Therapy Daily Treatment Note      Patient: Maurizio Mcnair   : 1965  Referring practitioner: Rodney Vega MD  Date of Initial Visit: Type: THERAPY  Noted: 2021  Today's Date: 2021  Patient seen for 25 sessions         Maurizio Mcnair reports:he feels like his knee flexion has gotten better over the past week, he is not having as much difficulty with squatting.    Subjective     Objective          Active Range of Motion     Right Knee   Flexion: 105 degrees     Passive Range of Motion     Right Knee   Flexion: 113 degrees       See Exercise, Manual, and Modality Logs for complete treatment.       Assessment/Plan  Froy exhibited 105 degrees of AROM knee flexion and 113 degrees of PROM knee flexion today. He can deep squat and touch the floor with no sitting to one side or the other.  Visit Diagnoses:    ICD-10-CM ICD-9-CM   1. Presence of artificial knee joint, right  Z96.651 V43.65   2. Difficulty walking  R26.2 719.7   3. Chronic pain of right knee  M25.561 719.46    G89.29 338.29       Progress per Plan of Care           Timed:  Manual Therapy:    15     mins  47614;  Therapeutic Exercise:    15     mins  22382;     Neuromuscular Cassandra:        mins  99011;    Therapeutic Activity:     15     mins  44931;     Gait Training:           mins  59870;     Ultrasound:          mins  03938;    Electrical Stimulation:         mins  41893 ( );    Untimed:  Electrical Stimulation:         mins  34812 ( );  Mechanical Traction:         mins  11148;     Timed Treatment:   45   mins   Total Treatment:     45   mins  Abdi Smalls PT, DPT, OCS  Physical Therapist

## 2021-07-28 ENCOUNTER — OFFICE VISIT (OUTPATIENT)
Dept: ORTHOPEDIC SURGERY | Facility: CLINIC | Age: 56
End: 2021-07-28

## 2021-07-28 ENCOUNTER — TREATMENT (OUTPATIENT)
Dept: PHYSICAL THERAPY | Facility: CLINIC | Age: 56
End: 2021-07-28

## 2021-07-28 VITALS — BODY MASS INDEX: 40.32 KG/M2 | TEMPERATURE: 97.2 F | HEIGHT: 71 IN | WEIGHT: 288 LBS

## 2021-07-28 DIAGNOSIS — Z09 SURGERY FOLLOW-UP: Primary | ICD-10-CM

## 2021-07-28 DIAGNOSIS — M25.561 CHRONIC PAIN OF RIGHT KNEE: ICD-10-CM

## 2021-07-28 DIAGNOSIS — R26.2 DIFFICULTY WALKING: ICD-10-CM

## 2021-07-28 DIAGNOSIS — Z96.651 PRESENCE OF ARTIFICIAL KNEE JOINT, RIGHT: Primary | ICD-10-CM

## 2021-07-28 DIAGNOSIS — G89.29 CHRONIC PAIN OF RIGHT KNEE: ICD-10-CM

## 2021-07-28 PROCEDURE — 97110 THERAPEUTIC EXERCISES: CPT | Performed by: PHYSICAL THERAPIST

## 2021-07-28 PROCEDURE — 97530 THERAPEUTIC ACTIVITIES: CPT | Performed by: PHYSICAL THERAPIST

## 2021-07-28 PROCEDURE — 97140 MANUAL THERAPY 1/> REGIONS: CPT | Performed by: PHYSICAL THERAPIST

## 2021-07-28 PROCEDURE — 99024 POSTOP FOLLOW-UP VISIT: CPT | Performed by: ORTHOPAEDIC SURGERY

## 2021-07-28 PROCEDURE — 73562 X-RAY EXAM OF KNEE 3: CPT | Performed by: ORTHOPAEDIC SURGERY

## 2021-07-28 RX ORDER — IBUPROFEN 800 MG/1
800 TABLET ORAL EVERY 6 HOURS PRN
COMMUNITY

## 2021-07-28 NOTE — PROGRESS NOTES
Maurizio Mcnair : 1965 MRN: [id number] DATE: 2021     DIAGNOSIS: 3 month follow up right TKA      SUBJECTIVE:  Patient returns today for 3 month follow up of right total knee replacement.  Patient reports doing well with no unusual complaints.     Vitals:    21 1507   Temp: 97.2 °F (36.2 °C)       OBJECTIVE:     Exam:  The incision is well healed. No sign of infection. Range of motion is measured at 0 to 115. The calf is soft and nontender with a negative Homans sign.  Gait is reciprocal heel-to-toe and nonantalgic.    DIAGNOSTIC STUDIES    Xrays: AP, lateral and merchant's views of the right knee are ordered and reviewed for evaluation of recent knee replacement. They demonstrate a well positioned, well aligned knee replacement without complicating factors noted.  In comparison with previous films there has been no change.    ASSESSMENT: 3 months status post right knee replacement.    PLAN: 1) Continue with PT exercises as prescribed   2) Follow up in 6 months   3)  Antibiotic prophylaxis discussed    Rodney Vega MD    2021

## 2021-07-28 NOTE — PROGRESS NOTES
Physical Therapy Daily Treatment Note      Patient: Maurizio Mcnair   : 1965  Referring practitioner: Rodney Vega MD  Date of Initial Visit: Type: THERAPY  Noted: 2021  Today's Date: 2021  Patient seen for 26 sessions         Maurizio Mcnair reports: he visits his MD this afternoon. He plans to ask to be released to return to work.     Subjective     Objective          Active Range of Motion     Right Knee   Flexion: 115 degrees   Extension: 0 degrees     Strength/Myotome Testing     Left Knee   Flexion: 5  Extension: 5    Right Knee   Flexion: 5  Extension: 5    Functional Assessment     Single Leg Stance   Left: 15 seconds  Right: 15 seconds      See Exercise, Manual, and Modality Logs for complete treatment.       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 His biggest limitation is stiffness of his R knee, which he is comfortable managing with a home program on his own. Pending MD appointment this afternoon, he is appropriate for discharge to a home program so he can return to work.  Visit Diagnoses:    ICD-10-CM ICD-9-CM   1. Presence of artificial knee joint, right  Z96.651 V43.65   2. Difficulty walking  R26.2 719.7   3. Chronic pain of right knee  M25.561 719.46    G89.29 338.29                  Timed:  Manual Therapy:    15     mins  34518;  Therapeutic Exercise:    15     mins  96869;     Neuromuscular Cassandra:        mins  96488;    Therapeutic Activity:     25     mins  29519;     Gait Training:           mins  27737;     Ultrasound:          mins  00239;    Electrical Stimulation:         mins  33108 ( );    Untimed:  Electrical Stimulation:         mins  96367 ( );  Mechanical Traction:         mins  92575;     Timed Treatment:   55   mins   Total Treatment:     55   mins  Abdi Smalls PT, DPT, OCS  Physical  Therapist

## 2022-01-05 ENCOUNTER — OFFICE VISIT (OUTPATIENT)
Dept: ORTHOPEDIC SURGERY | Facility: CLINIC | Age: 57
End: 2022-01-05

## 2022-01-05 VITALS — HEIGHT: 72 IN | TEMPERATURE: 97.4 F | BODY MASS INDEX: 39.96 KG/M2 | WEIGHT: 295 LBS

## 2022-01-05 DIAGNOSIS — S86.911A KNEE STRAIN, RIGHT, INITIAL ENCOUNTER: Primary | ICD-10-CM

## 2022-01-05 PROCEDURE — 99213 OFFICE O/P EST LOW 20 MIN: CPT | Performed by: ORTHOPAEDIC SURGERY

## 2022-01-05 NOTE — PROGRESS NOTES
CC:  Right knee injury    Mr. Mcnair was at work yesterday when he twisted his right knee and aggravated the replacement.  He could hardly bear weight yesterday.  It is better today.  Current pain is moderate, 5 out of 10.  Localizes pain to the anterolateral aspect of the knee.  He says his knee was doing fine until this recent injury.  He has been take meloxicam and it seems to help.    His surgical incision is healed.  No effusion, increased warmth or erythema.  He has mild tenderness over the lateral quad tendon extending down over the lateral patellofemoral retinaculum.  No palpable defect.  He can actively extend with good strength.  He has full extension.  His flexion is limited to about 95 degrees with discomfort.  No instability.  Gait is mildly antalgic.      His outside x-rays including AP and lateral views of the right knee are brought in by the patient and reviewed.  His implants appear well fixed and well-positioned.  No acute abnormality noted.  No change relative to previous films.    Assessment: Right knee strain    Plan: He has been taking meloxicam with good response.  He can continue that as needed.  I am going to put him on work restrictions.  We discussed relative rest, icing as needed.  I will see him back in 10 days for reevaluation.  If he is better, I may release him to go back to work at that time.    Rodney Vega MD

## 2022-01-17 ENCOUNTER — TELEPHONE (OUTPATIENT)
Dept: ORTHOPEDIC SURGERY | Facility: CLINIC | Age: 57
End: 2022-01-17

## 2022-01-17 NOTE — TELEPHONE ENCOUNTER
Caller: KOFI PEREZ    Relationship to patient: SELF    Best call back number:     Patient is needing: UNABLE TO WARM TRANSFER.  PATIENT POSSIBLE COVID SYMPTOMS PLEASE CALL TO DO VIRTUAL OR RESCHEDULE

## 2022-01-31 ENCOUNTER — OFFICE VISIT (OUTPATIENT)
Dept: ORTHOPEDIC SURGERY | Facility: CLINIC | Age: 57
End: 2022-01-31

## 2022-01-31 VITALS — BODY MASS INDEX: 39.96 KG/M2 | WEIGHT: 295 LBS | TEMPERATURE: 97.3 F | HEIGHT: 72 IN

## 2022-01-31 DIAGNOSIS — Z09 SURGERY FOLLOW-UP: Primary | ICD-10-CM

## 2022-01-31 PROCEDURE — 99212 OFFICE O/P EST SF 10 MIN: CPT | Performed by: ORTHOPAEDIC SURGERY

## 2022-01-31 NOTE — PROGRESS NOTES
CC:  Right knee injury    Froy follows up for his right knee.  He says it is doing great.  He says the knee is actually better than before he injured it.  He is not having any significant pain.  He feels like he is ready to go back to regular duty at work.    His incision is healed.  He has a little bit of edema but no effusion.  No increased warmth or erythema.  He has excellent motion.  No instability.  Gait is nonantalgic.    AP and lateral views of the right knee are ordered and reviewed to evaluate his complaint.  These compared to previous x-rays.  No concerning findings.  Implants are well fixed and well-positioned.    Assessment: 9-month status post right total knee arthroplasty with recent knee strain    Plan: His pain is resolved.  He seems to be doing great.  Appropriate activity modification and restrictions discussed.  He has requested I release him to go back to regular duty at work which is fine.  Antibiotic prophylaxis recommendations were discussed.  I will see him back annually.    Rodney Vega MD

## 2022-11-23 ENCOUNTER — OFFICE VISIT (OUTPATIENT)
Dept: GASTROENTEROLOGY | Facility: CLINIC | Age: 57
End: 2022-11-23

## 2022-11-23 VITALS
DIASTOLIC BLOOD PRESSURE: 87 MMHG | TEMPERATURE: 97.7 F | HEART RATE: 67 BPM | BODY MASS INDEX: 42.38 KG/M2 | WEIGHT: 302.7 LBS | HEIGHT: 71 IN | SYSTOLIC BLOOD PRESSURE: 126 MMHG

## 2022-11-23 DIAGNOSIS — R10.13 DYSPEPSIA: Primary | ICD-10-CM

## 2022-11-23 PROCEDURE — 99204 OFFICE O/P NEW MOD 45 MIN: CPT | Performed by: INTERNAL MEDICINE

## 2022-11-23 RX ORDER — ALBUTEROL SULFATE 90 UG/1
2 AEROSOL, METERED RESPIRATORY (INHALATION)
COMMUNITY
Start: 2022-07-05

## 2022-11-23 RX ORDER — FLUTICASONE PROPIONATE AND SALMETEROL 100; 50 UG/1; UG/1
1 POWDER RESPIRATORY (INHALATION)
COMMUNITY
Start: 2022-07-07

## 2022-11-23 NOTE — PROGRESS NOTES
Chief Complaint   Patient presents with   • Diarrhea   • Difficulty Swallowing   • Abdominal Pain   • Nausea     Subjective   HPI  Maurizio Mcnair is a 57 y.o. male who presents today for new patient evaluation.  Followed previously by [facility].    Recent episode of food poisoning, diarrhea and nausea.  Stool positive for Campylobacter.  Symptoms have improved without need for abx treatment.      Also reports more chronic issue of cramping after eating.  Has trouble with beef and dark meat.  Started back in August.  He has been on gluten reduced diet.  Also reports he is lactose intolerant.        Prior GI workup via [facility]  EGD 10/8/2019:  Post-op Diagnosis: - Widely patent distal esophageal ring.  - 3 cm hiatal hernia with Lucian erosions.  - Gastric polyps  - Antral nodule  - Normal examined duodenum.  - No specimens collected.    Colonoscopy 8/11/2017: Normal       Objective   Vitals:    11/23/22 0906   BP: 126/87   Pulse: 67   Temp: 97.7 °F (36.5 °C)     Physical Exam  Vitals reviewed.   Constitutional:       Appearance: He is well-developed.   HENT:      Head: Normocephalic and atraumatic.   Abdominal:      General: Bowel sounds are normal. There is no distension.      Palpations: Abdomen is soft. There is no mass.      Tenderness: There is no abdominal tenderness.      Hernia: No hernia is present.   Skin:     General: Skin is warm and dry.   Neurological:      Mental Status: He is alert and oriented to person, place, and time.   Psychiatric:         Behavior: Behavior normal.         Thought Content: Thought content normal.         Judgment: Judgment normal.              Assessment & Plan   Assessment:     1. Dyspepsia    2.      Food intolerance    Plan:   Check celiac panel and food allergy profile  Trial of Levsin SL PRN  Avoid culprit foods  Follow up 6 weeks if no improvement consider RUQ u/s and HIDA scan          Stevie Gale M.D.  [facility]  [address]  [place] [address]  Office: [phone]

## 2022-11-25 LAB
GLIADIN PEPTIDE IGA SER-ACNC: 9 UNITS (ref 0–19)
GLIADIN PEPTIDE IGG SER-ACNC: 7 UNITS (ref 0–19)
IGA SERPL-MCNC: 191 MG/DL (ref 90–386)
TTG IGA SER-ACNC: 2 U/ML (ref 0–3)
TTG IGG SER-ACNC: 10 U/ML (ref 0–5)

## 2022-11-28 LAB
CLAM IGE QN: <0.1 KU/L
CODFISH IGE QN: <0.1 KU/L
CONV CLASS DESCRIPTION: ABNORMAL
CORN IGE QN: <0.1 KU/L
COW MILK IGE QN: <0.1 KU/L
EGG WHITE IGE QN: 0.12 KU/L
PEANUT IGE QN: <0.1 KU/L
SCALLOP IGE QN: <0.1 KU/L
SESAME SEED IGE QN: <0.1 KU/L
SHRIMP IGE QN: <0.1 KU/L
SOYBEAN IGE QN: <0.1 KU/L
WALNUT IGE QN: <0.1 KU/L
WHEAT IGE QN: <0.1 KU/L

## 2022-12-13 ENCOUNTER — TELEPHONE (OUTPATIENT)
Dept: GASTROENTEROLOGY | Facility: CLINIC | Age: 57
End: 2022-12-13

## 2022-12-13 NOTE — TELEPHONE ENCOUNTER
----- Message from Stevie Gale MD sent at 12/13/2022 11:46 AM EST -----  No significant food allergies  Only very mild non specific elevation of one celiac antibody, the other 3 were all normal. Office f/u as scheduled

## 2023-01-11 ENCOUNTER — OFFICE VISIT (OUTPATIENT)
Dept: GASTROENTEROLOGY | Facility: CLINIC | Age: 58
End: 2023-01-11
Payer: COMMERCIAL

## 2023-01-11 VITALS
SYSTOLIC BLOOD PRESSURE: 130 MMHG | HEART RATE: 67 BPM | TEMPERATURE: 97.1 F | WEIGHT: 308 LBS | DIASTOLIC BLOOD PRESSURE: 86 MMHG | BODY MASS INDEX: 41.72 KG/M2 | HEIGHT: 72 IN

## 2023-01-11 DIAGNOSIS — R10.30 LOWER ABDOMINAL PAIN: ICD-10-CM

## 2023-01-11 DIAGNOSIS — K21.9 GASTROESOPHAGEAL REFLUX DISEASE WITHOUT ESOPHAGITIS: Primary | ICD-10-CM

## 2023-01-11 PROCEDURE — 99213 OFFICE O/P EST LOW 20 MIN: CPT | Performed by: PHYSICIAN ASSISTANT

## 2023-01-11 RX ORDER — MELOXICAM 15 MG/1
15 TABLET ORAL DAILY
COMMUNITY
Start: 2023-01-02

## 2023-01-11 NOTE — Clinical Note
56 y/o follow up dyspeptic sxs and abdominal pain following campy infection. Heartburn well controlled with 40mg omeprazole daily, rare need for second dose. Avoiding trigger foods. Still with some cramping abd pain. Discussed levsin as needed and trial of altoids. F/u 6 months, sooner if necessary

## 2023-01-11 NOTE — PROGRESS NOTES
Chief Complaint  dyspepsia and Abdominal Pain    Subjective        History of Present Illness  Maurizio Mcnair is a  57 y.o. male here for follow-up for abdominal pain and dyspeptic symptoms.  He is a patient of Dr. Gale, new to me today.  He was last seen in the clinic on 12/13/2022.  Previously followed by [facility].    He presents today with improvement in symptoms.  He continues to have some cramping abdominal pain but states it is better.  He is taking Levsin twice daily, if he takes any more months to constipation.  He has been avoiding things such as red meat (no history of tick bites) and dairy due to intolerance.  His bowels are moving daily without trent blood or melena.  He is on omeprazole once daily which controls his symptoms quite well and rarely requires a second dose.    Creatinine 1.28 11/22/2022      Prior GI workup via [facility] reviewed  EGD completed 10/8/2019:  Post-op Diagnosis: - Widely patent distal esophageal ring.  - 3 cm hiatal hernia with Lucian erosions.  - Gastric polyps  - Antral nodule  - Normal examined duodenum.  - No specimens collected.    Colonoscopy 8/11/2017 reportedly normal      Past Medical History:   Diagnosis Date   • Acute torn meniscus     RT KNEE   • Asthma    • Dysphagia    • GERD (gastroesophageal reflux disease)    • Hernia 1990   • Hypertension    • Irritable bowel syndrome 2018   • Lactose intolerance 2020   • Limited mobility     RIGHT KNEE    • Pneumonia    • Right knee pain    • Seasonal allergies    • Sleep apnea     C-PAP   • Weakness     RIGHT KNEE       Past Surgical History:   Procedure Laterality Date   • ABDOMINAL SURGERY  1990   • CATARACT EXTRACTION Bilateral 04/2017   • COLONOSCOPY     • CYST REMOVAL      MID ABD-AROUND BELLY BUTTON    • ESOPHAGEAL DILATATION     • HERNIA REPAIR Right 06/1988   • HERNIA REPAIR      UMBILICAL   • KNEE ARTHROSCOPY Right 01/15/2019    Procedure: Knee Arthroscopy with partial medial lateral  "Menisectomy;  Surgeon: Rodney Vega MD;  [place] [facility] [place] [facility]  Service: Orthopedics   • KNEE ARTHROSCOPY Right 12/03/2019    Procedure: Knee Arthroscopy with Menisectomy;  Surgeon: Rodney Vega MD;  Location[place] [facility] [place] [facility];  Service: Orthopedics   • NASAL POLYP EXCISION  10/2016    SEPTOPLASTY   • TONSILLECTOMY     • TOTAL KNEE ARTHROPLASTY Right 05/06/2021    Procedure: TOTAL KNEE ARTHROPLASTY;  Surgeon: Rodney Vega MD;  Location: [facility] MAIN OR;  Service: Orthopedics;  Laterality: Right;   • UPPER GASTROINTESTINAL ENDOSCOPY  2020       Family History   Problem Relation Age of Onset   • Emphysema Father    • Inflammatory bowel disease Maternal Aunt         Cousin Neema   • Malig Hyperthermia Neg Hx        Social History     Socioeconomic History   • Marital status:    Tobacco Use   • Smoking status: Never   • Smokeless tobacco: Never   Vaping Use   • Vaping Use: Never used   Substance and Sexual Activity   • Alcohol use: Not Currently     Comment: RARELY   • Drug use: No   • Sexual activity: Defer       Allergies   Allergen Reactions   • Kiwi Extract Itching and Swelling   • Latex Itching     RASH   • Gluten Meal Other (See Comments)     \"bad GI cramps\"       Current Outpatient Medications on File Prior to Visit   Medication Sig Dispense Refill   • albuterol sulfate  (90 Base) MCG/ACT inhaler Inhale 2 puffs.     • Fluticasone-Salmeterol (ADVAIR/WIXELA) 100-50 MCG/ACT DISKUS Inhale 1 puff.     • hydroCHLOROthiazide (HYDRODIURIL) 12.5 MG tablet Take 12.5 mg by mouth Daily.     • hyoscyamine (LEVSIN) 0.125 MG SL tablet Take 1 tablet by mouth 4 (Four) Times a Day With Meals & at Bedtime. 120 tablet 11   • lisinopril (PRINIVIL,ZESTRIL) 10 MG tablet Take 10 mg by mouth Daily.     • loratadine (CLARITIN) 10 MG tablet Take 10 mg by mouth Daily As Needed.     • meloxicam (MOBIC) 15 MG tablet Take 15 mg by mouth Daily.     • omeprazole (priLOSEC) 40 MG capsule Take 40 mg by mouth 2 (two) " "times a day.     • aspirin 81 MG EC tablet Take 1 tablet by mouth 2 (two) times a day. 60 tablet 1   • ibuprofen (ADVIL,MOTRIN) 800 MG tablet Take 800 mg by mouth Every 6 (Six) Hours As Needed for Mild Pain .     • ondansetron (Zofran) 4 MG tablet Take 1 tablet by mouth Every 8 (Eight) Hours As Needed for Nausea or Vomiting. 12 tablet 0     Current Facility-Administered Medications on File Prior to Visit   Medication Dose Route Frequency Provider Last Rate Last Admin   • Chlorhexidine Gluconate 2 % pads 1 each  1 each Apply externally Take As Directed Rodney Vega MD           Review of Systems     Objective   Vital Signs:   /86   Pulse 67   Temp 97.1 °F (36.2 °C)   Ht 182.9 cm (72\")   Wt (!) 140 kg (308 lb)   BMI 41.77 kg/m²       Physical Exam  Vitals and nursing note reviewed.   Constitutional:       General: He is not in acute distress.     Appearance: Normal appearance. He is obese. He is not ill-appearing.   HENT:      Head: Normocephalic and atraumatic.      Right Ear: External ear normal.      Left Ear: External ear normal.   Eyes:      General: No scleral icterus.     Conjunctiva/sclera: Conjunctivae normal.      Pupils: Pupils are equal, round, and reactive to light.   Cardiovascular:      Rate and Rhythm: Normal rate and regular rhythm.      Heart sounds: Normal heart sounds.   Pulmonary:      Effort: Pulmonary effort is normal.      Breath sounds: Normal breath sounds.   Abdominal:      General: Abdomen is flat. Bowel sounds are normal. There is no distension.      Palpations: Abdomen is soft.      Tenderness: There is no abdominal tenderness. There is no guarding or rebound.   Musculoskeletal:      Cervical back: Normal range of motion and neck supple.   Skin:     General: Skin is warm and dry.   Neurological:      Mental Status: He is alert and oriented to person, place, and time.   Psychiatric:         Mood and Affect: Mood normal.         Behavior: Behavior normal.          Result " Review :       Common labs    Common Labs 3/30/22 3/30/22 3/30/22    0921 0921 0921   Glucose   111 (A)   BUN   17   Creatinine   1.15   Sodium   137   Potassium   4.6   Chloride   103   Calcium   10.1   Albumin   4.6   Total Bilirubin   0.7   Alkaline Phosphatase   83   AST (SGOT)   22   ALT (SGPT)   30   Hemoglobin A1C 5.6     PSA  0.63    (A) Abnormal value       Comments are available for some flowsheets but are not being displayed.                               Assessment and Plan    Diagnoses and all orders for this visit:    1. Gastroesophageal reflux disease without esophagitis (Primary)    2. Lower abdominal pain      · Heartburn well controlled with 40 mg pantoprazole daily, continue.  He may use a second dose as needed for breakthrough symptoms.  Antireflux measures and dietary modifications reviewed. Low acid diet reviewed. Keep head of bed elevated. Stop eating/drinking at least 3 hours prior to bedtime. Eliminate caffeine and carbonated beverages.  Weight loss encouraged if BMI over 25.  · As needed Levsin.  · Continue to avoid trigger foods.  · Recommend trial of peppermint altoids to see if this will aid cramping abdominal pain.  · Follow-up in 6 months, sooner if necessary      Follow Up   Return in about 6 months (around 7/11/2023).    Dragon dictation used throughout this note.     RON Muñoz

## 2023-01-12 RX ORDER — OMEPRAZOLE 40 MG/1
40 CAPSULE, DELAYED RELEASE ORAL 2 TIMES DAILY
Qty: 60 CAPSULE | Refills: 11 | Status: SHIPPED | OUTPATIENT
Start: 2023-01-12

## 2023-01-19 ENCOUNTER — TELEPHONE (OUTPATIENT)
Dept: ORTHOPEDIC SURGERY | Facility: CLINIC | Age: 58
End: 2023-01-19

## 2023-01-19 NOTE — TELEPHONE ENCOUNTER
Caller: Maurizio Mcnair    Relationship to patient: Self    Best call back number:     Chief complaint: RIGHT SHOULDER    Type of visit: CORTISONE INJECTION    Requested date: 1/23/23

## 2023-01-23 ENCOUNTER — OFFICE VISIT (OUTPATIENT)
Dept: ORTHOPEDIC SURGERY | Facility: CLINIC | Age: 58
End: 2023-01-23
Payer: COMMERCIAL

## 2023-01-23 VITALS — WEIGHT: 300.1 LBS | HEIGHT: 72 IN | TEMPERATURE: 97.3 F | BODY MASS INDEX: 40.65 KG/M2

## 2023-01-23 DIAGNOSIS — G89.29 CHRONIC PAIN OF RIGHT KNEE: Primary | ICD-10-CM

## 2023-01-23 DIAGNOSIS — M25.561 CHRONIC PAIN OF RIGHT KNEE: Primary | ICD-10-CM

## 2023-01-23 PROCEDURE — 99212 OFFICE O/P EST SF 10 MIN: CPT | Performed by: ORTHOPAEDIC SURGERY

## 2023-01-23 PROCEDURE — 73562 X-RAY EXAM OF KNEE 3: CPT | Performed by: ORTHOPAEDIC SURGERY

## 2023-01-23 NOTE — PROGRESS NOTES
"Maurizio Mcnair     : 1965     MRN: [id number]    DATE: 2023    DIAGNOSIS:  Annual follow up right total knee arthroplasty    SUBJECTIVE:  Patient returns today for annual follow up of a right total knee replacement. Patient reports doing well with no unusual complaints. Denies any limitations due to the knee.    OBJECTIVE:  Temp 97.3 °F (36.3 °C)   Ht 182.9 cm (72\")   Wt (!) 136 kg (300 lb 1.6 oz)   BMI 40.70 kg/m²   Family History   Problem Relation Age of Onset   • Emphysema Father    • Inflammatory bowel disease Maternal Aunt         Cousin Neema   • Malig Hyperthermia Neg Hx      Past Medical History:   Diagnosis Date   • Acute torn meniscus     RT KNEE   • Asthma    • Dysphagia    • GERD (gastroesophageal reflux disease)    • Hernia    • Hypertension    • Irritable bowel syndrome    • Lactose intolerance    • Limited mobility     RIGHT KNEE    • Pneumonia    • Right knee pain    • Seasonal allergies    • Sleep apnea     C-PAP   • Weakness     RIGHT KNEE     Past Surgical History:   Procedure Laterality Date   • ABDOMINAL SURGERY     • CATARACT EXTRACTION Bilateral 2017   • COLONOSCOPY     • CYST REMOVAL      MID ABD-AROUND BELLY BUTTON    • ESOPHAGEAL DILATATION     • HERNIA REPAIR Right 1988   • HERNIA REPAIR      UMBILICAL   • KNEE ARTHROSCOPY Right 01/15/2019    Procedure: Knee Arthroscopy with partial medial lateral Menisectomy;  Surgeon: Rodney Vega MD;  Location: [facility];  Service: Orthopedics   • KNEE ARTHROSCOPY Right 2019    Procedure: Knee Arthroscopy with Menisectomy;  Surgeon: Rodney Vega MD;  Location: [facility];  Service: Orthopedics   • NASAL POLYP EXCISION  10/2016    SEPTOPLASTY   • TONSILLECTOMY     • TOTAL KNEE ARTHROPLASTY Right 2021    Procedure: TOTAL KNEE ARTHROPLASTY;  Surgeon: Rodney Vega MD;  Location: [facility] OR;  Service: Orthopedics;  Laterality: Right;   • UPPER GASTROINTESTINAL ENDOSCOPY   "     Social History     Socioeconomic History   • Marital status:    Tobacco Use   • Smoking status: Never   • Smokeless tobacco: Never   Vaping Use   • Vaping Use: Never used   Substance and Sexual Activity   • Alcohol use: Not Currently     Comment: RARELY   • Drug use: No   • Sexual activity: Defer     Review of Systems:   A 10 point review of systems is reviewed with the patient.  Pertinent positives are listed above.  All others negative.    Exam:  The incision is well healed.  Range of motion is measured at 0 to 125. Alignment is neutral.  Good quad strength. There is no evidence of varus/valgus or flexion instability. No effusion.  Intact sensation to light touch.  Palpable pedal pulses    DIAGNOSTIC STUDIES    Xrays: AP, merchant and lateral views of the right knee were ordered and reviewed for evaluation of recent knee replacement.  The x-rays demonstrate a well positioned, well aligned knee replacement without complicating factors noted.  In comparison with previous films there has been no change.    ASSESSMENT:  Annual follow up right knee replacement.    PLAN: Appropriate activity modifications and restrictions discussed.  Antibiotic prophylaxis recommendations discussed.  OK to follow up as needed.    Rodney Vega MD

## 2023-11-01 ENCOUNTER — TELEPHONE (OUTPATIENT)
Dept: GASTROENTEROLOGY | Facility: CLINIC | Age: 58
End: 2023-11-01
Payer: COMMERCIAL

## 2023-11-01 ENCOUNTER — OFFICE VISIT (OUTPATIENT)
Dept: GASTROENTEROLOGY | Facility: CLINIC | Age: 58
End: 2023-11-01
Payer: COMMERCIAL

## 2023-11-01 VITALS
DIASTOLIC BLOOD PRESSURE: 77 MMHG | HEIGHT: 72 IN | WEIGHT: 307 LBS | HEART RATE: 79 BPM | TEMPERATURE: 97.5 F | SYSTOLIC BLOOD PRESSURE: 109 MMHG | BODY MASS INDEX: 41.58 KG/M2

## 2023-11-01 DIAGNOSIS — R10.13 EPIGASTRIC PAIN: Primary | ICD-10-CM

## 2023-11-01 DIAGNOSIS — R19.5 DARK STOOLS: ICD-10-CM

## 2023-11-01 PROCEDURE — 99214 OFFICE O/P EST MOD 30 MIN: CPT | Performed by: NURSE PRACTITIONER

## 2023-11-01 RX ORDER — PANTOPRAZOLE SODIUM 40 MG/1
40 TABLET, DELAYED RELEASE ORAL 2 TIMES DAILY
Qty: 180 TABLET | Refills: 3 | Status: SHIPPED | OUTPATIENT
Start: 2023-11-01

## 2023-11-01 NOTE — TELEPHONE ENCOUNTER
"Hub staff attempted to follow warm transfer process and was unsuccessful     Caller: Maurizio Mcnair \"KEERTHI\"    Relationship to patient: Self    Best call back number: [phone]    Patient is needing: PT IS REQUESTING SAME DAY APPT FOR PAIN IN STOMACH IMMEDIATELY WHEN EAT AND FOR THE LAST FEW DAYS BOWEL HAS BEEN ALL BLACK.    PLEASE CALL TO SCHEDULE.      "

## 2023-11-01 NOTE — PROGRESS NOTES
Chief Complaint   Patient presents with    Abdominal Pain       HPI    Maurizio Mcnair is a  58 y.o. male here for a follow up visit for abdominal pain.    This patient follows with Dr. Gale, new to me.    Patient reports approximately 1 week of epigastric pain and nausea triggered by eating.  He is at increased dyspeptic symptoms as well.  Subsequently reports development of a dark stool.  Denies bright red blood per rectum.  No lower abdominal pain or rectal pain.  He is on omeprazole once daily.  Has been on this particular PPI for at least 10 years.  Additionally reports 2 days of diarrhea.     No recent labs.    His last EGD was 2019 with esophageal ring, hiatal hernia with Lucian erosions and gastric polyps.    Reports normal colonoscopy in 2017.    Past Medical History:   Diagnosis Date    Acute torn meniscus     RT KNEE    Asthma     Dysphagia     GERD (gastroesophageal reflux disease)     Hernia 1990    Hypertension     Irritable bowel syndrome 2018    Lactose intolerance 2020    Limited mobility     RIGHT KNEE     Pneumonia     Right knee pain     Seasonal allergies     Sleep apnea     C-PAP    Weakness     RIGHT KNEE       Past Surgical History:   Procedure Laterality Date    ABDOMINAL SURGERY  1990    CATARACT EXTRACTION Bilateral 04/2017    COLONOSCOPY      CYST REMOVAL      MID ABD-AROUND BELLY BUTTON     ESOPHAGEAL DILATATION      HERNIA REPAIR Right 06/1988    HERNIA REPAIR      UMBILICAL    KNEE ARTHROSCOPY Right 01/15/2019    Procedure: Knee Arthroscopy with partial medial lateral Menisectomy;  Surgeon: Rodney Vega MD;  Location: [facility];  Service: Orthopedics    KNEE ARTHROSCOPY Right 12/03/2019    Procedure: Knee Arthroscopy with Menisectomy;  Surgeon: Rodney Vega MD;  Location: [facility] OR [facility];  Service: Orthopedics    NASAL POLYP EXCISION  10/2016    SEPTOPLASTY    TONSILLECTOMY      TOTAL KNEE ARTHROPLASTY Right 05/06/2021    Procedure: TOTAL KNEE ARTHROPLASTY;   "Surgeon: Rodney Vega MD;  Location: [facility] OR;  Service: Orthopedics;  Laterality: Right;    UPPER GASTROINTESTINAL ENDOSCOPY  2020       Scheduled Meds:     Continuous Infusions: No current facility-administered medications for this visit.      PRN Meds:     Allergies   Allergen Reactions    Kiwi Extract Itching and Swelling    Latex Itching     RASH    Meat Extract GI Intolerance     Red Meat    Gluten Meal Other (See Comments)     \"bad GI cramps\"       Social History     Socioeconomic History    Marital status:    Tobacco Use    Smoking status: Never    Smokeless tobacco: Never   Vaping Use    Vaping Use: Never used   Substance and Sexual Activity    Alcohol use: Not Currently     Comment: RARELY    Drug use: No    Sexual activity: Defer       Family History   Problem Relation Age of Onset    Emphysema Father     Inflammatory bowel disease Maternal Aunt         Cousin Neema Dave Hyperthermia Neg Hx        Review of Systems   Gastrointestinal:  Positive for abdominal pain and diarrhea.       Vitals:    11/01/23 1027   BP: 109/77   Pulse: 79   Temp: 97.5 °F (36.4 °C)       Physical Exam  Constitutional:       Appearance: He is well-developed.   Abdominal:      General: Bowel sounds are normal. There is no distension.      Palpations: Abdomen is soft. There is no mass.      Tenderness: There is no abdominal tenderness. There is no guarding.      Hernia: No hernia is present.   Skin:     General: Skin is warm and dry.      Capillary Refill: Capillary refill takes less than 2 seconds.   Neurological:      Mental Status: He is alert and oriented to person, place, and time.   Psychiatric:         Behavior: Behavior normal.     Assessment    Diagnoses and all orders for this visit:    1. Epigastric pain (Primary)  -     CBC (No Diff)  -     Iron and TIBC  -     Ferritin  -     Comprehensive Metabolic Panel    2. Dark stools  -     CBC (No Diff)  -     Iron and TIBC  -     Ferritin  -     " Comprehensive Metabolic Panel    Other orders  -     pantoprazole (PROTONIX) 40 MG EC tablet; Take 1 tablet by mouth 2 (Two) Times a Day.  Dispense: 180 tablet; Refill: 3      Plan    Stop Prilosec  Start Protonix 40 mg p.o. twice daily  Labs today as above  Further recommendations pending serology         RAY William  [facility]  [address] [place] [address]  Office: [phone]

## 2023-11-01 NOTE — TELEPHONE ENCOUNTER
Patient's appointment scheduled for today at 1030 with Katie.   Patient verb understanding.   Update to Holly and Alexia Chaudhry.

## 2023-11-02 ENCOUNTER — TELEPHONE (OUTPATIENT)
Dept: GASTROENTEROLOGY | Facility: CLINIC | Age: 58
End: 2023-11-02
Payer: COMMERCIAL

## 2023-11-02 LAB
ALBUMIN SERPL-MCNC: 4.3 G/DL (ref 3.5–5.2)
ALBUMIN/GLOB SERPL: 2 G/DL
ALP SERPL-CCNC: 74 U/L (ref 39–117)
ALT SERPL-CCNC: 26 U/L (ref 1–41)
AST SERPL-CCNC: 19 U/L (ref 1–40)
BILIRUB SERPL-MCNC: 0.6 MG/DL (ref 0–1.2)
BUN SERPL-MCNC: 16 MG/DL (ref 6–20)
BUN/CREAT SERPL: 13.8 (ref 7–25)
CALCIUM SERPL-MCNC: 9.9 MG/DL (ref 8.6–10.5)
CHLORIDE SERPL-SCNC: 103 MMOL/L (ref 98–107)
CO2 SERPL-SCNC: 26.3 MMOL/L (ref 22–29)
CREAT SERPL-MCNC: 1.16 MG/DL (ref 0.76–1.27)
EGFRCR SERPLBLD CKD-EPI 2021: 73.9 ML/MIN/1.73
ERYTHROCYTE [DISTWIDTH] IN BLOOD BY AUTOMATED COUNT: 12.8 % (ref 12.3–15.4)
FERRITIN SERPL-MCNC: 76.8 NG/ML (ref 30–400)
GLOBULIN SER CALC-MCNC: 2.1 GM/DL
GLUCOSE SERPL-MCNC: 106 MG/DL (ref 65–99)
HCT VFR BLD AUTO: 44.6 % (ref 37.5–51)
HGB BLD-MCNC: 14.8 G/DL (ref 13–17.7)
IRON SATN MFR SERPL: 28 % (ref 20–50)
IRON SERPL-MCNC: 117 MCG/DL (ref 59–158)
MCH RBC QN AUTO: 28.1 PG (ref 26.6–33)
MCHC RBC AUTO-ENTMCNC: 33.2 G/DL (ref 31.5–35.7)
MCV RBC AUTO: 84.8 FL (ref 79–97)
PLATELET # BLD AUTO: 235 10*3/MM3 (ref 140–450)
POTASSIUM SERPL-SCNC: 4.2 MMOL/L (ref 3.5–5.2)
PROT SERPL-MCNC: 6.4 G/DL (ref 6–8.5)
RBC # BLD AUTO: 5.26 10*6/MM3 (ref 4.14–5.8)
SODIUM SERPL-SCNC: 138 MMOL/L (ref 136–145)
TIBC SERPL-MCNC: 423 MCG/DL
UIBC SERPL-MCNC: 306 MCG/DL (ref 112–346)
WBC # BLD AUTO: 7.6 10*3/MM3 (ref 3.4–10.8)

## 2023-11-02 NOTE — PROGRESS NOTES
Please inform the patient his lab work is completely normal which is reassuring.  Continue with PPI therapy at twice daily dosing.  Would offer upper GI series for further evaluation if he is agreeable.

## 2023-11-02 NOTE — TELEPHONE ENCOUNTER
Called pt and advised of Katie NP's note.  Pt verbalized understanding.    Pt does not wish to have UGI at this time.

## 2023-11-02 NOTE — TELEPHONE ENCOUNTER
----- Message from RAY William sent at 11/2/2023  9:11 AM EDT -----  Please inform the patient his lab work is completely normal which is reassuring.  Continue with PPI therapy at twice daily dosing.  Would offer upper GI series for further evaluation if he is agreeable.

## 2023-11-29 DIAGNOSIS — R10.13 DYSPEPSIA: ICD-10-CM

## 2023-11-29 DIAGNOSIS — R10.13 EPIGASTRIC PAIN: Primary | ICD-10-CM

## 2023-11-29 DIAGNOSIS — K21.9 GASTROESOPHAGEAL REFLUX DISEASE WITHOUT ESOPHAGITIS: ICD-10-CM

## 2023-12-01 ENCOUNTER — TELEPHONE (OUTPATIENT)
Dept: GASTROENTEROLOGY | Facility: CLINIC | Age: 58
End: 2023-12-01
Payer: COMMERCIAL

## 2023-12-04 ENCOUNTER — TELEPHONE (OUTPATIENT)
Dept: GASTROENTEROLOGY | Facility: CLINIC | Age: 58
End: 2023-12-04
Payer: COMMERCIAL

## 2023-12-04 NOTE — TELEPHONE ENCOUNTER
PT IS RETURNING YOUR CALL TO SCHEDULE HIS PROCEDURE. HE IS A  AND SOMETIMES ITS HARD TO REACH HIM. SO PLEASE KEEP TRYING. HE WOULD LIKE A CALL BACK. THANK YOU

## 2023-12-05 ENCOUNTER — TELEPHONE (OUTPATIENT)
Dept: GASTROENTEROLOGY | Facility: CLINIC | Age: 58
End: 2023-12-05
Payer: COMMERCIAL

## 2023-12-05 PROBLEM — R10.13 EPIGASTRIC PAIN: Status: ACTIVE | Noted: 2023-11-29

## 2023-12-05 PROBLEM — R10.13 DYSPEPSIA: Status: ACTIVE | Noted: 2023-11-29

## 2023-12-05 PROBLEM — K21.9 GASTROESOPHAGEAL REFLUX DISEASE WITHOUT ESOPHAGITIS: Status: ACTIVE | Noted: 2023-11-29

## 2024-03-26 ENCOUNTER — TELEPHONE (OUTPATIENT)
Dept: GASTROENTEROLOGY | Facility: CLINIC | Age: 59
End: 2024-03-26

## 2024-03-26 NOTE — TELEPHONE ENCOUNTER
Caller: ANNCHANA LORRAINE    Relationship: Emergency Contact    Best call back number: [phone]    What is the best time to reach you: ANYTIME    Who are you requesting to speak with (clinical staff, provider,  specific staff member): FRONT      What was the call regarding: PATIENT IS SCHEDULED FOR BOTH SCOPES AND IS NEEDING HIS PREP INSTRUCTIONS. PLEASE CALL HIS WIFE LORRAINE BACK.

## 2024-03-26 NOTE — TELEPHONE ENCOUNTER
Caller: ANNCHANA LORRAINE    Relationship: Emergency Contact    Best call back number: [phone]    What is the best time to reach you: ANYTIME    Who are you requesting to speak with (clinical staff, provider,  specific staff member): FRONT      What was the call regarding: PATIENT IS SCHEDULED FOR BOTH SCOPES AND IS NEEDING HIS PREP INSTRUCTIONS. PLEASE CALL HIS WIFE LORRAINE BACK.

## 2024-03-27 ENCOUNTER — TELEPHONE (OUTPATIENT)
Dept: GASTROENTEROLOGY | Facility: CLINIC | Age: 59
End: 2024-03-27

## 2024-03-27 RX ORDER — OMEPRAZOLE 40 MG/1
1 CAPSULE, DELAYED RELEASE ORAL EVERY 12 HOURS SCHEDULED
Status: ON HOLD | COMMUNITY
Start: 2023-12-16 | End: 2024-03-28

## 2024-03-27 NOTE — TELEPHONE ENCOUNTER
Provider: DR ZULETA    Caller: KOFI PEREZ    Relationship to Patient: SELF    Pharmacy:     Phone Number: [phone]    Reason for Call: PT IS HAVING CLS AND EGD 3/28/24 AND NEED PREP INSTRUCTIONS ADDED TO MY CHART HE ONLY RECEIVED FIRST TWO PAGES.    PT WANTS TO KNOW IF START DULCOLAX AT NOON HOW LONG WILL TAKE TO GO THROUGH SYSTEM HIS IS DRIVING CURRENTLY.      PERMISSION TO RESPOND ON Tenebril

## 2024-03-28 ENCOUNTER — ANESTHESIA (OUTPATIENT)
Dept: GASTROENTEROLOGY | Facility: HOSPITAL | Age: 59
End: 2024-03-28
Payer: COMMERCIAL

## 2024-03-28 ENCOUNTER — ANESTHESIA EVENT (OUTPATIENT)
Dept: GASTROENTEROLOGY | Facility: HOSPITAL | Age: 59
End: 2024-03-28
Payer: COMMERCIAL

## 2024-03-28 ENCOUNTER — HOSPITAL ENCOUNTER (OUTPATIENT)
Facility: HOSPITAL | Age: 59
Setting detail: HOSPITAL OUTPATIENT SURGERY
Discharge: HOME | End: 2024-03-28
Attending: INTERNAL MEDICINE | Admitting: INTERNAL MEDICINE
Payer: COMMERCIAL

## 2024-03-28 VITALS
WEIGHT: 305 LBS | DIASTOLIC BLOOD PRESSURE: 77 MMHG | BODY MASS INDEX: 41.31 KG/M2 | SYSTOLIC BLOOD PRESSURE: 127 MMHG | HEART RATE: 66 BPM | HEIGHT: 72 IN | RESPIRATION RATE: 16 BRPM | OXYGEN SATURATION: 94 %

## 2024-03-28 DIAGNOSIS — K21.9 GASTROESOPHAGEAL REFLUX DISEASE WITHOUT ESOPHAGITIS: ICD-10-CM

## 2024-03-28 DIAGNOSIS — R10.13 EPIGASTRIC PAIN: ICD-10-CM

## 2024-03-28 DIAGNOSIS — R10.13 DYSPEPSIA: ICD-10-CM

## 2024-03-28 PROCEDURE — 88305 TISSUE EXAM BY PATHOLOGIST: CPT | Performed by: INTERNAL MEDICINE

## 2024-03-28 PROCEDURE — 25810000003 LACTATED RINGERS PER 1000 ML: Performed by: INTERNAL MEDICINE

## 2024-03-28 PROCEDURE — 43239 EGD BIOPSY SINGLE/MULTIPLE: CPT | Performed by: INTERNAL MEDICINE

## 2024-03-28 PROCEDURE — 25010000002 PROPOFOL 10 MG/ML EMULSION: Performed by: ANESTHESIOLOGY

## 2024-03-28 RX ORDER — PROPOFOL 10 MG/ML
VIAL (ML) INTRAVENOUS AS NEEDED
Status: DISCONTINUED | OUTPATIENT
Start: 2024-03-28 | End: 2024-03-28 | Stop reason: SURG

## 2024-03-28 RX ORDER — LIDOCAINE HYDROCHLORIDE 20 MG/ML
INJECTION, SOLUTION INFILTRATION; PERINEURAL AS NEEDED
Status: DISCONTINUED | OUTPATIENT
Start: 2024-03-28 | End: 2024-03-28 | Stop reason: SURG

## 2024-03-28 RX ORDER — SODIUM CHLORIDE, SODIUM LACTATE, POTASSIUM CHLORIDE, CALCIUM CHLORIDE 600; 310; 30; 20 MG/100ML; MG/100ML; MG/100ML; MG/100ML
30 INJECTION, SOLUTION INTRAVENOUS CONTINUOUS PRN
Status: DISCONTINUED | OUTPATIENT
Start: 2024-03-28 | End: 2024-03-28 | Stop reason: HOSPADM

## 2024-03-28 RX ORDER — OMEPRAZOLE 40 MG/1
40 CAPSULE, DELAYED RELEASE ORAL DAILY
Qty: 90 CAPSULE | Refills: 2 | Status: SHIPPED | OUTPATIENT
Start: 2024-03-28

## 2024-03-28 RX ADMIN — SODIUM CHLORIDE, POTASSIUM CHLORIDE, SODIUM LACTATE AND CALCIUM CHLORIDE 30 ML/HR: 600; 310; 30; 20 INJECTION, SOLUTION INTRAVENOUS at 08:54

## 2024-03-28 RX ADMIN — PROPOFOL 80 MG: 10 INJECTION, EMULSION INTRAVENOUS at 09:34

## 2024-03-28 RX ADMIN — PROPOFOL 100 MG: 10 INJECTION, EMULSION INTRAVENOUS at 09:28

## 2024-03-28 RX ADMIN — LIDOCAINE HYDROCHLORIDE 60 MG: 20 INJECTION, SOLUTION INFILTRATION; PERINEURAL at 09:28

## 2024-03-28 NOTE — H&P
[facility]  Pre Procedure History & Physical    Chief Complaint:   Epigastric pain    Subjective     HPI:   58 y.o. male here for EGD for evaluation of epigastric pain    Past Medical History:   Past Medical History:   Diagnosis Date    Acute torn meniscus     RT KNEE    Asthma     Dysphagia     GERD (gastroesophageal reflux disease)     Hernia 1990    Hypertension     Irritable bowel syndrome 2018    Lactose intolerance 2020    Limited mobility     RIGHT KNEE     Pneumonia     Right knee pain     Seasonal allergies     Sleep apnea     C-PAP    Weakness     RIGHT KNEE       Past Surgical History:  Past Surgical History:   Procedure Laterality Date    ABDOMINAL SURGERY  1990    CATARACT EXTRACTION Bilateral 04/2017    COLONOSCOPY      CYST REMOVAL      MID ABD-AROUND BELLY BUTTON     ESOPHAGEAL DILATATION      HERNIA REPAIR Right 06/1988    HERNIA REPAIR      UMBILICAL    KNEE ARTHROSCOPY Right 01/15/2019    Procedure: Knee Arthroscopy with partial medial lateral Menisectomy;  Surgeon: Rodney Vega MD;  Location: [facility] OR [facility];  Service: Orthopedics    KNEE ARTHROSCOPY Right 12/03/2019    Procedure: Knee Arthroscopy with Menisectomy;  Surgeon: Rodney Vega MD;  Location: [facility] OR [facility];  Service: Orthopedics    NASAL POLYP EXCISION  10/2016    SEPTOPLASTY    TONSILLECTOMY      TOTAL KNEE ARTHROPLASTY Right 05/06/2021    Procedure: TOTAL KNEE ARTHROPLASTY;  Surgeon: Rodney Vega MD;  Location: [facility] OR;  Service: Orthopedics;  Laterality: Right;    UPPER GASTROINTESTINAL ENDOSCOPY  2020       Family History:  Family History   Problem Relation Age of Onset    Emphysema Father     Inflammatory bowel disease Maternal Aunt         Cousin Neema Dave Hyperthermia Neg Hx        Social History:   reports that he has never smoked. He has never used smokeless tobacco. He reports that he does not currently use alcohol. He reports that he does not use drugs.    Medications:  "  Medications Prior to Admission   Medication Sig Dispense Refill Last Dose    Fluticasone-Salmeterol (ADVAIR/WIXELA) 100-50 MCG/ACT DISKUS Inhale 1 puff Daily.   Past Month    hydroCHLOROthiazide (HYDRODIURIL) 12.5 MG tablet Take 1 tablet by mouth Daily.   3/27/2024    lisinopril (PRINIVIL,ZESTRIL) 10 MG tablet Take 1 tablet by mouth Daily.   3/27/2024    meloxicam (MOBIC) 15 MG tablet Take 1 tablet by mouth As Needed.   Past Month    omeprazole (priLOSEC) 40 MG capsule Take 1 capsule by mouth Every 12 (Twelve) Hours.   3/27/2024    loratadine (CLARITIN) 10 MG tablet Take 1 tablet by mouth Daily As Needed.   More than a month       Allergies:  Kiwi extract, Latex, Meat extract, and Gluten meal    ROS:    Pertinent items are noted in HPI     Objective     Blood pressure 148/84, pulse 64, resp. rate 16, height 182.9 cm (72\"), weight (!) 138 kg (305 lb), SpO2 97%.    Physical Exam   Constitutional: Pt is oriented to person, place, and time and well-developed, well-nourished, and in no distress.   Mouth/Throat: Oropharynx is clear and moist.   Neck: Normal range of motion.   Cardiovascular: Normal rate, regular rhythm and normal heart sounds.    Pulmonary/Chest: Effort normal and breath sounds normal.   Abdominal: Soft. Nontender  Skin: Skin is warm and dry.   Psychiatric: Mood, memory, affect and judgment normal.     Assessment & Plan     Diagnosis:  Epigastric pain    Anticipated Surgical Procedure:  EGD    The risks, benefits, and alternatives of this procedure have been discussed with the patient or the responsible party- the patient understands and agrees to proceed.                                                            "

## 2024-03-28 NOTE — ANESTHESIA PREPROCEDURE EVALUATION
Anesthesia Evaluation                  Airway   Mallampati: III  TM distance: >3 FB  Large neck circumference  Dental - normal exam     Pulmonary    (+) pneumonia , asthma,sleep apnea  Cardiovascular     (+) hypertension      Neuro/Psych  GI/Hepatic/Renal/Endo    (+) obesity, GERD    Musculoskeletal     Abdominal    Substance History      OB/GYN          Other   arthritis,                 Anesthesia Plan    ASA 3     MAC     intravenous induction     Anesthetic plan, risks, benefits, and alternatives have been provided, discussed and informed consent has been obtained with: patient.    CODE STATUS:

## 2024-03-28 NOTE — ANESTHESIA POSTPROCEDURE EVALUATION
Patient: Maurizio Mcnair    Procedure Summary       Date: 03/28/24 Room / Location: [facility] ENDOSCOPY 10 / [facility] ENDOSCOPY    Anesthesia Start: 0925 Anesthesia Stop: 0944    Procedure: ESOPHAGOGASTRODUODENOSCOPY with biopsies (Esophagus) Diagnosis:       Epigastric pain      Gastroesophageal reflux disease without esophagitis      Dyspepsia      (Epigastric pain [R10.13])      (Gastroesophageal reflux disease without esophagitis [K21.9])      (Dyspepsia [R10.13])    Surgeons: Stevie Gale MD Provider: Mary Brar MD    Anesthesia Type: MAC ASA Status: 3            Anesthesia Type: MAC    Vitals  Vitals Value Taken Time   /59 03/28/24 0944   Temp     Pulse 81 03/28/24 0944   Resp 16 03/28/24 0944   SpO2 97 % 03/28/24 0944   Vitals shown include unfiled device data.        Post Anesthesia Care and Evaluation    Patient location during evaluation: bedside  Patient participation: complete - patient participated  Level of consciousness: awake  Pain management: adequate    Airway patency: patent  Anesthetic complications: No anesthetic complications    Cardiovascular status: acceptable  Respiratory status: acceptable  Hydration status: acceptable

## 2024-03-29 LAB
LAB AP CASE REPORT: NORMAL
PATH REPORT.FINAL DX SPEC: NORMAL
PATH REPORT.GROSS SPEC: NORMAL

## 2024-04-03 DIAGNOSIS — R10.13 EPIGASTRIC PAIN: Primary | ICD-10-CM

## 2024-04-26 ENCOUNTER — HOSPITAL ENCOUNTER (OUTPATIENT)
Dept: CT IMAGING | Facility: HOSPITAL | Age: 59
Discharge: HOME | End: 2024-04-26
Admitting: INTERNAL MEDICINE
Payer: COMMERCIAL

## 2024-04-26 DIAGNOSIS — R10.13 EPIGASTRIC PAIN: ICD-10-CM

## 2024-04-26 LAB — CREAT BLDA-MCNC: 1.4 MG/DL (ref 0.6–1.3)

## 2024-04-26 PROCEDURE — 0 DIATRIZOATE MEGLUMINE & SODIUM PER 1 ML: Performed by: INTERNAL MEDICINE

## 2024-04-26 PROCEDURE — 82565 ASSAY OF CREATININE: CPT

## 2024-04-26 PROCEDURE — 74177 CT ABD & PELVIS W/CONTRAST: CPT

## 2024-04-26 PROCEDURE — 25510000001 IOPAMIDOL 61 % SOLUTION: Performed by: INTERNAL MEDICINE

## 2024-04-26 RX ADMIN — DIATRIZOATE MEGLUMINE AND DIATRIZOATE SODIUM 30 ML: 660; 100 LIQUID ORAL; RECTAL at 17:27

## 2024-04-26 RX ADMIN — IOPAMIDOL 85 ML: 612 INJECTION, SOLUTION INTRAVENOUS at 17:27

## 2024-05-08 ENCOUNTER — TELEPHONE (OUTPATIENT)
Dept: GASTROENTEROLOGY | Facility: CLINIC | Age: 59
End: 2024-05-08
Payer: COMMERCIAL

## 2024-05-08 DIAGNOSIS — R10.13 DYSPEPSIA: ICD-10-CM

## 2024-05-08 DIAGNOSIS — R10.13 EPIGASTRIC PAIN: Primary | ICD-10-CM

## 2024-05-09 ENCOUNTER — TELEPHONE (OUTPATIENT)
Dept: GASTROENTEROLOGY | Facility: CLINIC | Age: 59
End: 2024-05-09
Payer: COMMERCIAL

## 2024-05-17 ENCOUNTER — HOSPITAL ENCOUNTER (OUTPATIENT)
Dept: NUCLEAR MEDICINE | Facility: HOSPITAL | Age: 59
Discharge: HOME | End: 2024-05-17
Payer: COMMERCIAL

## 2024-05-17 DIAGNOSIS — R10.13 DYSPEPSIA: ICD-10-CM

## 2024-05-17 DIAGNOSIS — R10.13 EPIGASTRIC PAIN: ICD-10-CM

## 2024-05-17 PROCEDURE — A9537 TC99M MEBROFENIN: HCPCS | Performed by: INTERNAL MEDICINE

## 2024-05-17 PROCEDURE — 25010000002 SINCALIDE PER 5 MCG: Performed by: INTERNAL MEDICINE

## 2024-05-17 PROCEDURE — 78227 HEPATOBIL SYST IMAGE W/DRUG: CPT

## 2024-05-17 PROCEDURE — 0 TECHNETIUM TC 99M MEBROFENIN KIT: Performed by: INTERNAL MEDICINE

## 2024-05-17 RX ORDER — KIT FOR THE PREPARATION OF TECHNETIUM TC 99M MEBROFENIN 45 MG/10ML
1 INJECTION, POWDER, LYOPHILIZED, FOR SOLUTION INTRAVENOUS
Status: COMPLETED | OUTPATIENT
Start: 2024-05-17 | End: 2024-05-17

## 2024-05-17 RX ADMIN — SINCALIDE 2.8 MCG: 5 INJECTION, POWDER, LYOPHILIZED, FOR SOLUTION INTRAVENOUS at 08:15

## 2024-05-17 RX ADMIN — MEBROFENIN 1 DOSE: 45 INJECTION, POWDER, LYOPHILIZED, FOR SOLUTION INTRAVENOUS at 07:09

## 2024-05-22 ENCOUNTER — TELEPHONE (OUTPATIENT)
Dept: GASTROENTEROLOGY | Facility: CLINIC | Age: 59
End: 2024-05-22
Payer: COMMERCIAL

## 2024-05-22 NOTE — TELEPHONE ENCOUNTER
Pt reviewed results via [facility].     Sent pt MyChart msg advising of results and recommendations. Advised to call if any questions.       Stevie Gale MD  [facility] Clinical 2 Pool  Normal HIDA scan  Office f/u with SM 4-6 weeks

## 2024-06-03 ENCOUNTER — TELEPHONE (OUTPATIENT)
Dept: GASTROENTEROLOGY | Facility: CLINIC | Age: 59
End: 2024-06-03
Payer: COMMERCIAL

## 2024-06-03 NOTE — TELEPHONE ENCOUNTER
Called pt, he states a couple of hours ago he had stabbing LLQ pain. States it lasted 1/2 hr - 1 hr, now it just feels sore.  Pt states he has eaten several slices of tomatoes in the past 2 days. Pt denies fever, n/v, constipation and diarrhea. Advised will send a msg to MARIA ALEJANDRA Wilson.

## 2024-06-03 NOTE — TELEPHONE ENCOUNTER
"Caller: Maurizio Mcnair \"KEERTHI\"    Relationship to patient: Self    Best call back number: [phone]     Patient is needing: CLINICAL UPDATE: PT HAS BEEN EXPERIENCING SIGNIFICANTLY LOWER LEFT QUADRANT ABDOMINAL PAIN, PT DESCRIBED PAIN AS \"STABBING.\" SEVERE PAIN STARTED 6/3/24, BUT ABDOMINAL DISCOMFORT HAS BEEN PRESENT FOR SOME TIME. PLEASE REVIEW AND ADVISE TREATMENT OPTIONS.     "

## 2024-06-04 NOTE — TELEPHONE ENCOUNTER
Review of notes reveals that he was worked up previously for epigastric abdominal pain.  I see no mention of left lower quadrant pain.  Can you please clarify with patient if this left lower quadrant pain is different than his previous complaints?  Did he have a fever with it?  Any change in his bowel movements?  Is he doing better at this point?  He has a follow-up with Katie which is fine but he is a Dr. Gale patient.  I do not really have any openings until mid August so changing his appointment now would delay his care by about 5 weeks.    Will also forward this back to Katie to follow answers as I have never seen this patient before.  Currently being followed by Katie and Dr. Gale.

## 2024-06-05 ENCOUNTER — TELEPHONE (OUTPATIENT)
Dept: GASTROENTEROLOGY | Facility: CLINIC | Age: 59
End: 2024-06-05

## 2024-06-05 NOTE — TELEPHONE ENCOUNTER
"        Hub staff attempted to follow warm transfer process and was unsuccessful     Caller: Maurizio Mcnair \"KEERTHI\"    Relationship to patient: Self    Best call back number: [phone]     Patient is needing: PT RETURNING PHONE CALL FROM LEOBARDO IN REGARDS TO RECOMMENDATIONS. PLEASE CALL PT BACK          "

## 2024-06-05 NOTE — TELEPHONE ENCOUNTER
If symptoms persist, I would recommend that he go to the urgent care or ED.  He may need a CT scan to rule out diverticulitis.  The other option would be to see if someone here could get him in or his PCP.

## 2024-06-05 NOTE — TELEPHONE ENCOUNTER
Called pt, he states he is feeling better this morning but still having soreness on his LLQ.  States he had 3 episodes of diarrhea last night.  This morning he had a regular BM, but states there was mucous with it.  Pt states he has not had a fever.   Advised will send an update to RON Gutierrez.

## 2024-06-06 NOTE — TELEPHONE ENCOUNTER
Patient called. No answer. Left message. Advising as per Brenda's note. Advised to call back with questions.

## 2025-01-14 ENCOUNTER — TELEPHONE (OUTPATIENT)
Dept: GASTROENTEROLOGY | Facility: CLINIC | Age: 60
End: 2025-01-14
Payer: COMMERCIAL

## 2025-01-14 DIAGNOSIS — R10.13 EPIGASTRIC PAIN: Primary | ICD-10-CM

## 2025-01-14 DIAGNOSIS — R10.32 LLQ PAIN: ICD-10-CM

## 2025-01-14 NOTE — TELEPHONE ENCOUNTER
Have him come in for a CBC and a CMP and lets get a CT of the abdomen and pelvis with oral and IV contrast.  If symptoms worsen he needs to come to the emergency room.

## 2025-01-14 NOTE — TELEPHONE ENCOUNTER
"    Hub staff attempted to follow warm transfer process and was unsuccessful     Caller: Maurizio Mcnair \"KEERTHI\"    Relationship to patient: Self    Best call back number: [phone] (home)       Patient is needing: RETURNING SHARON CALL  "

## 2025-01-14 NOTE — TELEPHONE ENCOUNTER
Called pt and advised of Katie BESS's note.  Pt verbalized understanding.    Orders placed for labs and CT, sent to MARIA ALEJANDRA Wilson to cosign.

## 2025-01-14 NOTE — TELEPHONE ENCOUNTER
Called pt, he has had episodes of alternating diarrhea and constipation.  Currently he is having small volume hard stools.  Pt is also has a c/o LLQ pain, states it is severe at times. Pt has been eating a soft diet but last night he ate chicken and had the pain in LLQ.  Pt denies fever, chills, n/v, blood in stool. Advised will send a msg to MARIA ALEJANDRA Wilson.

## 2025-01-15 ENCOUNTER — TELEPHONE (OUTPATIENT)
Dept: GASTROENTEROLOGY | Facility: CLINIC | Age: 60
End: 2025-01-15

## 2025-01-15 NOTE — TELEPHONE ENCOUNTER
Pt came into the office wanting to get his FMLA forms filled out.  Forms given to Daphne.  Collected $25 Fmla payment

## 2025-01-16 ENCOUNTER — TELEPHONE (OUTPATIENT)
Dept: GASTROENTEROLOGY | Facility: CLINIC | Age: 60
End: 2025-01-16
Payer: COMMERCIAL

## 2025-01-16 NOTE — TELEPHONE ENCOUNTER
----- Message from Katie Antonio sent at 1/16/2025  3:26 PM EST -----  Please inform the patient lab work shows elevated renal function but better than 8 months ago.  Recommend he drink plenty of water and follow-up with his primary care provider for that.  Normal liver function.  Normal white blood cell count.  Normal   hemoglobin.

## 2025-01-28 RX ORDER — OMEPRAZOLE 40 MG/1
40 CAPSULE, DELAYED RELEASE ORAL DAILY
Qty: 90 CAPSULE | Refills: 3 | Status: SHIPPED | OUTPATIENT
Start: 2025-01-28

## 2025-01-29 ENCOUNTER — HOSPITAL ENCOUNTER (OUTPATIENT)
Dept: CT IMAGING | Facility: HOSPITAL | Age: 60
Discharge: HOME | End: 2025-01-29
Admitting: NURSE PRACTITIONER
Payer: COMMERCIAL

## 2025-01-29 DIAGNOSIS — R10.13 EPIGASTRIC PAIN: ICD-10-CM

## 2025-01-29 DIAGNOSIS — R10.32 LLQ PAIN: ICD-10-CM

## 2025-01-29 PROCEDURE — 74177 CT ABD & PELVIS W/CONTRAST: CPT

## 2025-01-29 PROCEDURE — 25510000002 DIATRIZOATE MEGLUMINE & SODIUM PER 1 ML: Performed by: NURSE PRACTITIONER

## 2025-01-29 PROCEDURE — 25510000001 IOPAMIDOL 61 % SOLUTION: Performed by: NURSE PRACTITIONER

## 2025-01-29 RX ORDER — IOPAMIDOL 612 MG/ML
100 INJECTION, SOLUTION INTRAVASCULAR
Status: COMPLETED | OUTPATIENT
Start: 2025-01-29 | End: 2025-01-29

## 2025-01-29 RX ORDER — DIATRIZOATE MEGLUMINE AND DIATRIZOATE SODIUM 660; 100 MG/ML; MG/ML
30 SOLUTION ORAL; RECTAL
Status: COMPLETED | OUTPATIENT
Start: 2025-01-29 | End: 2025-01-29

## 2025-01-29 RX ADMIN — DIATRIZOATE MEGLUMINE AND DIATRIZOATE SODIUM 30 ML: 660; 100 LIQUID ORAL; RECTAL at 08:30

## 2025-01-29 RX ADMIN — IOPAMIDOL 85 ML: 612 INJECTION, SOLUTION INTRAVENOUS at 09:42

## 2025-01-30 ENCOUNTER — APPOINTMENT (OUTPATIENT)
Dept: GENERAL RADIOLOGY | Facility: HOSPITAL | Age: 60
End: 2025-01-30
Payer: COMMERCIAL

## 2025-01-30 ENCOUNTER — HOSPITAL ENCOUNTER (EMERGENCY)
Facility: HOSPITAL | Age: 60
Discharge: HOME | End: 2025-01-30
Attending: EMERGENCY MEDICINE
Payer: COMMERCIAL

## 2025-01-30 ENCOUNTER — TELEPHONE (OUTPATIENT)
Dept: GASTROENTEROLOGY | Facility: CLINIC | Age: 60
End: 2025-01-30
Payer: COMMERCIAL

## 2025-01-30 VITALS
BODY MASS INDEX: 42 KG/M2 | DIASTOLIC BLOOD PRESSURE: 83 MMHG | TEMPERATURE: 97.9 F | RESPIRATION RATE: 18 BRPM | WEIGHT: 300 LBS | HEIGHT: 71 IN | OXYGEN SATURATION: 94 % | SYSTOLIC BLOOD PRESSURE: 141 MMHG | HEART RATE: 65 BPM

## 2025-01-30 DIAGNOSIS — R93.5 ABNORMAL CT OF THE ABDOMEN: ICD-10-CM

## 2025-01-30 DIAGNOSIS — R10.9 ABDOMINAL PAIN, UNSPECIFIED ABDOMINAL LOCATION: Primary | ICD-10-CM

## 2025-01-30 LAB
ALBUMIN SERPL-MCNC: 4.3 G/DL (ref 3.5–5.2)
ALBUMIN/GLOB SERPL: 1.4 G/DL
ALP SERPL-CCNC: 81 U/L (ref 39–117)
ALT SERPL W P-5'-P-CCNC: 30 U/L (ref 1–41)
ANION GAP SERPL CALCULATED.3IONS-SCNC: 8.5 MMOL/L (ref 5–15)
AST SERPL-CCNC: 17 U/L (ref 1–40)
BASOPHILS # BLD AUTO: 0.04 10*3/MM3 (ref 0–0.2)
BASOPHILS NFR BLD AUTO: 0.4 % (ref 0–1.5)
BILIRUB SERPL-MCNC: 0.6 MG/DL (ref 0–1.2)
BILIRUB UR QL STRIP: NEGATIVE
BUN SERPL-MCNC: 19 MG/DL (ref 6–20)
BUN/CREAT SERPL: 16.7 (ref 7–25)
CALCIUM SPEC-SCNC: 9.8 MG/DL (ref 8.6–10.5)
CHLORIDE SERPL-SCNC: 101 MMOL/L (ref 98–107)
CLARITY UR: CLEAR
CO2 SERPL-SCNC: 25.5 MMOL/L (ref 22–29)
COLOR UR: YELLOW
CREAT SERPL-MCNC: 1.14 MG/DL (ref 0.76–1.27)
D-LACTATE SERPL-SCNC: 0.8 MMOL/L (ref 0.5–2)
DEPRECATED RDW RBC AUTO: 40.4 FL (ref 37–54)
EGFRCR SERPLBLD CKD-EPI 2021: 75 ML/MIN/1.73
EOSINOPHIL # BLD AUTO: 0.19 10*3/MM3 (ref 0–0.4)
EOSINOPHIL NFR BLD AUTO: 2 % (ref 0.3–6.2)
ERYTHROCYTE [DISTWIDTH] IN BLOOD BY AUTOMATED COUNT: 12.8 % (ref 12.3–15.4)
GLOBULIN UR ELPH-MCNC: 3 GM/DL
GLUCOSE SERPL-MCNC: 108 MG/DL (ref 65–99)
GLUCOSE UR STRIP-MCNC: NEGATIVE MG/DL
HCT VFR BLD AUTO: 45.3 % (ref 37.5–51)
HGB BLD-MCNC: 15 G/DL (ref 13–17.7)
HGB UR QL STRIP.AUTO: NEGATIVE
IMM GRANULOCYTES # BLD AUTO: 0.01 10*3/MM3 (ref 0–0.05)
IMM GRANULOCYTES NFR BLD AUTO: 0.1 % (ref 0–0.5)
KETONES UR QL STRIP: NEGATIVE
LEUKOCYTE ESTERASE UR QL STRIP.AUTO: NEGATIVE
LIPASE SERPL-CCNC: 17 U/L (ref 13–60)
LYMPHOCYTES # BLD AUTO: 1.94 10*3/MM3 (ref 0.7–3.1)
LYMPHOCYTES NFR BLD AUTO: 20.6 % (ref 19.6–45.3)
MCH RBC QN AUTO: 28.3 PG (ref 26.6–33)
MCHC RBC AUTO-ENTMCNC: 33.1 G/DL (ref 31.5–35.7)
MCV RBC AUTO: 85.5 FL (ref 79–97)
MONOCYTES # BLD AUTO: 0.6 10*3/MM3 (ref 0.1–0.9)
MONOCYTES NFR BLD AUTO: 6.4 % (ref 5–12)
NEUTROPHILS NFR BLD AUTO: 6.63 10*3/MM3 (ref 1.7–7)
NEUTROPHILS NFR BLD AUTO: 70.5 % (ref 42.7–76)
NITRITE UR QL STRIP: NEGATIVE
PH UR STRIP.AUTO: 5.5 [PH] (ref 5–8)
PLATELET # BLD AUTO: 232 10*3/MM3 (ref 140–450)
PMV BLD AUTO: 9.6 FL (ref 6–12)
POTASSIUM SERPL-SCNC: 3.7 MMOL/L (ref 3.5–5.2)
PROT SERPL-MCNC: 7.3 G/DL (ref 6–8.5)
PROT UR QL STRIP: NEGATIVE
RBC # BLD AUTO: 5.3 10*6/MM3 (ref 4.14–5.8)
SODIUM SERPL-SCNC: 135 MMOL/L (ref 136–145)
SP GR UR STRIP: 1.01 (ref 1–1.03)
UROBILINOGEN UR QL STRIP: NORMAL
WBC NRBC COR # BLD AUTO: 9.41 10*3/MM3 (ref 3.4–10.8)

## 2025-01-30 PROCEDURE — 85025 COMPLETE CBC W/AUTO DIFF WBC: CPT | Performed by: EMERGENCY MEDICINE

## 2025-01-30 PROCEDURE — 80053 COMPREHEN METABOLIC PANEL: CPT | Performed by: EMERGENCY MEDICINE

## 2025-01-30 PROCEDURE — 99284 EMERGENCY DEPT VISIT MOD MDM: CPT | Performed by: EMERGENCY MEDICINE

## 2025-01-30 PROCEDURE — 71045 X-RAY EXAM CHEST 1 VIEW: CPT

## 2025-01-30 PROCEDURE — 81003 URINALYSIS AUTO W/O SCOPE: CPT | Performed by: EMERGENCY MEDICINE

## 2025-01-30 PROCEDURE — 83690 ASSAY OF LIPASE: CPT | Performed by: EMERGENCY MEDICINE

## 2025-01-30 PROCEDURE — 83605 ASSAY OF LACTIC ACID: CPT | Performed by: EMERGENCY MEDICINE

## 2025-01-30 PROCEDURE — 99283 EMERGENCY DEPT VISIT LOW MDM: CPT

## 2025-01-30 NOTE — TELEPHONE ENCOUNTER
Hub staff attempted to follow warm transfer process and was unsuccessful     Caller: DR. NIXON    Relationship to patient:     Best call back number: [phone]    Patient is needing: VERITO WITH RADIOLOGY CALLED IN REQUESTING TO SPEAK WITH DEVON PIPER. VERITO STATED THAT SHE WOULD LIKE FOR DEVON PIPER TO CALL [phone] AND ASK FOR DR. NIXON TO RECEIVE RESULTS FROM CT OF ABDOMEN & PELVIS DONE ON YESTERDAY FOR THE PATIENT. VERITO STATED THAT SHE NEEDS CALL BACK AS SOON AS POSSIBLE.

## 2025-01-30 NOTE — TELEPHONE ENCOUNTER
Spoke with Dr. Mcmahon: CT with Dilated appendix at 12mm, fat stranding. Concern for early appendicitis. I did confirm with patient that pain is in the left mid-epigastrum, no RLQ nor periumbilical pain. No fever. Doubtful appendicitis but will send him to the ED for evaluation especially since not seen if office for this.

## 2025-01-31 NOTE — PROGRESS NOTES
Please contact the patient to check on status of symptoms after he was sent to the emergency room due to concerns of appendicitis.

## 2025-01-31 NOTE — FSED PROVIDER NOTE
"Subjective   History of Present Illness  58yo male pmh significant htn presents ED c/o reported 2wk hx LLQ abdominal discomfort associated nausea/vomiting/diarrhea.  Pt seen outpatient for same with CT abdomen/pelvis performed 01.29.2025 at 0930hrs with final radiology findings concerning for early acute uncomplicated appendicitis.  Pt subsequently notified of results and referred to ED for evaluation.  At time of ED presentation, patient denies abdominal pain.  ROS neg fever/chills/anorexia/chest pain/soa/abd pain.    History provided by:  Patient  Abdominal Pain  Associated symptoms: diarrhea    Associated symptoms: no nausea and no vomiting        Review of Systems   Constitutional: Negative.    HENT: Negative.     Eyes: Negative.    Respiratory: Negative.     Cardiovascular: Negative.    Gastrointestinal:  Positive for abdominal pain and diarrhea. Negative for nausea and vomiting.   Genitourinary: Negative.    Allergic/Immunologic: Negative for immunocompromised state.   Neurological: Negative.    All other systems reviewed and are negative.      Past Medical History:   Diagnosis Date    Acute torn meniscus     RT KNEE    Asthma     Dysphagia     GERD (gastroesophageal reflux disease)     Hernia 1990    Hypertension     Irritable bowel syndrome 2018    Lactose intolerance 2020    Limited mobility     RIGHT KNEE     Pneumonia     Right knee pain     Seasonal allergies     Sleep apnea     C-PAP    Weakness     RIGHT KNEE       Allergies   Allergen Reactions    Kiwi Extract Itching and Swelling    Latex Itching     RASH    Meat Extract GI Intolerance     Red Meat    Gluten Meal Other (See Comments)     \"bad GI cramps\"       Past Surgical History:   Procedure Laterality Date    ABDOMINAL SURGERY  1990    CATARACT EXTRACTION Bilateral 04/2017    COLONOSCOPY      CYST REMOVAL      MID ABD-AROUND BELLY BUTTON     ENDOSCOPY N/A 3/28/2024    Procedure: ESOPHAGOGASTRODUODENOSCOPY with biopsies;  Surgeon: Stevie Gale " MD Renato;  [facility];  Service: Gastroenterology;  Laterality: N/A;  pre: epoigastric pain, dyspepsia  post:    ESOPHAGEAL DILATATION      HERNIA REPAIR Right 06/1988    HERNIA REPAIR      UMBILICAL    KNEE ARTHROSCOPY Right 01/15/2019    Procedure: Knee Arthroscopy with partial medial lateral Menisectomy;  Surgeon: Rodney Vega MD;  Location:  [facility];  Service: Orthopedics    KNEE ARTHROSCOPY Right 12/03/2019    Procedure: Knee Arthroscopy with Menisectomy;  Surgeon: Rodney Vega MD;  Location:  [facility];  Service: Orthopedics    NASAL POLYP EXCISION  10/2016    SEPTOPLASTY    TONSILLECTOMY      TOTAL KNEE ARTHROPLASTY Right 05/06/2021    Procedure: TOTAL KNEE ARTHROPLASTY;  Surgeon: Rodney Vega MD;  Location: [facility] MAIN OR;  Service: Orthopedics;  Laterality: Right;    UPPER GASTROINTESTINAL ENDOSCOPY  2020       Family History   Problem Relation Age of Onset    Emphysema Father     Inflammatory bowel disease Maternal Aunt         Cousin Neema Dave Hyperthermia Neg Hx        Social History     Socioeconomic History    Marital status:    Tobacco Use    Smoking status: Never    Smokeless tobacco: Never   Vaping Use    Vaping status: Never Used   Substance and Sexual Activity    Alcohol use: Not Currently     Comment: RARELY    Drug use: No    Sexual activity: Defer           Objective   Physical Exam  Vitals and nursing note reviewed.   Constitutional:       Appearance: Normal appearance.   HENT:      Head: Normocephalic and atraumatic.      Right Ear: External ear normal.      Left Ear: External ear normal.      Nose: Nose normal.      Mouth/Throat:      Mouth: Mucous membranes are moist.      Pharynx: Oropharynx is clear.   Eyes:      Pupils: Pupils are equal, round, and reactive to light.   Cardiovascular:      Rate and Rhythm: Normal rate and regular rhythm.      Pulses: Normal pulses.      Heart sounds: Normal heart sounds. No murmur heard.     No friction  rub. No gallop.   Pulmonary:      Effort: Pulmonary effort is normal. No respiratory distress.      Breath sounds: Normal breath sounds. No wheezing, rhonchi or rales.   Abdominal:      General: Abdomen is protuberant. Bowel sounds are normal. There is no distension.      Palpations: Abdomen is soft. There is no mass.      Tenderness: There is no abdominal tenderness. There is no right CVA tenderness, left CVA tenderness, guarding or rebound. Negative signs include Perry's sign, Rovsing's sign, McBurney's sign, psoas sign and obturator sign.      Hernia: No hernia is present.   Musculoskeletal:         General: No swelling or deformity. Normal range of motion.      Cervical back: Normal range of motion and neck supple. No rigidity.      Right lower leg: No edema.      Left lower leg: No edema.   Lymphadenopathy:      Cervical: No cervical adenopathy.   Skin:     General: Skin is warm.   Neurological:      General: No focal deficit present.      Mental Status: He is alert and oriented to person, place, and time.      GCS: GCS eye subscore is 4. GCS verbal subscore is 5. GCS motor subscore is 6.      Sensory: Sensation is intact.      Motor: Motor function is intact.         Procedures           ED Course  ED Course as of 01/30/25 2024   Thu Jan 30, 2025 2014 General surgery paged [SD]   2019 D/w Dr. Solis, general surgery. Recommends no further imaging at this time. May discharge home with precautions et office followup. [SD]      ED Course User Index  [SD] Shine Oseguera MD      Labs Reviewed   COMPREHENSIVE METABOLIC PANEL - Abnormal; Notable for the following components:       Result Value    Glucose 108 (*)     Sodium 135 (*)     All other components within normal limits    Narrative:     GFR Categories in Chronic Kidney Disease (CKD)      GFR Category          GFR (mL/min/1.73)    Interpretation  G1                     90 or greater         Normal or high (1)  G2                      60-89                 Mild decrease (1)  G3a                   45-59                Mild to moderate decrease  G3b                   30-44                Moderate to severe decrease  G4                    15-29                Severe decrease  G5                    14 or less           Kidney failure          (1)In the absence of evidence of kidney disease, neither GFR category G1 or G2 fulfill the criteria for CKD.    eGFR calculation 2021 CKD-EPI creatinine equation, which does not include race as a factor   LIPASE - Normal   LACTIC ACID, PLASMA - Normal   URINALYSIS WITHOUT MICROSCOPIC (NO CULTURE) - Normal   CBC WITH AUTO DIFFERENTIAL - Normal   CBC AND DIFFERENTIAL    Narrative:     The following orders were created for panel order CBC & Differential.  Procedure                               Abnormality         Status                     ---------                               -----------         ------                     CBC Auto Differential[[id number]]        Normal              Final result                 Please view results for these tests on the individual orders.     XR Chest 1 View    Result Date: 1/30/2025  Narrative: CXR ONE VIEW  HISTORY: abdominal pain  COMPARISON: None  TECHNIQUE: single portable AP      Impression:  The heart size is within normal limits.  The lungs are normally aerated. There is no pleural effusion or pneumothorax.  There is no evidence of acute bony abnormality.  This report was finalized on 1/30/2025 7:21 PM by Dr. Julio C Rosario M.D on Workstation: [id number]      CT Abdomen Pelvis With Contrast    Result Date: 1/30/2025  Narrative: CT ABDOMEN PELVIS W CONTRAST-  DATE OF EXAM: 1/29/2025 9:30 AM  INDICATION: Left lower quadrant pain and epigastric pain.  COMPARISON: Nuclear medicine hepatobiliary scan 5/17/2024. CT abdomen and pelvis 4/26/2024.  TECHNIQUE: Multiple contiguous axial images were acquired through the abdomen and pelvis following the intravenous administration of 85 mL of  Isovue-300. Reformatted coronal and sagittal sequences were also reviewed. Radiation dose reduction techniques were utilized, including automated exposure control and exposure modulation based on body size.  FINDINGS: Moderate to large hiatal hernia with medial left basilar subsegmental atelectasis and/or scarring. Gastric wall thickening can be accentuated by underdistention but could reflect a nonspecific gastritis.  The liver, gallbladder, spleen, pancreas, adrenal glands, and kidneys are unremarkable. The urinary bladder is nondistended. Diffuse urinary bladder wall thickening could be accentuated by under distention.  Mild colonic stool. Colonic diverticula, without CT evidence of diverticulitis. No bowel obstruction. The base of the appendix is dilated up to approximately 12 mm in diameter. Mild patchy periappendiceal fat stranding in the right lower quadrant. No extraluminal air or adjacent loculated fluid collection to suggest abscess.  No free fluid in the abdomen or pelvis. No free intraperitoneal air. No pathologically enlarged lymph nodes in the abdomen or pelvis.  Small fat-containing umbilical and bilateral inguinal hernias. Similar-appearing multilevel lumbar spondylosis. Mild bilateral hip and SI joint DJD. No acute osseous abnormality or concerning osseous lesion.      Impression:  1. The base of the appendix is dilated up to approximately 12 mm in diameter. Mild patchy appendiceal fat stranding in the right lower quadrant. Findings suggest acute uncomplicated appendicitis. This was discussed over the phone with the covering clinician, Brenda Perry, at 3:57 p.m. on 1/30/2025. The patient will be sent to the ER for evaluation. 2. Urinary bladder wall thickening could be accentuated by under distention. Recommend correlating with urinalysis. 3. Moderate to large hiatal hernia with gastric wall thickening that could be accentuated by underdistention but could reflect a nonspecific gastritis. 4.  Colonic diverticula, without CT evidence of diverticulitis.  This report was finalized on 1/30/2025 3:59 PM by Myles Mcmahon MD on Workstation: [id number]                                          Medical Decision Making  Labs/radiographic findings reviewed.  CBC/CMP/lipase/UA: unremarkable.  CXR: no active disease.  Pt denies abdominal pain upon initial examination as well as discharge examination.  Abdomen soft nontender. Negative rebound/rigidity/guarding/mcburney tenderness/yadav sign/rovsing/psoas.  No leukocytosis.  AFVSS.  Negative SIRS.  CT abdomen/pelvis findings reviewed from 01.29.2025.  consulted general surgery. No further imaging at this time recommended.  Patient without abdominal pain or clinical appendicitis.  Plan discharge home with appendicitis precautions et general surgery followup.  Strict return precautions.    Problems Addressed:  Abdominal pain, unspecified abdominal location: complicated acute illness or injury  Abnormal CT of the abdomen: complicated acute illness or injury    Amount and/or Complexity of Data Reviewed  Labs: ordered.  Radiology: ordered.        Final diagnoses:   Abdominal pain, unspecified abdominal location   Abnormal CT of the abdomen       ED Disposition  ED Disposition       ED Disposition   Discharge    Condition   Good    Comment   --               Abner Solis Jr., MD  [address] [address]  [phone]    In 1 day           Medication List      No changes were made to your prescriptions during this visit.

## 2025-01-31 NOTE — DISCHARGE INSTRUCTIONS
Return ED fever, vomiting, abdominal pain, bleeding, worse condition, any other concerns  Clear liquid diet x24hrs

## 2025-02-03 ENCOUNTER — TELEPHONE (OUTPATIENT)
Dept: GASTROENTEROLOGY | Facility: CLINIC | Age: 60
End: 2025-02-03
Payer: COMMERCIAL

## 2025-02-03 NOTE — TELEPHONE ENCOUNTER
"Called pt, he states he is feeling \"slightly\" better.  States he still is having pain LLQ, nausea every morning and vomited Saturday and Sunday morning. No vomiting today, just nausea.  Pt states he feels better as the day progresses. Denies fever, chills.  Pt reports normal BM's the past two days.  Update sent to MARIA ALEJANDRA Wilson.   "

## 2025-02-03 NOTE — TELEPHONE ENCOUNTER
----- Message from Katie Antonio sent at 1/31/2025  4:11 PM EST -----  Please contact the patient to check on status of symptoms after he was sent to the emergency room due to concerns of appendicitis.

## 2025-02-04 RX ORDER — DICYCLOMINE HYDROCHLORIDE 10 MG/1
10 CAPSULE ORAL 3 TIMES DAILY PRN
Qty: 120 CAPSULE | Refills: 3 | Status: SHIPPED | OUTPATIENT
Start: 2025-02-04

## 2025-02-04 NOTE — TELEPHONE ENCOUNTER
Recommend low residue diet x 1 week (please review this diet with him) and trial of antispasmodic Bentyl which I sent to his pharmacy.  Bentyl will help with the abdominal pain/cramping and diarrhea.  It is dosed 3 times a day as needed.  Keep scheduled follow-up.

## 2025-02-07 ENCOUNTER — PREP FOR SURGERY (OUTPATIENT)
Dept: OTHER | Facility: HOSPITAL | Age: 60
End: 2025-02-07
Payer: COMMERCIAL

## 2025-02-07 ENCOUNTER — TELEPHONE (OUTPATIENT)
Dept: GASTROENTEROLOGY | Facility: CLINIC | Age: 60
End: 2025-02-07
Payer: COMMERCIAL

## 2025-02-07 ENCOUNTER — OFFICE VISIT (OUTPATIENT)
Dept: SURGERY | Facility: CLINIC | Age: 60
End: 2025-02-07
Payer: COMMERCIAL

## 2025-02-07 VITALS
DIASTOLIC BLOOD PRESSURE: 80 MMHG | HEIGHT: 71 IN | WEIGHT: 315 LBS | OXYGEN SATURATION: 95 % | HEART RATE: 79 BPM | BODY MASS INDEX: 44.1 KG/M2 | SYSTOLIC BLOOD PRESSURE: 140 MMHG

## 2025-02-07 DIAGNOSIS — E66.813 CLASS 3 SEVERE OBESITY WITH BODY MASS INDEX (BMI) OF 40.0 TO 44.9 IN ADULT, UNSPECIFIED OBESITY TYPE, UNSPECIFIED WHETHER SERIOUS COMORBIDITY PRESENT: ICD-10-CM

## 2025-02-07 DIAGNOSIS — K38.9 APPENDIX DISEASE: Primary | ICD-10-CM

## 2025-02-07 DIAGNOSIS — K57.30 DIVERTICULOSIS LARGE INTESTINE W/O PERFORATION OR ABSCESS W/O BLEEDING: ICD-10-CM

## 2025-02-07 DIAGNOSIS — K44.9 HIATAL HERNIA WITH GERD: ICD-10-CM

## 2025-02-07 DIAGNOSIS — K21.9 GASTROESOPHAGEAL REFLUX DISEASE WITHOUT ESOPHAGITIS: ICD-10-CM

## 2025-02-07 DIAGNOSIS — E66.01 CLASS 3 SEVERE OBESITY WITH BODY MASS INDEX (BMI) OF 40.0 TO 44.9 IN ADULT, UNSPECIFIED OBESITY TYPE, UNSPECIFIED WHETHER SERIOUS COMORBIDITY PRESENT: ICD-10-CM

## 2025-02-07 DIAGNOSIS — R07.9 CHEST PAIN, UNSPECIFIED TYPE: ICD-10-CM

## 2025-02-07 DIAGNOSIS — K21.9 HIATAL HERNIA WITH GERD: ICD-10-CM

## 2025-02-07 RX ORDER — ALBUTEROL SULFATE 90 UG/1
2 INHALANT RESPIRATORY (INHALATION) EVERY 4 HOURS PRN
COMMUNITY

## 2025-02-07 RX ORDER — DIPHENHYDRAMINE HCL 25 MG
25 CAPSULE ORAL EVERY 6 HOURS PRN
COMMUNITY

## 2025-02-07 NOTE — TELEPHONE ENCOUNTER
----- Message from Naima MEEHAN sent at 2/7/2025  9:07 AM EST -----  Regarding: RESCHEDULE F/U APPT ON 2/10  Mr. Mcnair is having an  EGD/C-scope on Monday with Dr. Palomo Mart, General Surgery. Could someone from your office contact him to reschedule his f/u appt with aKtie on Monday?  Than You  TERELL Mcnamara

## 2025-02-07 NOTE — PROGRESS NOTES
General Surgery History and Physical      Summary:    Maurizio Mcnair is a 59 y.o. gentleman with class III obesity, gastroesophageal reflux disease, intermittent left lower quadrant pain, worsening dysphagia, and finding of significant hiatal hernia, diverticulosis, and prominent appendix with dilated base on recent CT scan.  I recommend colonoscopy and EGD.  I do not think esophageal manometry would be helpful given the size of his hernia.  We discussed he may need appendectomy and paraesophageal hernia repair, however need to investigate for colon polyps and reflux esophagitis first.       History of Present Illness:    Maurizio Mcnair is a 59 y.o.  with long-term gastroesophageal reflux disease on PPI with worsening dysphagia, who presented to the emergency department on Tuesday with left lower quadrant pain after CT scan demonstrated a thickened and inflamed appendix with a prominent base suspicious for appendicitis.  His pain had resolved and he was sent home with close follow-up.  He does report cough at night and worsening dysphagia to fibrous solids.  He denies bloating, weight loss, and blood in his stool.  He has no family history of colorectal cancer.  He states that he has had a colonoscopy at age 52 which was reportedly normal.  He underwent an EGD with  last March that was relatively unremarkable aside from fundic gland polyps likely related to PPI use.  Currently has no abdominal pain nausea or vomiting and is having regular bowel movements.  He has had a urachal cyst excision in the past but no other abdominal surgery.    Past Medical History:   Past Medical History:   Diagnosis Date    Acute torn meniscus     Asthma     Dysphagia     GERD (gastroesophageal reflux disease)     Hernia 1990    Hypertension     Irritable bowel syndrome 2018    Lactose intolerance 2020    Limited mobility     Pneumonia     Right knee pain     Seasonal allergies     Sleep apnea     Weakness            Past Surgical History:    Past Surgical History:   Procedure Laterality Date    ABDOMINAL SURGERY  1990    CATARACT EXTRACTION Bilateral 04/2017    COLONOSCOPY      CYST REMOVAL      MID ABD-AROUND BELLY BUTTON  [facility]    ENDOSCOPY N/A 03/28/2024    Procedure: ESOPHAGOGASTRODUODENOSCOPY with biopsies;  Surgeon: Stevie Gale MD;  Location:  [facility];  Service: Gastroenterology;  Laterality: N/A;  pre: epoigastric pain, dyspepsia  post:    ESOPHAGEAL DILATATION      HERNIA REPAIR Right 06/1988    HERNIA REPAIR      UMBILICAL    KNEE ARTHROSCOPY Right 01/15/2019    Procedure: Knee Arthroscopy with partial medial lateral Menisectomy;  Surgeon: Rodney Vega MD;  Location:  [facility];  Service: Orthopedics    KNEE ARTHROSCOPY Right 12/03/2019    Procedure: Knee Arthroscopy with Menisectomy;  Surgeon: Rodney Vega MD;  Location:  [facility];  Service: Orthopedics    NASAL POLYP EXCISION  10/2016    SEPTOPLASTY    TONSILLECTOMY      TOTAL KNEE ARTHROPLASTY Right 05/06/2021    Procedure: TOTAL KNEE ARTHROPLASTY;  Surgeon: Rodney Vega MD;  Location: [facility] OR;  Service: Orthopedics;  Laterality: Right;    UPPER GASTROINTESTINAL ENDOSCOPY  2020        Family History:    Family History   Problem Relation Age of Onset    Emphysema Father     Inflammatory bowel disease Maternal Aunt         Cousin Neema Dave Hyperthermia Neg Hx         Social History:    Tobacco: Denies  Alcohol: None  Illicit Drugs: None      Medications:   Reviewed in the chart      Laboratory Results  WBC 9.4, hemoglobin 15, platelets 232  Glucose 108, creatinine 1.14, BUN 19, sodium 135, HCO3 25.5  Lipase 17  Lactic acid 0.8    Radiology  I have personally reviewed CT scan of the abdomen pelvis demonstrating a significant hiatal hernia with half the stomach in the chest, a dilated appendiceal base to 12 mm with some fat stranding in the right lower quadrant, and diverticulosis    Endoscopy  I have  personally reviewed EGD report from March 2024 by Dr. Gale demonstrating fundic gland polyps, no reflux esophagitis or hiatal hernia.      Physical Exam:   Vitals:    02/07/25 0807   BP: 140/80   Pulse: 79   SpO2: 95%      General: not sick, awake and alert  Cardiac: normal rate, no JVD, no peripheral edema  Respiratory: nonlabored breathing on room air  Abdomen: soft, nontender, nondistended, no guarding, infraumbilical incision well-healed      Body mass index is 43.96 kg/m².   Class 3 Severe Obesity (BMI >=40). Obesity-related health conditions include the following: obstructive sleep apnea, impaired fasting glucose, and GERD. Obesity is unchanged. BMI is is above average; BMI management plan is completed. We discussed portion control and increasing exercise.        Palomo Mart M.D.  General Surgery  [facility]    [address], [address]  [place], [place], [address]  P: [phone]  F: [phone]

## 2025-02-07 NOTE — H&P (VIEW-ONLY)
General Surgery History and Physical      Summary:    Maurizio Mcnair is a 59 y.o. gentleman with class III obesity, gastroesophageal reflux disease, intermittent left lower quadrant pain, worsening dysphagia, and finding of significant hiatal hernia, diverticulosis, and prominent appendix with dilated base on recent CT scan.  I recommend colonoscopy and EGD.  I do not think esophageal manometry would be helpful given the size of his hernia.  We discussed he may need appendectomy and paraesophageal hernia repair, however need to investigate for colon polyps and reflux esophagitis first.       History of Present Illness:    Maurizio Mcnair is a 59 y.o.  with long-term gastroesophageal reflux disease on PPI with worsening dysphagia, who presented to the emergency department on Tuesday with left lower quadrant pain after CT scan demonstrated a thickened and inflamed appendix with a prominent base suspicious for appendicitis.  His pain had resolved and he was sent home with close follow-up.  He does report cough at night and worsening dysphagia to fibrous solids.  He denies bloating, weight loss, and blood in his stool.  He has no family history of colorectal cancer.  He states that he has had a colonoscopy at age 52 which was reportedly normal.  He underwent an EGD with  last March that was relatively unremarkable aside from fundic gland polyps likely related to PPI use.  Currently has no abdominal pain nausea or vomiting and is having regular bowel movements.  He has had a urachal cyst excision in the past but no other abdominal surgery.    Past Medical History:   Past Medical History:   Diagnosis Date    Acute torn meniscus     Asthma     Dysphagia     GERD (gastroesophageal reflux disease)     Hernia 1990    Hypertension     Irritable bowel syndrome 2018    Lactose intolerance 2020    Limited mobility     Pneumonia     Right knee pain     Seasonal allergies     Sleep apnea     Weakness            Past Surgical History:    Past Surgical History:   Procedure Laterality Date    ABDOMINAL SURGERY  1990    CATARACT EXTRACTION Bilateral 04/2017    COLONOSCOPY      CYST REMOVAL      MID ABD-AROUND BELLY BUTTON  [facility]    ENDOSCOPY N/A 03/28/2024    Procedure: ESOPHAGOGASTRODUODENOSCOPY with biopsies;  Surgeon: Stevie Gale MD;  Location:  [facility];  Service: Gastroenterology;  Laterality: N/A;  pre: epoigastric pain, dyspepsia  post:    ESOPHAGEAL DILATATION      HERNIA REPAIR Right 06/1988    HERNIA REPAIR      UMBILICAL    KNEE ARTHROSCOPY Right 01/15/2019    Procedure: Knee Arthroscopy with partial medial lateral Menisectomy;  Surgeon: Rodney Vega MD;  Location:  [facility];  Service: Orthopedics    KNEE ARTHROSCOPY Right 12/03/2019    Procedure: Knee Arthroscopy with Menisectomy;  Surgeon: Rodney Vega MD;  Location:  [facility];  Service: Orthopedics    NASAL POLYP EXCISION  10/2016    SEPTOPLASTY    TONSILLECTOMY      TOTAL KNEE ARTHROPLASTY Right 05/06/2021    Procedure: TOTAL KNEE ARTHROPLASTY;  Surgeon: Rodney Vega MD;  Location: [facility];  Service: Orthopedics;  Laterality: Right;    UPPER GASTROINTESTINAL ENDOSCOPY  2020        Family History:    Family History   Problem Relation Age of Onset    Emphysema Father     Inflammatory bowel disease Maternal Aunt         Cousin Neema Dave Hyperthermia Neg Hx         Social History:    Tobacco: Denies  Alcohol: None  Illicit Drugs: None      Medications:   Reviewed in the chart      Laboratory Results  WBC 9.4, hemoglobin 15, platelets 232  Glucose 108, creatinine 1.14, BUN 19, sodium 135, HCO3 25.5  Lipase 17  Lactic acid 0.8    Radiology  I have personally reviewed CT scan of the abdomen pelvis demonstrating a significant hiatal hernia with half the stomach in the chest, a dilated appendiceal base to 12 mm with some fat stranding in the right lower quadrant, and diverticulosis    Endoscopy  I have  personally reviewed EGD report from March 2024 by Dr. Gale demonstrating fundic gland polyps, no reflux esophagitis or hiatal hernia.      Physical Exam:   Vitals:    02/07/25 0807   BP: 140/80   Pulse: 79   SpO2: 95%      General: not sick, awake and alert  Cardiac: normal rate, no JVD, no peripheral edema  Respiratory: nonlabored breathing on room air  Abdomen: soft, nontender, nondistended, no guarding, infraumbilical incision well-healed      Body mass index is 43.96 kg/m².   Class 3 Severe Obesity (BMI >=40). Obesity-related health conditions include the following: obstructive sleep apnea, impaired fasting glucose, and GERD. Obesity is unchanged. BMI is is above average; BMI management plan is completed. We discussed portion control and increasing exercise.        Palomo Mart M.D.  General Surgery  [facility]    [address], [address]  [place], [address]  P: [phone]  F: [phone]

## 2025-02-10 ENCOUNTER — ANESTHESIA EVENT (OUTPATIENT)
Dept: GASTROENTEROLOGY | Facility: HOSPITAL | Age: 60
End: 2025-02-10
Payer: COMMERCIAL

## 2025-02-10 ENCOUNTER — HOSPITAL ENCOUNTER (OUTPATIENT)
Facility: HOSPITAL | Age: 60
Setting detail: HOSPITAL OUTPATIENT SURGERY
Discharge: HOME | End: 2025-02-10
Attending: STUDENT IN AN ORGANIZED HEALTH CARE EDUCATION/TRAINING PROGRAM | Admitting: STUDENT IN AN ORGANIZED HEALTH CARE EDUCATION/TRAINING PROGRAM
Payer: COMMERCIAL

## 2025-02-10 ENCOUNTER — ANESTHESIA (OUTPATIENT)
Dept: GASTROENTEROLOGY | Facility: HOSPITAL | Age: 60
End: 2025-02-10
Payer: COMMERCIAL

## 2025-02-10 VITALS
HEART RATE: 73 BPM | OXYGEN SATURATION: 93 % | SYSTOLIC BLOOD PRESSURE: 106 MMHG | DIASTOLIC BLOOD PRESSURE: 75 MMHG | RESPIRATION RATE: 18 BRPM

## 2025-02-10 DIAGNOSIS — K38.9 APPENDIX DISEASE: ICD-10-CM

## 2025-02-10 DIAGNOSIS — K57.30 DIVERTICULOSIS LARGE INTESTINE W/O PERFORATION OR ABSCESS W/O BLEEDING: ICD-10-CM

## 2025-02-10 DIAGNOSIS — K44.9 HIATAL HERNIA WITH GERD: ICD-10-CM

## 2025-02-10 DIAGNOSIS — K21.9 HIATAL HERNIA WITH GERD: ICD-10-CM

## 2025-02-10 DIAGNOSIS — K21.9 GASTROESOPHAGEAL REFLUX DISEASE WITHOUT ESOPHAGITIS: ICD-10-CM

## 2025-02-10 PROBLEM — K31.7 MULTIPLE GASTRIC POLYPS: Status: ACTIVE | Noted: 2025-02-10

## 2025-02-10 PROCEDURE — 45380 COLONOSCOPY AND BIOPSY: CPT | Performed by: STUDENT IN AN ORGANIZED HEALTH CARE EDUCATION/TRAINING PROGRAM

## 2025-02-10 PROCEDURE — 25010000002 LIDOCAINE 2% SOLUTION: Performed by: REGISTERED NURSE

## 2025-02-10 PROCEDURE — 88305 TISSUE EXAM BY PATHOLOGIST: CPT | Performed by: STUDENT IN AN ORGANIZED HEALTH CARE EDUCATION/TRAINING PROGRAM

## 2025-02-10 PROCEDURE — 43239 EGD BIOPSY SINGLE/MULTIPLE: CPT | Performed by: STUDENT IN AN ORGANIZED HEALTH CARE EDUCATION/TRAINING PROGRAM

## 2025-02-10 PROCEDURE — 25810000003 LACTATED RINGERS PER 1000 ML: Performed by: STUDENT IN AN ORGANIZED HEALTH CARE EDUCATION/TRAINING PROGRAM

## 2025-02-10 PROCEDURE — 25010000002 PROPOFOL 10 MG/ML EMULSION: Performed by: REGISTERED NURSE

## 2025-02-10 PROCEDURE — 43251 EGD REMOVE LESION SNARE: CPT | Performed by: STUDENT IN AN ORGANIZED HEALTH CARE EDUCATION/TRAINING PROGRAM

## 2025-02-10 RX ORDER — SODIUM CHLORIDE, SODIUM LACTATE, POTASSIUM CHLORIDE, CALCIUM CHLORIDE 600; 310; 30; 20 MG/100ML; MG/100ML; MG/100ML; MG/100ML
30 INJECTION, SOLUTION INTRAVENOUS CONTINUOUS PRN
Status: DISCONTINUED | OUTPATIENT
Start: 2025-02-10 | End: 2025-02-10 | Stop reason: HOSPADM

## 2025-02-10 RX ORDER — LIDOCAINE HYDROCHLORIDE 20 MG/ML
INJECTION, SOLUTION INFILTRATION; PERINEURAL AS NEEDED
Status: DISCONTINUED | OUTPATIENT
Start: 2025-02-10 | End: 2025-02-10 | Stop reason: SURG

## 2025-02-10 RX ORDER — ONDANSETRON 2 MG/ML
4 INJECTION INTRAMUSCULAR; INTRAVENOUS ONCE AS NEEDED
Status: DISCONTINUED | OUTPATIENT
Start: 2025-02-10 | End: 2025-02-10 | Stop reason: HOSPADM

## 2025-02-10 RX ORDER — PROPOFOL 10 MG/ML
VIAL (ML) INTRAVENOUS AS NEEDED
Status: DISCONTINUED | OUTPATIENT
Start: 2025-02-10 | End: 2025-02-10 | Stop reason: SURG

## 2025-02-10 RX ADMIN — SODIUM CHLORIDE, POTASSIUM CHLORIDE, SODIUM LACTATE AND CALCIUM CHLORIDE 30 ML/HR: 600; 310; 30; 20 INJECTION, SOLUTION INTRAVENOUS at 13:00

## 2025-02-10 RX ADMIN — PROPOFOL 50 MG: 10 INJECTION, EMULSION INTRAVENOUS at 13:39

## 2025-02-10 RX ADMIN — LIDOCAINE HYDROCHLORIDE 100 MG: 20 INJECTION, SOLUTION INFILTRATION; PERINEURAL at 13:38

## 2025-02-10 RX ADMIN — PROPOFOL 180 MCG/KG/MIN: 10 INJECTION, EMULSION INTRAVENOUS at 13:39

## 2025-02-10 RX ADMIN — PROPOFOL 100 MG: 10 INJECTION, EMULSION INTRAVENOUS at 13:38

## 2025-02-10 NOTE — ANESTHESIA PREPROCEDURE EVALUATION
Anesthesia Evaluation     Patient summary reviewed and Nursing notes reviewed                Airway   Mallampati: II  TM distance: >3 FB  Neck ROM: full  No difficulty expected  Dental      Pulmonary    (+) asthma,sleep apnea on CPAP  Cardiovascular     ECG reviewed  Rhythm: regular  Rate: normal    (+) hypertension      Neuro/Psych  (+) weakness  GI/Hepatic/Renal/Endo    (+) morbid obesity, hiatal hernia, GERD    Musculoskeletal     Abdominal    Substance History   (+) alcohol use     OB/GYN negative ob/gyn ROS         Other   arthritis,                   Anesthesia Plan    ASA 3     MAC     (I have reviewed the patient's history with the patient. I have explained the risks of anesthesia including but not limited to dental damage, corneal abrasion, nerve injury, aspiration, MI, stroke, and death. Questions asked and answered. Anesthetic plan discussed with patient and team as indicated. Patient expressed understanding of the above.    Palm mask for oxygenation    )  intravenous induction     Anesthetic plan, risks, benefits, and alternatives have been provided, discussed and informed consent has been obtained with: patient.    Plan discussed with CRNA.    CODE STATUS:

## 2025-02-10 NOTE — DISCHARGE INSTRUCTIONS
For the next 24 hours patient needs to be with a responsible adult.    For 24 hours DO NOT drive, operate machinery, appliances, drink alcohol, make important decisions or sign legal documents.    Start with a light or bland diet if you are feeling sick to your stomach otherwise advance to regular diet as tolerated.    Follow recommendations on procedure report if provided by your doctor.    Call Dr Mart for problems [phone]    Problems may include but not limited to: large amounts of bleeding, trouble breathing, repeated vomiting, severe unrelieved pain, fever or chills.

## 2025-02-10 NOTE — ANESTHESIA POSTPROCEDURE EVALUATION
Patient: Maurizio Mcnair    Procedure Summary       Date: 02/10/25 Room / Location:  [facility]    Anesthesia Start: 1334 Anesthesia Stop: 1437    Procedures:       COLONOSCOPY to cecum with cold biopsy      ESOPHAGOGASTRODUODENOSCOPY with biopsies, hot snare polypectomies (Esophagus) Diagnosis:       Gastroesophageal reflux disease without esophagitis      Hiatal hernia with GERD      Appendix disease      Diverticulosis large intestine w/o perforation or abscess w/o bleeding      (Gastroesophageal reflux disease without esophagitis [K21.9])      (Hiatal hernia with GERD [K44.9, K21.9])      (Appendix disease [K38.9])      (Diverticulosis large intestine w/o perforation or abscess w/o bleeding [K57.30])    Surgeons: Palomo Mart MD Provider: Luis A Adrian MD    Anesthesia Type: MAC ASA Status: 3            Anesthesia Type: MAC    Vitals  Vitals Value Taken Time   /80 02/10/25 1445   Temp     Pulse 79 02/10/25 1445   Resp 16 02/10/25 1445   SpO2 93 % 02/10/25 1445           Post Anesthesia Care and Evaluation    Patient location during evaluation: PACU  Patient participation: complete - patient participated  Level of consciousness: awake and alert  Pain management: adequate    Airway patency: patent  Anesthetic complications: No anesthetic complications    Cardiovascular status: acceptable  Respiratory status: acceptable  Hydration status: acceptable    Comments: --------------------            02/10/25               1445     --------------------   BP:       129/80     Pulse:      79       Resp:       16       SpO2:      93%      --------------------

## 2025-02-10 NOTE — OP NOTE
EGD and Colonoscopy Procedure Note  Maurizio Mcnair  1965  Date of Procedure: 02/10/25    Pre-operative Diagnosis:    Hiatal hernia  GERD  Abnormal appearing appendix  Diverticulosis    Post-operative Diagnosis:  Moderate-Large hiatal hernia   Multiple gastric polyps  Prominent appendiceal orifice  Sigmoid diverticulosis    Procedure: Esophagogastroduodenoscopy with biopsy and hot snare polypectomy, Colonoscopy with biopsy     Findings/Treatments:   Moderate-Large hiatal hernia without esophagitis   Two 10mm pedunculated gastric polyps removed with hot snare polypectomy  5mm antral sessile polyp biopsied with cold biopsy forceps  Prominent appendiceal orifice biopsied with cold biopsy forceps  Sigmoid diverticulosis       Recommendations:   Colonoscopy interval pending pathology    Surgeon: Palomo Mart MD    Anesthetic: MAC per Luis A Adrian MD      Procedure Details:    MAC anesthesia was induced.   The endoscope was introduced through the oropharynx and advanced down the esophagus into the stomach. The pylorus was visualized and traversed. Duodenal bulb mucosa appeared normal. The scope was withdrawn into the stomach. The gastric mucosa appeared normal aside from multiple nonulcerated gastric polyps in the gastric body.  A 5 mm sessile polyp in the gastric antrum along the lesser curvature was biopsied with cold biopsy forceps.  2 different pedunculated 10 mm polyps in the gastric body were removed with hot snare polypectomy and removed intact. Hemostasis was observed There was a large hiatal hernia seen on retroflexion. The GE junction was visualized at 35 cm from the incisors. The diaphragmatic impression was visualized at 41 cm from the incisors. There was no esophagitis.       The patient was positioned in left lateral decubitus. A digital rectal exam was performed along with visual inspection of the anus. The colonoscope was inserted into the rectum and advanced to the cecum, with relative  ease.  The cecum was identified by the appendiceal orifice and the ileocecal valve and photographed for documentation.    A careful inspection was made as the scope was withdrawn, including a retroflexed view of the rectum.  The ileocecal valve was intubated and terminal ileum mucosa appeared normal.  The appendiceal orifice was prominent and partially inverted.  The cold forceps biopsy of the appendiceal orifice was obtained.  There were no additional lesions.  There was mild small mouth diverticula in the sigmoid colon.  Retroflexion in the rectum revealed normal anorectal mucosa. Prep quality was good.      Palomo Mart M.D.  General Surgery  [facility]    [address], [address]  [place], [place], [address]  P: [phone]  F: [phone]

## 2025-02-11 ENCOUNTER — TELEPHONE (OUTPATIENT)
Dept: SURGERY | Facility: CLINIC | Age: 60
End: 2025-02-11
Payer: COMMERCIAL

## 2025-02-11 LAB
CYTO UR: NORMAL
LAB AP CASE REPORT: NORMAL
PATH REPORT.FINAL DX SPEC: NORMAL
PATH REPORT.GROSS SPEC: NORMAL

## 2025-02-11 NOTE — TELEPHONE ENCOUNTER
I talked to the patient on the phone regarding his colonoscopy and EGD pathology report indicating fundic gland polyps and essentially inflammation of the appendiceal orifice.  I recommend appendectomy at the time of hiatal hernia repair and interval colonoscopy in 10 years.  I will have him make an office appointment with me to discuss surgery.

## 2025-02-17 ENCOUNTER — PREP FOR SURGERY (OUTPATIENT)
Dept: OTHER | Facility: HOSPITAL | Age: 60
End: 2025-02-17
Payer: COMMERCIAL

## 2025-02-17 ENCOUNTER — OFFICE VISIT (OUTPATIENT)
Dept: SURGERY | Facility: CLINIC | Age: 60
End: 2025-02-17
Payer: COMMERCIAL

## 2025-02-17 VITALS
HEIGHT: 71 IN | DIASTOLIC BLOOD PRESSURE: 80 MMHG | WEIGHT: 314 LBS | OXYGEN SATURATION: 93 % | SYSTOLIC BLOOD PRESSURE: 130 MMHG | HEART RATE: 84 BPM | BODY MASS INDEX: 43.96 KG/M2

## 2025-02-17 DIAGNOSIS — K44.9 HIATAL HERNIA WITH GERD: Primary | ICD-10-CM

## 2025-02-17 DIAGNOSIS — K21.9 HIATAL HERNIA WITH GERD: Primary | ICD-10-CM

## 2025-02-17 DIAGNOSIS — K38.9 APPENDIX DISEASE: ICD-10-CM

## 2025-02-17 PROCEDURE — 99215 OFFICE O/P EST HI 40 MIN: CPT | Performed by: STUDENT IN AN ORGANIZED HEALTH CARE EDUCATION/TRAINING PROGRAM

## 2025-02-17 RX ORDER — SODIUM CHLORIDE 9 MG/ML
40 INJECTION, SOLUTION INTRAVENOUS AS NEEDED
OUTPATIENT
Start: 2025-02-17

## 2025-02-17 RX ORDER — SODIUM CHLORIDE 0.9 % (FLUSH) 0.9 %
10 SYRINGE (ML) INJECTION AS NEEDED
OUTPATIENT
Start: 2025-02-17

## 2025-02-17 RX ORDER — SODIUM CHLORIDE 0.9 % (FLUSH) 0.9 %
3 SYRINGE (ML) INJECTION EVERY 12 HOURS SCHEDULED
OUTPATIENT
Start: 2025-02-17

## 2025-02-17 NOTE — PROGRESS NOTES
GENERAL SURGERY OFFICE NOTE      HISTORY OF PRESENT ILLNESS:  This is a 59 y.o. gentleman with gastroesophageal reflux disease and large hiatal hernia, as well as prominent appendix with dilated base on CT scan.  He did not have any evidence of Lucian ulcer or peptic stricture or significant reflux esophagitis on EGD, however did have large fundic gland polyps.  Colonoscopy showed a prominent appendiceal orifice, biopsy essentially showed acute inflammation without evidence of malignancy, as well as diverticulosis but no polyps.  He has no abdominal pain. His reflux is moderately well-controlled with PPI, however If he misses a dose he reports severe reflux symptoms.  His main complaint is food intermittently getting stuck and some difficulty with swallowing.  He has had an episode a couple months ago of severe retrosternal discomfort and pain with swallowing, however this has resolved.  His only abdominal surgery in the past was what sounds like a urachal cyst excision.     Pathology:   1.  Stomach, antrum, biopsy: Benign gastric mucosa with               -A. Mild chronic inflammation (mild non-specific chronic gastritis).                -B. No intestinal metaplasia.               -C. No Helicobacter pylori.      2.  Stomach, body, biopsy: Fundic gland polyps with mild chronic inflammation     3.  Appendiceal orifice, biopsy: Benign colonic mucosa with               -A.  Cryptitis and crypt abscess formation, focal               -B.  No distortion of the crypt architecture and no granuloma               -C.  No dysplasia    PHYSICAL EXAM:  General: not sick, awake and alert  Cardiac: normal rate, no JVD, no peripheral edema  Respiratory: nonlabored breathing on room air  Abdomen: soft, protuberant, nontender, nondistended, no guarding periumbilical scar      Body mass index is 43.96 kg/m².      DATA REVIEWED:  Vitals:    02/17/25 0915   BP: 130/80   Pulse: 84   SpO2: 93%         Plan:  We discussed my  recommendation for appendectomy due to the prominent dilated appendiceal base measuring 12 mm on CT scan, despite biopsy being negative for malignancy.  We also discussed laparoscopic hiatal hernia repair with partial fundoplication, and that these two procedures could potentially be done simultaneously.  However, he is at significantly elevated risk of hernia recurrence and other complications from surgery due to his class III obesity and BMI of 44.  Given his reflux is decently well-controlled with medical management and his dysphagia is relatively mild, I recommend deferring elective paraesophageal hernia repair until BMI is closer to 35 unless he develops volvulus or obstruction. Because he is not having any abdominal pain he is reluctant to proceed with appendectomy alone in the meantime.  I asked him to give me a call when he has been able to get his weight down to 275 pounds and we will discuss simultaneous appendectomy and hiatal hernia repair at that time.         Palomo Mart MD  General Surgery  [facility]    [address], [address]  [place] [address]  P: [phone]  F: [phone]

## 2025-03-03 ENCOUNTER — TELEPHONE (OUTPATIENT)
Dept: SURGERY | Facility: CLINIC | Age: 60
End: 2025-03-03
Payer: COMMERCIAL

## 2025-03-07 ENCOUNTER — OFFICE VISIT (OUTPATIENT)
Dept: SURGERY | Facility: CLINIC | Age: 60
End: 2025-03-07
Payer: COMMERCIAL

## 2025-03-07 VITALS
BODY MASS INDEX: 42.42 KG/M2 | DIASTOLIC BLOOD PRESSURE: 82 MMHG | HEIGHT: 71 IN | SYSTOLIC BLOOD PRESSURE: 140 MMHG | WEIGHT: 303 LBS | OXYGEN SATURATION: 95 % | HEART RATE: 64 BPM

## 2025-03-07 DIAGNOSIS — K44.9 HIATAL HERNIA WITH GERD: ICD-10-CM

## 2025-03-07 DIAGNOSIS — K57.30 DIVERTICULOSIS LARGE INTESTINE W/O PERFORATION OR ABSCESS W/O BLEEDING: ICD-10-CM

## 2025-03-07 DIAGNOSIS — E66.01 CLASS 3 SEVERE OBESITY WITH BODY MASS INDEX (BMI) OF 40.0 TO 44.9 IN ADULT, UNSPECIFIED OBESITY TYPE, UNSPECIFIED WHETHER SERIOUS COMORBIDITY PRESENT: Primary | ICD-10-CM

## 2025-03-07 DIAGNOSIS — K21.9 HIATAL HERNIA WITH GERD: ICD-10-CM

## 2025-03-07 DIAGNOSIS — E66.813 CLASS 3 SEVERE OBESITY WITH BODY MASS INDEX (BMI) OF 40.0 TO 44.9 IN ADULT, UNSPECIFIED OBESITY TYPE, UNSPECIFIED WHETHER SERIOUS COMORBIDITY PRESENT: Primary | ICD-10-CM

## 2025-03-07 DIAGNOSIS — K38.9 APPENDIX DISEASE: ICD-10-CM

## 2025-03-07 DIAGNOSIS — K21.9 GASTROESOPHAGEAL REFLUX DISEASE WITHOUT ESOPHAGITIS: ICD-10-CM

## 2025-03-07 RX ORDER — ONDANSETRON 4 MG/1
4 TABLET, FILM COATED ORAL DAILY PRN
Qty: 30 TABLET | Refills: 1 | Status: SHIPPED | OUTPATIENT
Start: 2025-03-07 | End: 2026-03-07

## 2025-03-07 RX ORDER — PANTOPRAZOLE SODIUM 40 MG/1
40 TABLET, DELAYED RELEASE ORAL
Qty: 180 TABLET | Refills: 3 | Status: SHIPPED | OUTPATIENT
Start: 2025-03-07 | End: 2026-03-07

## 2025-03-07 NOTE — PROGRESS NOTES
GENERAL SURGERY OFFICE NOTE      HISTORY OF PRESENT ILLNESS:  This is a 59 y.o. gentleman with gastroesophageal reflux disease and medium to large sized hiatal hernia as well as prominent appendix with a dilated base on CT scan.  Colonoscopy demonstrated diverticulosis and appendiceal orifice was prominent however biopsy was normal.  EGD showed large fundic gland polyps however no Lucian ulcer or stricture or evidence of significant reflux esophagitis. He has been having vomiting at night after going to bed despite not eating within 6 hours of bedtime and taking his omeprazole daily.  He feels as though his reflux symptoms are getting worse and wants to discuss surgery to repair his hiatal hernia.    Pathology:   Final Diagnosis   1.  Stomach, antrum, biopsy: Benign gastric mucosa with               -A. Mild chronic inflammation (mild non-specific chronic gastritis).                -B. No intestinal metaplasia.               -C. No Helicobacter pylori.      2.  Stomach, body, biopsy: Fundic gland polyps with mild chronic inflammation     3.  Appendiceal orifice, biopsy: Benign colonic mucosa with               -A.  Cryptitis and crypt abscess formation, focal               -B.  No distortion of the crypt architecture and no granuloma               -C.  No dysplasia     Comment: There is an acute colitis with nonspecific findings.  Recommend correlation with microbiologic studies.       PHYSICAL EXAM:  Abdomen: soft, nontender, nondistended, no guarding      Body mass index is 42.42 kg/m².      DATA REVIEWED:  Vitals:    03/07/25 0812   BP: 140/82   Pulse: 64   SpO2: 95%     CT scan abdomen pelvis  IMPRESSION:     1. The base of the appendix is dilated up to approximately 12 mm in  diameter. Mild patchy appendiceal fat stranding in the right lower  quadrant. Findings suggest acute uncomplicated appendicitis. This was  discussed over the phone with the covering clinician, Brenda Perry,  at 3:57 p.m. on 1/30/2025.  The patient will be sent to the ER for  evaluation.   2. Urinary bladder wall thickening could be accentuated by under  distention. Recommend correlating with urinalysis.  3. Moderate to large hiatal hernia with gastric wall thickening that  could be accentuated by underdistention but could reflect a nonspecific  gastritis.  4. Colonic diverticula, without CT evidence of diverticulitis.         A/P:  We discussed that due to his BMI of 43 he is at high risk for hiatal hernia recurrence and complications during paraesophageal hernia repair with partial fundoplication.  We discussed recurrent hiatal hernia repair is more difficult and associated with a higher risk of complications such as esophageal or nerve injury.  We also talked about my recommendation for considering bariatric surgery given his reflux and class III morbid obesity, however he is not interested.  I do not think it is in his best interest to proceed with paraesophageal hernia repair until he loses weight unless there is an emergent indication for surgery such as incarcerated hernia with gastric volvulus and stomach ischemia.  I also recommend concomitant appendectomy. We set a target of 250 pounds which would make his BMI 35.  Today he is 303 pounds, previously 314.  I encouraged continued fiber supplementation for diverticulosis, diet and exercise program, and sent him prescriptions for Zofran as well as BID omeprazole, maximal medical management of GERD.  Will follow-up in 3 months to see how his symptoms and weight loss are going.      Palomo Mart MD  General Surgery  [facility]    [address], [address]  [place] [id number]  P: [phone]  F: [phone]

## 2025-07-28 ENCOUNTER — TELEPHONE (OUTPATIENT)
Dept: GASTROENTEROLOGY | Facility: CLINIC | Age: 60
End: 2025-07-28
Payer: COMMERCIAL

## 2025-08-04 NOTE — TELEPHONE ENCOUNTER
Called and spoke to patient. Informed patient that FMLA was faxed. Made appointment for patient to come in.

## (undated) DEVICE — ADHS SKIN SURG TISS VISC PREMIERPRO EXOFIN HI/VISC FAST/DRY

## (undated) DEVICE — DRSNG SURESITE WNDW 4X4.5

## (undated) DEVICE — PK ARTHSCP 40

## (undated) DEVICE — APPL CHLORAPREP HI/LITE 26ML ORNG

## (undated) DEVICE — GLV SURG SENSICARE W/ALOE PF LF 8.5 STRL

## (undated) DEVICE — TUBING SET, GRAVITY, 4-SPIKE

## (undated) DEVICE — TRAP FLD MINIVAC MEGADYNE 100ML

## (undated) DEVICE — PREP SOL POVIDONE/IODINE BT 4OZ

## (undated) DEVICE — OPTIFOAM GENTLE SA, POSTOP, 4X12: Brand: MEDLINE

## (undated) DEVICE — SKIN PREP TRAY W/CHG: Brand: MEDLINE INDUSTRIES, INC.

## (undated) DEVICE — SOL ISO/ALC RUB 70PCT 4OZ

## (undated) DEVICE — CANN O2 ETCO2 FITS ALL CONN CO2 SMPL A/ 7IN DISP LF

## (undated) DEVICE — SUT VIC 2/0 CT2 27IN J269H

## (undated) DEVICE — UNDERCAST PADDING: Brand: DEROYAL

## (undated) DEVICE — TUBING, SUCTION, 1/4" X 10', STRAIGHT: Brand: MEDLINE

## (undated) DEVICE — GLV SURG TRIUMPH CLASSIC PF LTX 8.5 STRL

## (undated) DEVICE — BLD SHVE RESEC COOLCT SABRE CRV 4MM 13CM

## (undated) DEVICE — LN SMPL CO2 SHTRM SD STREAM W/M LUER

## (undated) DEVICE — GLV SURG BIOGEL LTX PF 8 1/2

## (undated) DEVICE — GLV SURG SIGNATURE ESSENTIAL PF LTX SZ8.5

## (undated) DEVICE — SINGLE-USE BIOPSY FORCEPS: Brand: RADIAL JAW 4

## (undated) DEVICE — FRCP BX RADJAW4 NDL 2.8 240CM LG OG BX40

## (undated) DEVICE — SUT ETHLN 4/0 PS2 PLSTC 1667G

## (undated) DEVICE — THE SINGLE USE ETRAP – POLYP TRAP IS USED FOR SUCTION RETRIEVAL OF ENDOSCOPICALLY REMOVED POLYPS.: Brand: ETRAP

## (undated) DEVICE — DRSNG WND GZ CURAD OIL EMULSION 3X3IN STRL

## (undated) DEVICE — KT ORCA ORCAPOD DISP STRL

## (undated) DEVICE — ABL ASP APOLLO RF XL 90D

## (undated) DEVICE — PK KN TOTL 40

## (undated) DEVICE — SENSR O2 OXIMAX FNGR A/ 18IN NONSTR

## (undated) DEVICE — APPL CHLORAPREP W/TINT 26ML ORNG

## (undated) DEVICE — GLV SURG PREMIERPRO ORTHO LTX PF SZ8 BRN

## (undated) DEVICE — DUAL CUT SAGITTAL BLADE

## (undated) DEVICE — SOL NACL 0.9PCT 100ML SGL

## (undated) DEVICE — BNDG ESMARK STRL 6INX12FT LF

## (undated) DEVICE — PENCL E/S ULTRAVAC TELESCP NOSE HOLSTR 10FT

## (undated) DEVICE — SNAR POLYP CAPTIVATOR2 RND STFF 2.4 25MM 240CM

## (undated) DEVICE — TUBING, SUCTION, 1/4" X 20', STRAIGHT: Brand: MEDLINE INDUSTRIES, INC.

## (undated) DEVICE — DISPOSABLE TOURNIQUET CUFF SINGLE BLADDER, SINGLE PORT AND QUICK CONNECT CONNECTOR: Brand: COLOR CUFF

## (undated) DEVICE — ADAPT CLN BIOGUARD AIR/H2O DISP

## (undated) DEVICE — BLCK/BITE BLOX W/DENTL/RIM W/STRAP 54F

## (undated) DEVICE — BNDG ELAS ELITE V/CLOSE 6IN 5YD LF STRL

## (undated) DEVICE — ANTIBACTERIAL UNDYED BRAIDED (POLYGLACTIN 910), SYNTHETIC ABSORBABLE SUTURE: Brand: COATED VICRYL

## (undated) DEVICE — PAD,ABDOMINAL,8"X10",ST,LF: Brand: MEDLINE

## (undated) DEVICE — DRSNG TELFA PAD NONADH STR 1S 3X8IN

## (undated) DEVICE — MSK PROC CURAPLEX O2 2/ADAPT 7FT

## (undated) DEVICE — GLV SURG BIOGEL LTX PF 8

## (undated) DEVICE — PATIENT RETURN ELECTRODE, SINGLE-USE, CONTACT QUALITY MONITORING, ADULT, WITH 9FT CORD, FOR PATIENTS WEIGING OVER 33LBS. (15KG): Brand: MEGADYNE